# Patient Record
Sex: FEMALE | Race: WHITE | NOT HISPANIC OR LATINO | Employment: OTHER | URBAN - METROPOLITAN AREA
[De-identification: names, ages, dates, MRNs, and addresses within clinical notes are randomized per-mention and may not be internally consistent; named-entity substitution may affect disease eponyms.]

---

## 2018-02-05 ENCOUNTER — OFFICE VISIT CONVERTED (OUTPATIENT)
Dept: CARDIOLOGY | Facility: CLINIC | Age: 73
End: 2018-02-05
Attending: SPECIALIST

## 2018-02-05 ENCOUNTER — CONVERSION ENCOUNTER (OUTPATIENT)
Dept: CARDIOLOGY | Facility: CLINIC | Age: 73
End: 2018-02-05

## 2018-05-07 ENCOUNTER — HOSPITAL ENCOUNTER (OUTPATIENT)
Dept: GENERAL RADIOLOGY | Facility: HOSPITAL | Age: 73
Discharge: HOME | End: 2018-05-07
Admitting: FAMILY MEDICINE

## 2018-05-07 ENCOUNTER — TRANSCRIBE ORDERS (OUTPATIENT)
Dept: ULTRASOUND IMAGING | Facility: HOSPITAL | Age: 73
End: 2018-05-07

## 2018-05-07 DIAGNOSIS — M25.561 RIGHT KNEE PAIN, UNSPECIFIED CHRONICITY: Primary | ICD-10-CM

## 2018-05-07 PROCEDURE — 73562 X-RAY EXAM OF KNEE 3: CPT

## 2018-06-01 ENCOUNTER — HOSPITAL ENCOUNTER (OUTPATIENT)
Dept: GENERAL RADIOLOGY | Facility: HOSPITAL | Age: 73
Discharge: HOME | End: 2018-06-01
Admitting: NURSE PRACTITIONER

## 2018-06-01 ENCOUNTER — HOSPITAL ENCOUNTER (OUTPATIENT)
Dept: GENERAL RADIOLOGY | Facility: HOSPITAL | Age: 73
Discharge: HOME | End: 2018-06-01

## 2018-06-01 ENCOUNTER — TRANSCRIBE ORDERS (OUTPATIENT)
Dept: ULTRASOUND IMAGING | Facility: HOSPITAL | Age: 73
End: 2018-06-01

## 2018-06-01 DIAGNOSIS — M25.572 LEFT ANKLE PAIN, UNSPECIFIED CHRONICITY: Primary | ICD-10-CM

## 2018-06-01 DIAGNOSIS — M79.672 LEFT FOOT PAIN: ICD-10-CM

## 2018-06-01 PROCEDURE — 73610 X-RAY EXAM OF ANKLE: CPT

## 2018-06-01 PROCEDURE — 73630 X-RAY EXAM OF FOOT: CPT

## 2018-11-20 ENCOUNTER — TRANSCRIBE ORDERS (OUTPATIENT)
Dept: ULTRASOUND IMAGING | Facility: HOSPITAL | Age: 73
End: 2018-11-20

## 2018-11-20 ENCOUNTER — HOSPITAL ENCOUNTER (OUTPATIENT)
Dept: GENERAL RADIOLOGY | Facility: HOSPITAL | Age: 73
Discharge: HOME | End: 2018-11-20
Admitting: NURSE PRACTITIONER

## 2018-11-20 DIAGNOSIS — M25.571 RIGHT ANKLE PAIN, UNSPECIFIED CHRONICITY: Primary | ICD-10-CM

## 2018-11-20 PROCEDURE — 73610 X-RAY EXAM OF ANKLE: CPT

## 2019-02-22 DIAGNOSIS — I48.91 NEW ONSET A-FIB (HCC): ICD-10-CM

## 2019-02-22 RX ORDER — ESCITALOPRAM OXALATE 20 MG/1
20 TABLET ORAL DAILY
Status: ON HOLD | COMMUNITY
End: 2021-05-11 | Stop reason: SDUPTHER

## 2019-02-22 RX ORDER — OXYBUTYNIN CHLORIDE 10 MG/1
10 TABLET, EXTENDED RELEASE ORAL DAILY
COMMUNITY
End: 2019-07-03 | Stop reason: DRUGHIGH

## 2019-02-27 ENCOUNTER — OFFICE VISIT (OUTPATIENT)
Dept: CARDIOLOGY | Age: 74
End: 2019-02-27
Payer: MEDICARE

## 2019-02-27 VITALS
WEIGHT: 141 LBS | HEART RATE: 58 BPM | DIASTOLIC BLOOD PRESSURE: 78 MMHG | HEIGHT: 60 IN | SYSTOLIC BLOOD PRESSURE: 132 MMHG | BODY MASS INDEX: 27.68 KG/M2

## 2019-02-27 DIAGNOSIS — I10 ESSENTIAL HYPERTENSION: ICD-10-CM

## 2019-02-27 DIAGNOSIS — R00.2 PALPITATIONS: Primary | ICD-10-CM

## 2019-02-27 PROCEDURE — 4040F PNEUMOC VAC/ADMIN/RCVD: CPT | Performed by: CLINICAL NURSE SPECIALIST

## 2019-02-27 PROCEDURE — 1036F TOBACCO NON-USER: CPT | Performed by: CLINICAL NURSE SPECIALIST

## 2019-02-27 PROCEDURE — 93000 ELECTROCARDIOGRAM COMPLETE: CPT | Performed by: CLINICAL NURSE SPECIALIST

## 2019-02-27 PROCEDURE — G8484 FLU IMMUNIZE NO ADMIN: HCPCS | Performed by: CLINICAL NURSE SPECIALIST

## 2019-02-27 PROCEDURE — 99204 OFFICE O/P NEW MOD 45 MIN: CPT | Performed by: CLINICAL NURSE SPECIALIST

## 2019-02-27 PROCEDURE — 1090F PRES/ABSN URINE INCON ASSESS: CPT | Performed by: CLINICAL NURSE SPECIALIST

## 2019-02-27 PROCEDURE — G8427 DOCREV CUR MEDS BY ELIG CLIN: HCPCS | Performed by: CLINICAL NURSE SPECIALIST

## 2019-02-27 PROCEDURE — 3017F COLORECTAL CA SCREEN DOC REV: CPT | Performed by: CLINICAL NURSE SPECIALIST

## 2019-02-27 PROCEDURE — 1123F ACP DISCUSS/DSCN MKR DOCD: CPT | Performed by: CLINICAL NURSE SPECIALIST

## 2019-02-27 PROCEDURE — G8400 PT W/DXA NO RESULTS DOC: HCPCS | Performed by: CLINICAL NURSE SPECIALIST

## 2019-02-27 PROCEDURE — 0296T PR EXT ECG > 48HR TO 21 DAY RCRD W/CONECT INTL RCRD: CPT | Performed by: CLINICAL NURSE SPECIALIST

## 2019-02-27 PROCEDURE — 1101F PT FALLS ASSESS-DOCD LE1/YR: CPT | Performed by: CLINICAL NURSE SPECIALIST

## 2019-02-27 PROCEDURE — G8419 CALC BMI OUT NRM PARAM NOF/U: HCPCS | Performed by: CLINICAL NURSE SPECIALIST

## 2019-02-27 RX ORDER — LOSARTAN POTASSIUM AND HYDROCHLOROTHIAZIDE 12.5; 5 MG/1; MG/1
1 TABLET ORAL PRN
COMMUNITY
End: 2019-02-27 | Stop reason: ALTCHOICE

## 2019-02-27 RX ORDER — OLMESARTAN MEDOXOMIL AND HYDROCHLOROTHIAZIDE 20/12.5 20; 12.5 MG/1; MG/1
1 TABLET ORAL DAILY
Qty: 30 TABLET | Refills: 5 | Status: SHIPPED | OUTPATIENT
Start: 2019-02-27 | End: 2019-07-03 | Stop reason: DRUGHIGH

## 2019-02-27 ASSESSMENT — ENCOUNTER SYMPTOMS
EYE REDNESS: 0
FACIAL SWELLING: 0
NAUSEA: 0
CHEST TIGHTNESS: 0
COUGH: 0
VOMITING: 0
SHORTNESS OF BREATH: 0
ABDOMINAL PAIN: 0
WHEEZING: 0

## 2019-03-11 ENCOUNTER — TELEPHONE (OUTPATIENT)
Dept: CARDIOLOGY | Age: 74
End: 2019-03-11

## 2019-03-27 ENCOUNTER — OFFICE VISIT (OUTPATIENT)
Dept: CARDIOLOGY | Age: 74
End: 2019-03-27
Payer: MEDICARE

## 2019-03-27 VITALS
DIASTOLIC BLOOD PRESSURE: 80 MMHG | BODY MASS INDEX: 29.25 KG/M2 | HEIGHT: 60 IN | WEIGHT: 149 LBS | HEART RATE: 70 BPM | SYSTOLIC BLOOD PRESSURE: 166 MMHG

## 2019-03-27 DIAGNOSIS — I24.9 ACUTE ISCHEMIC HEART DISEASE (HCC): ICD-10-CM

## 2019-03-27 DIAGNOSIS — I25.9 CHEST PAIN DUE TO MYOCARDIAL ISCHEMIA, UNSPECIFIED ISCHEMIC CHEST PAIN TYPE: Primary | ICD-10-CM

## 2019-03-27 PROCEDURE — 99213 OFFICE O/P EST LOW 20 MIN: CPT | Performed by: INTERNAL MEDICINE

## 2019-03-27 RX ORDER — METOPROLOL SUCCINATE 50 MG/1
50 TABLET, EXTENDED RELEASE ORAL DAILY
Qty: 90 TABLET | Refills: 3 | Status: SHIPPED | OUTPATIENT
Start: 2019-03-27 | End: 2019-07-03 | Stop reason: DRUGHIGH

## 2019-04-16 DIAGNOSIS — R07.9 CHEST PAIN IN ADULT: Primary | ICD-10-CM

## 2019-04-16 NOTE — PROGRESS NOTES
[facility] called and said diagnosis Dr. Patrick Hendrickson placed does not cover for ECHO scheduled tomorrow. He used unspecified chest pain. Changed and put chest pain in adult.

## 2019-04-17 ENCOUNTER — HOSPITAL ENCOUNTER (OUTPATIENT)
Dept: NON INVASIVE DIAGNOSTICS | Age: 74
Discharge: HOME | End: 2019-04-17
Payer: MEDICARE

## 2019-04-17 DIAGNOSIS — R07.9 CHEST PAIN IN ADULT: ICD-10-CM

## 2019-04-17 LAB
LV EF: 67 %
LVEF MODALITY: NORMAL

## 2019-04-17 PROCEDURE — 93306 TTE W/DOPPLER COMPLETE: CPT

## 2019-04-29 ENCOUNTER — HOSPITAL ENCOUNTER (OUTPATIENT)
Dept: NUCLEAR MEDICINE | Age: 74
Discharge: HOME | End: 2019-05-01
Payer: MEDICARE

## 2019-04-29 ENCOUNTER — HOSPITAL ENCOUNTER (OUTPATIENT)
Dept: NON INVASIVE DIAGNOSTICS | Age: 74
Discharge: HOME | End: 2019-04-29
Payer: MEDICARE

## 2019-04-29 DIAGNOSIS — I24.9 ACUTE ISCHEMIC HEART DISEASE (HCC): ICD-10-CM

## 2019-04-29 DIAGNOSIS — I25.9 CHEST PAIN DUE TO MYOCARDIAL ISCHEMIA, UNSPECIFIED ISCHEMIC CHEST PAIN TYPE: ICD-10-CM

## 2019-04-29 PROCEDURE — 3430000000 HC RX DIAGNOSTIC RADIOPHARMACEUTICAL: Performed by: INTERNAL MEDICINE

## 2019-04-29 PROCEDURE — 93017 CV STRESS TEST TRACING ONLY: CPT

## 2019-04-29 PROCEDURE — A9500 TC99M SESTAMIBI: HCPCS | Performed by: INTERNAL MEDICINE

## 2019-04-29 PROCEDURE — 78452 HT MUSCLE IMAGE SPECT MULT: CPT

## 2019-04-29 RX ADMIN — TETRAKIS(2-METHOXYISOBUTYLISOCYANIDE)COPPER(I) TETRAFLUOROBORATE 10 MILLICURIE: 1 INJECTION, POWDER, LYOPHILIZED, FOR SOLUTION INTRAVENOUS at 15:56

## 2019-04-29 RX ADMIN — TETRAKIS(2-METHOXYISOBUTYLISOCYANIDE)COPPER(I) TETRAFLUOROBORATE 30 MILLICURIE: 1 INJECTION, POWDER, LYOPHILIZED, FOR SOLUTION INTRAVENOUS at 15:57

## 2019-04-30 LAB
LV EF: 76 %
LVEF MODALITY: NORMAL

## 2019-05-31 ENCOUNTER — HOSPITAL ENCOUNTER (OUTPATIENT)
Dept: URGENT CARE | Facility: CLINIC | Age: 74
Discharge: HOME | End: 2019-05-31
Attending: NURSE PRACTITIONER

## 2019-05-31 LAB — GLUCOSE BLD-MCNC: 136 MG/DL (ref 65–99)

## 2019-07-03 ENCOUNTER — OFFICE VISIT (OUTPATIENT)
Dept: CARDIOLOGY | Age: 74
End: 2019-07-03
Payer: MEDICARE

## 2019-07-03 VITALS
HEART RATE: 60 BPM | BODY MASS INDEX: 29.25 KG/M2 | HEIGHT: 60 IN | WEIGHT: 149 LBS | DIASTOLIC BLOOD PRESSURE: 76 MMHG | SYSTOLIC BLOOD PRESSURE: 140 MMHG

## 2019-07-03 DIAGNOSIS — I47.1 SVT (SUPRAVENTRICULAR TACHYCARDIA) (HCC): Primary | ICD-10-CM

## 2019-07-03 PROCEDURE — 99213 OFFICE O/P EST LOW 20 MIN: CPT | Performed by: INTERNAL MEDICINE

## 2019-07-03 RX ORDER — OXYBUTYNIN CHLORIDE 10 MG/1
10 TABLET, EXTENDED RELEASE ORAL PRN
Status: ON HOLD | COMMUNITY
End: 2021-05-11 | Stop reason: HOSPADM

## 2019-07-03 RX ORDER — METOPROLOL SUCCINATE 50 MG/1
50 TABLET, EXTENDED RELEASE ORAL DAILY
COMMUNITY
End: 2020-08-12 | Stop reason: SDUPTHER

## 2019-07-03 RX ORDER — OLMESARTAN MEDOXOMIL AND HYDROCHLOROTHIAZIDE 20/12.5 20; 12.5 MG/1; MG/1
1 TABLET ORAL PRN
COMMUNITY
End: 2021-01-12 | Stop reason: SDUPTHER

## 2019-07-03 NOTE — PROGRESS NOTES
Smokeless tobacco: Never Used   Substance and Sexual Activity    Alcohol use: Yes     Comment: Occasional.    Drug use: No    Sexual activity: Not on file   Lifestyle    Physical activity:     Days per week: Not on file     Minutes per session: Not on file    Stress: Not on file   Relationships    Social connections:     Talks on phone: Not on file     Gets together: Not on file     Attends Hinduism service: Not on file     Active member of club or organization: Not on file     Attends meetings of clubs or organizations: Not on file     Relationship status: Not on file    Intimate partner violence:     Fear of current or ex partner: Not on file     Emotionally abused: Not on file     Physically abused: Not on file     Forced sexual activity: Not on file   Other Topics Concern    Not on file   Social History Narrative    Not on file       Family History:       Problem Relation Age of Onset    Hypertension Father     Stroke Father          age 80    Brain Cancer Mother          Physical Examination:  BP (!) 140/76 (Site: Left Upper Arm)   Pulse 60   Ht 5' (1.524 m)   Wt 149 lb (67.6 kg)   BMI 29.10 kg/m²   Physical Exam  Blood pressure right arm sitting 140/80 mmHg, pulse 60 bpm regular  No JVD  No edema  No carotid bruits  S1 and S2 of normal intensity, no significant systolic or diastolic murmurs appreciated  Lungs are clear bilaterally with no crepitations or wheezes  Abdomen is soft, nontender, no palpable organomegaly, no abnormal pulsations are felt, no bruits audible  Neurological status is intact  No deformities    Labs:   CBC: No results for input(s): WBC, HGB, HCT, PLT in the last 72 hours. BMP:No results for input(s): NA, K, CO2, BUN, CREATININE, LABGLOM, GLUCOSE in the last 72 hours. BNP: No results for input(s): BNP in the last 72 hours. PT/INR: No results for input(s): PROTIME, INR in the last 72 hours. APTT:No results for input(s): APTT in the last 72 hours.   CARDIAC ENZYMES:No results for input(s): CKTOTAL, CKMB, CKMBINDEX, TROPONINI in the last 72 hours. FASTING LIPID PANEL:No results found for: HDL, LDLDIRECT, LDLCALC, TRIG  LIVER PROFILE:No results for input(s): AST, ALT, LABALBU in the last 72 hours.         Imaging:    Conclusions      Summary   Normal left ventricular size and function.   Mild concentric left ventricular hypertrophy.   Left ventricular ejection fraction is estimated at 67%.      Signature      ----------------------------------------------------------------   Electronically signed by Vicenta Schroeder MD(Interpreting   physician) on 04/17/2019 08:35 PM   ----------------------------------------------------------------      Findings      Mitral Valve   Mitral valve leaflets are mildly thickened with preserved leaflet   mobility.   Mild mitral regurgitation is present.      Aortic Valve   Mildly thickened aortic valve leaflets with preserved leaflet mobility.   Aortic valve appears to be tricuspid.      Tricuspid Valve   Mild tricuspid regurgitation.      Pulmonic Valve   Trace pulmonic regurgitation present.      Left Atrium   Normal size left atrium.      Left Ventricle   Normal left ventricular size and function.   Mild concentric left ventricular hypertrophy.   Left ventricular ejection fraction is estimated at 67%.      Right Atrium   Normal right atrial dimension with no evidence of thrombus or mass noted.      Right Ventricle   Normal right ventricular size with preserved RV function.      Pericardial Effusion   No evidence of significant pericardial effusion is noted.      Pleural Effusion   No evidence of pleural effusion.     M-Mode Measurements (cm)      LVIDd: 4.57 cm                         LVIDs: 2.86 cm   IVSd: 1.07 cm   LVPWd: 1.06 cm                         AO Root Dimension: 2.8 cm   % Ejection Fraction: 67.6 %            LA: 3.8 cm                                          LVOT: 2 cm     Doppler Measurements:      AV Velocity:2.52 cm/s                MV

## 2019-07-04 ASSESSMENT — ENCOUNTER SYMPTOMS
CONSTIPATION: 0
VOMITING: 0
SHORTNESS OF BREATH: 0
EYE DISCHARGE: 0
COUGH: 0
DIARRHEA: 0
WHEEZING: 0
BLOOD IN STOOL: 0
BACK PAIN: 0
ABDOMINAL DISTENTION: 0

## 2019-08-12 ENCOUNTER — APPOINTMENT (OUTPATIENT)
Dept: CT IMAGING | Age: 74
End: 2019-08-12
Payer: MEDICARE

## 2019-08-12 ENCOUNTER — HOSPITAL ENCOUNTER (EMERGENCY)
Age: 74
Discharge: HOME | End: 2019-08-12
Attending: EMERGENCY MEDICINE
Payer: MEDICARE

## 2019-08-12 ENCOUNTER — APPOINTMENT (OUTPATIENT)
Dept: GENERAL RADIOLOGY | Age: 74
End: 2019-08-12
Payer: MEDICARE

## 2019-08-12 VITALS
WEIGHT: 154 LBS | BODY MASS INDEX: 30.23 KG/M2 | TEMPERATURE: 98.2 F | SYSTOLIC BLOOD PRESSURE: 144 MMHG | RESPIRATION RATE: 17 BRPM | HEIGHT: 60 IN | OXYGEN SATURATION: 93 % | DIASTOLIC BLOOD PRESSURE: 71 MMHG | HEART RATE: 66 BPM
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
## 2019-08-12 DIAGNOSIS — M25.562 ACUTE PAIN OF LEFT KNEE: ICD-10-CM

## 2019-08-12 DIAGNOSIS — S09.90XA INJURY OF HEAD, INITIAL ENCOUNTER: ICD-10-CM

## 2019-08-12 DIAGNOSIS — M25.532 LEFT WRIST PAIN: ICD-10-CM

## 2019-08-12 PROCEDURE — 70450 CT HEAD/BRAIN W/O DYE: CPT

## 2019-08-12 PROCEDURE — 6370000000 HC RX 637 (ALT 250 FOR IP): Performed by: EMERGENCY MEDICINE

## 2019-08-12 PROCEDURE — 99284 EMERGENCY DEPT VISIT MOD MDM: CPT | Performed by: EMERGENCY MEDICINE

## 2019-08-12 PROCEDURE — 99284 EMERGENCY DEPT VISIT MOD MDM: CPT

## 2019-08-12 PROCEDURE — 73110 X-RAY EXAM OF WRIST: CPT

## 2019-08-12 PROCEDURE — 72131 CT LUMBAR SPINE W/O DYE: CPT

## 2019-08-12 PROCEDURE — 73560 X-RAY EXAM OF KNEE 1 OR 2: CPT

## 2019-08-12 RX ORDER — HYDROCODONE BITARTRATE AND ACETAMINOPHEN 7.5; 325 MG/1; MG/1
1 TABLET ORAL EVERY 6 HOURS PRN
Qty: 12 TABLET | Refills: 0 | Status: SHIPPED | OUTPATIENT
Start: 2019-08-12 | End: 2019-08-15

## 2019-08-12 RX ORDER — HYDROCODONE BITARTRATE AND ACETAMINOPHEN 5; 325 MG/1; MG/1
2 TABLET ORAL ONCE
Status: COMPLETED | OUTPATIENT
Start: 2019-08-12 | End: 2019-08-12

## 2019-08-12 RX ADMIN — HYDROCODONE BITARTRATE AND ACETAMINOPHEN 2 TABLET: 5; 325 TABLET ORAL at 18:53

## 2019-08-12 ASSESSMENT — ENCOUNTER SYMPTOMS
NAUSEA: 0
VISUAL CHANGE: 0
VOMITING: 0

## 2019-08-12 ASSESSMENT — PAIN SCALES - GENERAL
PAINLEVEL_OUTOF10: 9
PAINLEVEL_OUTOF10: 4

## 2019-08-12 NOTE — ED PROVIDER NOTES
REPAIR      HYSTERECTOMY      MASTECTOMY, BILATERAL      SINUS SURGERY           CURRENT MEDICATIONS       Discharge Medication List as of 2019  8:00 PM      CONTINUE these medications which have NOT CHANGED    Details   metoprolol succinate (TOPROL XL) 50 MG extended release tablet Take 1/2 tablet dailyHistorical Med      olmesartan-hydrochlorothiazide (BENICAR HCT) 20-12.5 MG per tablet Take 1 tablet by mouth as neededHistorical Med      oxybutynin (DITROPAN-XL) 10 MG extended release tablet Take 10 mg by mouth as neededHistorical Med      escitalopram (LEXAPRO) 20 MG tablet Take 20 mg by mouth dailyHistorical Med             ALLERGIES     Patient has no known allergies. FAMILY HISTORY       Family History   Problem Relation Age of Onset    Hypertension Father     Stroke Father          age 80    Brain Cancer Mother           SOCIAL HISTORY       Social History     Socioeconomic History    Marital status:       Spouse name: Not on file    Number of children: Not on file    Years of education: Not on file    Highest education level: Not on file   Occupational History    Not on file   Social Needs    Financial resource strain: Not on file    Food insecurity:     Worry: Not on file     Inability: Not on file    Transportation needs:     Medical: Not on file     Non-medical: Not on file   Tobacco Use    Smoking status: Former Smoker     Types: Cigarettes     Last attempt to quit:      Years since quittin.6    Smokeless tobacco: Never Used   Substance and Sexual Activity    Alcohol use: Yes     Comment: Occasional.    Drug use: No    Sexual activity: Not on file   Lifestyle    Physical activity:     Days per week: Not on file     Minutes per session: Not on file    Stress: Not on file   Relationships    Social connections:     Talks on phone: Not on file     Gets together: Not on file     Attends Episcopal service: Not on file     Active member of club or organization: such as CT, Ultrasound and MRI are read by the radiologist. Plain radiographic images are visualized and preliminarily interpreted by the emergency physician with the below findings:        Interpretation per the Radiologist below, if available at the time ofthis note:    CT Head WO Contrast   Final Result   1. Mild cerebral and cerebellar volume loss with chronic microvascular   disease but no evidence of acute intracranial process. 2. Right frontal scalp hematoma. There is no associated calvarial   injury. Signed by Dr Christy Brownlee on 8/12/2019 7:29 PM      CT Lumbar Spine WO Contrast   Final Result   Impression:    1. No evidence of displaced fracture. However, on the sagittal   reconstructions there appears to be some lucency associated with the   lower aspect of S1 and S2. There is some cortical thinning along the   posterior margin of the S1 and S2 vertebral body and I would question   whether this could potentially represent metastatic lesions. I would   suggest follow-up with a outpatient MRI for further assessment with   and without contrast. No displaced fractures are identified. 2. Retrolisthesis of L2 on L3 likely representing subluxation at the   level of the facets. There is degenerative disc disease also noted at   this level. Signed by Dr Christy Brownlee on 8/12/2019 7:50 PM      XR WRIST LEFT (MIN 3 VIEWS)   Final Result   . No evidence of fracture. Signed by Dr Christy Brownlee on 8/12/2019 7:26 PM      XR KNEE LEFT (1-2 VIEWS)   Final Result   . Normal 2 view exam of the left knee. Signed by Dr Christy Brownlee on 8/12/2019 7:25 PM            ED BEDSIDE ULTRASOUND:   Performed by ED Physician - none    LABS:  Labs Reviewed - No data to display    All other labs were within normal range or not returned as of this dictation.     EMERGENCY DEPARTMENT COURSE and DIFFERENTIAL DIAGNOSIS/MDM:   Vitals:    Vitals:    08/12/19 1828 08/12/19 1835 08/12/19 2001   BP: (!) 125/58 (!) 160/76 (!) 144/71

## 2019-08-15 ENCOUNTER — APPOINTMENT (OUTPATIENT)
Dept: GENERAL RADIOLOGY | Age: 74
End: 2019-08-15
Payer: MEDICARE

## 2019-08-15 ENCOUNTER — HOSPITAL ENCOUNTER (EMERGENCY)
Age: 74
Discharge: HOME | End: 2019-08-15
Attending: EMERGENCY MEDICINE
Payer: MEDICARE

## 2019-08-15 VITALS
HEIGHT: 60 IN | HEART RATE: 57 BPM | SYSTOLIC BLOOD PRESSURE: 137 MMHG | WEIGHT: 155 LBS | RESPIRATION RATE: 18 BRPM | BODY MASS INDEX: 30.43 KG/M2 | DIASTOLIC BLOOD PRESSURE: 69 MMHG | TEMPERATURE: 97.6 F | OXYGEN SATURATION: 92 %

## 2019-08-15 DIAGNOSIS — R42 DIZZINESS: ICD-10-CM

## 2019-08-15 DIAGNOSIS — M25.561 KNEE PAIN, RIGHT ANTERIOR: Primary | ICD-10-CM

## 2019-08-15 DIAGNOSIS — S20.212A CONTUSION OF LEFT CHEST WALL, INITIAL ENCOUNTER: ICD-10-CM

## 2019-08-15 DIAGNOSIS — S80.01XA CONTUSION OF RIGHT KNEE, INITIAL ENCOUNTER: ICD-10-CM

## 2019-08-15 LAB
ALBUMIN SERPL-MCNC: 3.7 G/DL (ref 3.5–5.2)
ALP BLD-CCNC: 63 U/L (ref 35–104)
ALT SERPL-CCNC: 12 U/L (ref 5–33)
ANION GAP SERPL CALCULATED.3IONS-SCNC: 10 MMOL/L (ref 7–19)
AST SERPL-CCNC: 23 U/L (ref 5–32)
BASOPHILS ABSOLUTE: 0.1 K/UL (ref 0–0.2)
BASOPHILS RELATIVE PERCENT: 1 % (ref 0–1)
BILIRUB SERPL-MCNC: 0.3 MG/DL (ref 0.2–1.2)
BILIRUBIN URINE: NEGATIVE
BLOOD, URINE: NEGATIVE
BUN BLDV-MCNC: 18 MG/DL (ref 8–23)
CALCIUM SERPL-MCNC: 8.7 MG/DL (ref 8.8–10.2)
CHLORIDE BLD-SCNC: 106 MMOL/L (ref 98–111)
CLARITY: CLEAR
CO2: 24 MMOL/L (ref 22–29)
COLOR: YELLOW
CREAT SERPL-MCNC: 1 MG/DL (ref 0.5–0.9)
EOSINOPHILS ABSOLUTE: 0.3 K/UL (ref 0–0.6)
EOSINOPHILS RELATIVE PERCENT: 3.8 % (ref 0–5)
GFR NON-AFRICAN AMERICAN: 54
GLUCOSE BLD-MCNC: 102 MG/DL (ref 74–109)
GLUCOSE URINE: NEGATIVE MG/DL
HCT VFR BLD CALC: 39.9 % (ref 37–47)
HEMOGLOBIN: 12.5 G/DL (ref 12–16)
KETONES, URINE: ABNORMAL MG/DL
LEUKOCYTE ESTERASE, URINE: NEGATIVE
LYMPHOCYTES ABSOLUTE: 1.2 K/UL (ref 1.1–4.5)
LYMPHOCYTES RELATIVE PERCENT: 17.9 % (ref 20–40)
MCH RBC QN AUTO: 28.3 PG (ref 27–31)
MCHC RBC AUTO-ENTMCNC: 31.3 G/DL (ref 33–37)
MCV RBC AUTO: 90.5 FL (ref 81–99)
MONOCYTES ABSOLUTE: 0.6 K/UL (ref 0–0.9)
MONOCYTES RELATIVE PERCENT: 9 % (ref 0–10)
NEUTROPHILS ABSOLUTE: 4.7 K/UL (ref 1.5–7.5)
NEUTROPHILS RELATIVE PERCENT: 67.9 % (ref 50–65)
NITRITE, URINE: NEGATIVE
PDW BLD-RTO: 14.9 % (ref 11.5–14.5)
PH UA: 7 (ref 5–8)
PLATELET # BLD: 218 K/UL (ref 130–400)
PMV BLD AUTO: 10.3 FL (ref 9.4–12.3)
POTASSIUM SERPL-SCNC: 4.5 MMOL/L (ref 3.5–5)
PROTEIN UA: NEGATIVE MG/DL
RBC # BLD: 4.41 M/UL (ref 4.2–5.4)
SODIUM BLD-SCNC: 140 MMOL/L (ref 136–145)
SPECIFIC GRAVITY UA: 1.02 (ref 1–1.03)
TOTAL PROTEIN: 6.9 G/DL (ref 6.6–8.7)
URINE REFLEX TO CULTURE: ABNORMAL
UROBILINOGEN, URINE: 1 E.U./DL
WBC # BLD: 6.9 K/UL (ref 4.8–10.8)

## 2019-08-15 PROCEDURE — 73560 X-RAY EXAM OF KNEE 1 OR 2: CPT

## 2019-08-15 PROCEDURE — 6370000000 HC RX 637 (ALT 250 FOR IP): Performed by: EMERGENCY MEDICINE

## 2019-08-15 PROCEDURE — 36415 COLL VENOUS BLD VENIPUNCTURE: CPT

## 2019-08-15 PROCEDURE — 71046 X-RAY EXAM CHEST 2 VIEWS: CPT

## 2019-08-15 PROCEDURE — 99285 EMERGENCY DEPT VISIT HI MDM: CPT

## 2019-08-15 PROCEDURE — 85025 COMPLETE CBC W/AUTO DIFF WBC: CPT

## 2019-08-15 PROCEDURE — 80053 COMPREHEN METABOLIC PANEL: CPT

## 2019-08-15 PROCEDURE — 93005 ELECTROCARDIOGRAM TRACING: CPT

## 2019-08-15 PROCEDURE — 81003 URINALYSIS AUTO W/O SCOPE: CPT

## 2019-08-15 RX ORDER — MECLIZINE HYDROCHLORIDE 25 MG/1
25 TABLET ORAL 3 TIMES DAILY PRN
Qty: 30 TABLET | Refills: 0 | Status: SHIPPED | OUTPATIENT
Start: 2019-08-15 | End: 2019-08-25

## 2019-08-15 RX ORDER — MECLIZINE HCL 12.5 MG/1
25 TABLET ORAL ONCE
Status: COMPLETED | OUTPATIENT
Start: 2019-08-15 | End: 2019-08-15

## 2019-08-15 RX ADMIN — MECLIZINE 25 MG: 12.5 TABLET ORAL at 21:42

## 2019-08-15 ASSESSMENT — ENCOUNTER SYMPTOMS
COUGH: 0
BACK PAIN: 0
RHINORRHEA: 0
CONSTIPATION: 0
SORE THROAT: 0
CHEST TIGHTNESS: 0
ABDOMINAL DISTENTION: 0
ABDOMINAL PAIN: 0
COLOR CHANGE: 0
TROUBLE SWALLOWING: 0
PHOTOPHOBIA: 0
SHORTNESS OF BREATH: 0
DIARRHEA: 0
VOMITING: 0
WHEEZING: 0
NAUSEA: 0
EYE PAIN: 0

## 2019-08-15 ASSESSMENT — PAIN DESCRIPTION - LOCATION: LOCATION: HEAD

## 2019-08-15 ASSESSMENT — PAIN SCALES - GENERAL: PAINLEVEL_OUTOF10: 5

## 2019-08-16 LAB
EKG P AXIS: 67 DEGREES
EKG P-R INTERVAL: 168 MS
EKG Q-T INTERVAL: 478 MS
EKG QRS DURATION: 78 MS
EKG QTC CALCULATION (BAZETT): 468 MS
EKG T AXIS: 32 DEGREES

## 2019-08-16 NOTE — ED PROVIDER NOTES
Years since quittin.6    Smokeless tobacco: Never Used   Substance and Sexual Activity    Alcohol use: Yes     Comment: Occasional.    Drug use: No    Sexual activity: None   Lifestyle    Physical activity:     Days per week: None     Minutes per session: None    Stress: None   Relationships    Social connections:     Talks on phone: None     Gets together: None     Attends Oriental orthodox service: None     Active member of club or organization: None     Attends meetings of clubs or organizations: None     Relationship status: None    Intimate partner violence:     Fear of current or ex partner: None     Emotionally abused: None     Physically abused: None     Forced sexual activity: None   Other Topics Concern    None   Social History Narrative    None       SCREENINGS    [place] Coma Scale  Eye Opening: Spontaneous  Best Verbal Response: Oriented  Best Motor Response: Obeys commands  [place] Coma Scale Score: 15        PHYSICAL EXAM    (up to 7 for level 4, 8 or more for level 5)     ED Triage Vitals [08/15/19 1712]   BP Temp Temp src Pulse Resp SpO2 Height Weight   130/74 97.6 °F (36.4 °C) -- 54 19 96 % 5' (1.524 m) 155 lb (70.3 kg)       Physical Exam   Constitutional: She is oriented to person, place, and time. She appears well-developed and well-nourished. No distress. HENT:   Head: Normocephalic and atraumatic. Mouth/Throat: Oropharynx is clear and moist.   Eyes: Pupils are equal, round, and reactive to light. Conjunctivae and EOM are normal.   Neck: Normal range of motion. Neck supple. No JVD present. Cardiovascular: Normal rate, regular rhythm and normal heart sounds. No murmur heard. Pulmonary/Chest: Effort normal and breath sounds normal. She has no wheezes. She has no rales. She exhibits tenderness. Abdominal: Soft. Bowel sounds are normal. She exhibits no distension. There is no tenderness. There is no rebound and no guarding. Musculoskeletal: She exhibits tenderness. appears clinically well. Patient reexamined prior to discharge and appears improved. Patient has been encouraged to follow up with his/her PCP/Ortho and to return to the ED with any lack of improvement or worsening symptoms. The patient';s questions regarding the work up and plan were invited and answered. The patient/guardian states understanding and agreement with the plan. Patient Progress  Patient progress: stable      Reassessment      CONSULTS:  None    PROCEDURES:  Unless otherwise noted below, none     Procedures    FINAL IMPRESSION      1. Knee pain, right anterior    2. Dizziness    3. Contusion of left chest wall, initial encounter    4.  Contusion of right knee, initial encounter          DISPOSITION/PLAN   DISPOSITION        PATIENT REFERRED TO:  Loida Mcgovern, [address] [facility]  [phone]    Call   For follow up, As needed      DISCHARGE MEDICATIONS:  New Prescriptions    MECLIZINE (ANTIVERT) 25 MG TABLET    Take 1 tablet by mouth 3 times daily as needed for Dizziness          (Please note that portions of this note were completed with a voice recognition program.  Efforts were made to edit thedictations but occasionally words are mis-transcribed.)    Smith Dixon MD (electronically signed)  Attending Emergency Physician          Tanja Blake MD  08/15/19 5030

## 2019-08-27 ENCOUNTER — HOSPITAL ENCOUNTER (OUTPATIENT)
Dept: WOMENS IMAGING | Age: 74
Discharge: HOME | End: 2019-08-27
Payer: MEDICARE

## 2019-08-27 DIAGNOSIS — Z78.0 ASYMPTOMATIC AGE-RELATED POSTMENOPAUSAL STATE: ICD-10-CM

## 2019-08-27 DIAGNOSIS — Z12.31 ENCOUNTER FOR SCREENING MAMMOGRAM FOR MALIGNANT NEOPLASM OF BREAST: ICD-10-CM

## 2019-08-27 PROCEDURE — 77080 DXA BONE DENSITY AXIAL: CPT

## 2019-08-27 PROCEDURE — 77063 BREAST TOMOSYNTHESIS BI: CPT

## 2019-09-03 ENCOUNTER — HOSPITAL ENCOUNTER (OUTPATIENT)
Dept: GENERAL RADIOLOGY | Age: 74
Discharge: HOME | End: 2019-09-03
Payer: MEDICARE

## 2019-09-03 DIAGNOSIS — M25.532 LEFT WRIST PAIN: ICD-10-CM

## 2019-09-03 PROCEDURE — 73110 X-RAY EXAM OF WRIST: CPT

## 2019-09-19 ENCOUNTER — HOSPITAL ENCOUNTER (OUTPATIENT)
Dept: MRI IMAGING | Age: 74
Discharge: HOME | End: 2019-09-19
Payer: MEDICARE

## 2019-09-19 DIAGNOSIS — M54.5 LOW BACK PAIN, UNSPECIFIED BACK PAIN LATERALITY, UNSPECIFIED CHRONICITY, WITH SCIATICA PRESENCE UNSPECIFIED: ICD-10-CM

## 2019-09-19 LAB
GFR NON-AFRICAN AMERICAN: >60
PERFORMED ON: NORMAL
POC CREATININE: 0.9 MG/DL (ref 0.3–1.3)
POC SAMPLE TYPE: NORMAL

## 2019-09-19 PROCEDURE — 82565 ASSAY OF CREATININE: CPT

## 2019-09-19 PROCEDURE — 6360000004 HC RX CONTRAST MEDICATION: Performed by: FAMILY MEDICINE

## 2019-09-19 PROCEDURE — A9577 INJ MULTIHANCE: HCPCS | Performed by: FAMILY MEDICINE

## 2019-09-19 PROCEDURE — 72158 MRI LUMBAR SPINE W/O & W/DYE: CPT

## 2019-09-19 RX ADMIN — GADOBENATE DIMEGLUMINE 14 ML: 529 INJECTION, SOLUTION INTRAVENOUS at 13:47

## 2019-09-21 ENCOUNTER — APPOINTMENT (OUTPATIENT)
Dept: GENERAL RADIOLOGY | Age: 74
End: 2019-09-21
Payer: MEDICARE

## 2019-09-21 ENCOUNTER — HOSPITAL ENCOUNTER (EMERGENCY)
Age: 74
Discharge: HOME | End: 2019-09-21
Attending: EMERGENCY MEDICINE
Payer: MEDICARE

## 2019-09-21 VITALS
DIASTOLIC BLOOD PRESSURE: 55 MMHG | SYSTOLIC BLOOD PRESSURE: 123 MMHG | WEIGHT: 153 LBS | TEMPERATURE: 98.1 F | HEIGHT: 60 IN | BODY MASS INDEX: 30.04 KG/M2 | OXYGEN SATURATION: 100 % | HEART RATE: 74 BPM | RESPIRATION RATE: 20 BRPM

## 2019-09-21 DIAGNOSIS — J40 BRONCHITIS: Primary | ICD-10-CM

## 2019-09-21 DIAGNOSIS — M79.602 LEFT ARM PAIN: ICD-10-CM

## 2019-09-21 PROCEDURE — 71046 X-RAY EXAM CHEST 2 VIEWS: CPT

## 2019-09-21 PROCEDURE — 99283 EMERGENCY DEPT VISIT LOW MDM: CPT

## 2019-09-21 RX ORDER — AZITHROMYCIN 250 MG/1
TABLET, FILM COATED ORAL
Qty: 6 TABLET | Refills: 0 | Status: SHIPPED | OUTPATIENT
Start: 2019-09-21 | End: 2019-10-14 | Stop reason: ALTCHOICE

## 2019-09-21 RX ORDER — HYDROCODONE BITARTRATE AND ACETAMINOPHEN 5; 325 MG/1; MG/1
1 TABLET ORAL EVERY 6 HOURS PRN
Qty: 10 TABLET | Refills: 0 | Status: SHIPPED | OUTPATIENT
Start: 2019-09-21 | End: 2019-09-28

## 2019-09-21 ASSESSMENT — PAIN SCALES - GENERAL: PAINLEVEL_OUTOF10: 1

## 2019-09-21 NOTE — ED PROVIDER NOTES
[facility]  eMERGENCY dEPARTMENT eNCOUnter      Pt Name: Lilia Jiménez  MRN: [id number]  [place] 1945  Date of evaluation: 9/21/2019  Provider: Rayna Anderson MD    [address] [place] [address]       Chief Complaint   Patient presents with    Cough     Pt to ED with c/o cough with sore throat and dizziness          HISTORY OF PRESENT ILLNESS   (Location/Symptom, Timing/Onset,Context/Setting, Quality, Duration, Modifying Factors, Severity)  Note limiting factors. Lilia Jiménez is a 76 y.o. female who presents to the emergency department for evaluation of cough, congestion and sore throat. Patient reports the symptoms been ongoing for the past couple of days, has been of moderate severity. She describes the cough is nonproductive in nature. She describes the pain in her throat is sharp, without radiation, seems slightly worse when she swallows. States that she has felt a little bit dizzy however denies any overt syncope. Denies any chest pain or palpitations. States that she has not had any recent sick contacts, recent travel or any recent antibiotic therapy. HPI    NursingNotes were reviewed. REVIEW OF SYSTEMS    (2-9 systems for level 4, 10 or more for level 5)     Review of Systems   Constitutional: Negative for chills and fever. HENT: Positive for sore throat. Negative for voice change. Respiratory: Positive for cough. Negative for wheezing and stridor. Cardiovascular: Negative for chest pain and palpitations. Gastrointestinal: Negative for abdominal distention, abdominal pain, nausea and vomiting. Neurological: Negative for dizziness and syncope.             PAST MEDICALHISTORY     Past Medical History:   Diagnosis Date    Carpal tunnel syndrome     x2    Hyperlipidemia     Neurofibromatosis ([facility] [address]          SURGICAL HISTORY       Past Surgical History:   Procedure Laterality Date    APPENDECTOMY      CARPAL TUNNEL RELEASE      HERNIA REPAIR      HYSTERECTOMY      data to display    All other labs were within normal range or not returned as of this dictation. EMERGENCY DEPARTMENT COURSE and DIFFERENTIAL DIAGNOSIS/MDM:   Vitals:    Vitals:    09/21/19 1257 09/21/19 1402 09/21/19 1432 09/21/19 1455   BP: (!) 147/67 124/62 (!) 123/55    Pulse: 74      Resp: 20      Temp: 98.1 °F (36.7 °C)      SpO2: 93% 97% 98% 100%   Weight: 153 lb (69.4 kg)      Height: 5' (1.524 m)          MDM    Reassessment    Patient is in no respiratory distress. Chest x-ray questionable for atelectasis versus early changes of pneumonia. We will plan to cover her with some oral antibiotics and have her follow-up with her PMD.    PROCEDURES:  Unless otherwise noted below, none     Procedures    FINAL IMPRESSION      1. Bronchitis    2. Left arm pain          DISPOSITION/PLAN   DISPOSITION Decision To Discharge 09/21/2019 02:58:57 PM      PATIENT REFERRED TO:  Ivelisse Harvey, APRN - CNP  [address] [place] [address]  [phone]            DISCHARGE MEDICATIONS:  Discharge Medication List as of 9/21/2019  3:03 PM      START taking these medications    Details   HYDROcodone-acetaminophen (NORCO) 5-325 MG per tablet Take 1 tablet by mouth every 6 hours as needed for Pain for up to 7 days. , Disp-10 tablet, R-0Print      azithromycin (ZITHROMAX) 250 MG tablet Take 2 tablets by mouth on day 1, then 1 tablet by mouth ×4 days. , Disp-6 tablet, R-0Print                (Please note that portions of this note were completed with a voice recognition program.  Efforts were made to edit thedictations but occasionally words are mis-transcribed.)    Jenifer Dee MD (electronically signed)  Attending Emergency Physician         Jenifer Dee MD  10/01/19 2630

## 2019-10-01 ENCOUNTER — TELEPHONE (OUTPATIENT)
Dept: ADMINISTRATIVE | Age: 74
End: 2019-10-01

## 2019-10-01 ASSESSMENT — ENCOUNTER SYMPTOMS
VOICE CHANGE: 0
NAUSEA: 0
SORE THROAT: 1
ABDOMINAL DISTENTION: 0
VOMITING: 0
COUGH: 1
STRIDOR: 0
ABDOMINAL PAIN: 0
WHEEZING: 0

## 2019-10-14 ENCOUNTER — OFFICE VISIT (OUTPATIENT)
Dept: GASTROENTEROLOGY | Age: 74
End: 2019-10-14
Payer: MEDICARE

## 2019-10-14 VITALS
DIASTOLIC BLOOD PRESSURE: 70 MMHG | BODY MASS INDEX: 29.84 KG/M2 | HEART RATE: 58 BPM | HEIGHT: 60 IN | SYSTOLIC BLOOD PRESSURE: 138 MMHG | OXYGEN SATURATION: 97 % | WEIGHT: 152 LBS

## 2019-10-14 DIAGNOSIS — K21.9 GASTROESOPHAGEAL REFLUX DISEASE, ESOPHAGITIS PRESENCE NOT SPECIFIED: ICD-10-CM

## 2019-10-14 DIAGNOSIS — R19.4 CHANGE IN BOWEL HABITS: ICD-10-CM

## 2019-10-14 DIAGNOSIS — R19.7 DIARRHEA, UNSPECIFIED TYPE: ICD-10-CM

## 2019-10-14 DIAGNOSIS — R19.5 POSITIVE COLORECTAL CANCER SCREENING USING COLOGUARD TEST: Primary | ICD-10-CM

## 2019-10-14 DIAGNOSIS — R11.0 NAUSEA: ICD-10-CM

## 2019-10-14 PROCEDURE — 99204 OFFICE O/P NEW MOD 45 MIN: CPT | Performed by: NURSE PRACTITIONER

## 2019-10-14 RX ORDER — ACETAMINOPHEN 160 MG
2000 TABLET,DISINTEGRATING ORAL
COMMUNITY

## 2019-10-14 RX ORDER — OMEPRAZOLE 20 MG/1
20 CAPSULE, DELAYED RELEASE ORAL PRN
Status: ON HOLD | COMMUNITY
End: 2020-02-18 | Stop reason: ALTCHOICE

## 2019-10-14 RX ORDER — ONDANSETRON 4 MG/1
4 TABLET, ORALLY DISINTEGRATING ORAL EVERY 8 HOURS PRN
Qty: 5 TABLET | Refills: 0 | Status: SHIPPED | OUTPATIENT
Start: 2019-10-14 | End: 2021-08-09

## 2019-10-14 ASSESSMENT — ENCOUNTER SYMPTOMS
BLOOD IN STOOL: 0
RECTAL PAIN: 0
ABDOMINAL DISTENTION: 0
DIARRHEA: 1
CONSTIPATION: 0
VOMITING: 0
ANAL BLEEDING: 0
COUGH: 0
CHOKING: 0
TROUBLE SWALLOWING: 0
NAUSEA: 1
SHORTNESS OF BREATH: 0
ABDOMINAL PAIN: 0

## 2019-11-06 ENCOUNTER — TELEPHONE (OUTPATIENT)
Dept: GASTROENTEROLOGY | Age: 74
End: 2019-11-06

## 2019-11-06 PROCEDURE — 88305 TISSUE EXAM BY PATHOLOGIST: CPT | Performed by: INTERNAL MEDICINE

## 2019-11-06 PROCEDURE — 88342 IMHCHEM/IMCYTCHM 1ST ANTB: CPT | Performed by: INTERNAL MEDICINE

## 2019-11-08 ENCOUNTER — LAB REQUISITION (OUTPATIENT)
Dept: LAB | Facility: HOSPITAL | Age: 74
End: 2019-11-08

## 2019-11-08 DIAGNOSIS — Z00.00 ROUTINE GENERAL MEDICAL EXAMINATION AT A HEALTH CARE FACILITY: ICD-10-CM

## 2019-11-11 LAB
CYTO UR: NORMAL
LAB AP CASE REPORT: NORMAL
LAB AP CLINICAL INFORMATION: NORMAL
PATH REPORT.FINAL DX SPEC: NORMAL
PATH REPORT.GROSS SPEC: NORMAL

## 2019-12-18 ENCOUNTER — OFFICE VISIT (OUTPATIENT)
Dept: GASTROENTEROLOGY | Age: 74
End: 2019-12-18
Payer: MEDICARE

## 2019-12-18 VITALS
DIASTOLIC BLOOD PRESSURE: 80 MMHG | SYSTOLIC BLOOD PRESSURE: 160 MMHG | WEIGHT: 155 LBS | HEART RATE: 55 BPM | BODY MASS INDEX: 30.43 KG/M2 | HEIGHT: 60 IN | OXYGEN SATURATION: 93 %

## 2019-12-18 DIAGNOSIS — D12.3 ADENOMATOUS POLYP OF TRANSVERSE COLON: ICD-10-CM

## 2019-12-18 DIAGNOSIS — K25.3 ACUTE GASTRIC ULCER WITHOUT HEMORRHAGE OR PERFORATION: Primary | ICD-10-CM

## 2019-12-18 PROCEDURE — 1123F ACP DISCUSS/DSCN MKR DOCD: CPT | Performed by: NURSE PRACTITIONER

## 2019-12-18 PROCEDURE — G8484 FLU IMMUNIZE NO ADMIN: HCPCS | Performed by: NURSE PRACTITIONER

## 2019-12-18 PROCEDURE — 3017F COLORECTAL CA SCREEN DOC REV: CPT | Performed by: NURSE PRACTITIONER

## 2019-12-18 PROCEDURE — 1036F TOBACCO NON-USER: CPT | Performed by: NURSE PRACTITIONER

## 2019-12-18 PROCEDURE — 99213 OFFICE O/P EST LOW 20 MIN: CPT | Performed by: NURSE PRACTITIONER

## 2019-12-18 PROCEDURE — G8427 DOCREV CUR MEDS BY ELIG CLIN: HCPCS | Performed by: NURSE PRACTITIONER

## 2019-12-18 PROCEDURE — G8599 NO ASA/ANTIPLAT THER USE RNG: HCPCS | Performed by: NURSE PRACTITIONER

## 2019-12-18 PROCEDURE — 1090F PRES/ABSN URINE INCON ASSESS: CPT | Performed by: NURSE PRACTITIONER

## 2019-12-18 PROCEDURE — G8399 PT W/DXA RESULTS DOCUMENT: HCPCS | Performed by: NURSE PRACTITIONER

## 2019-12-18 PROCEDURE — 4040F PNEUMOC VAC/ADMIN/RCVD: CPT | Performed by: NURSE PRACTITIONER

## 2019-12-18 PROCEDURE — G8417 CALC BMI ABV UP PARAM F/U: HCPCS | Performed by: NURSE PRACTITIONER

## 2019-12-18 RX ORDER — PANTOPRAZOLE SODIUM 40 MG/1
20 TABLET, DELAYED RELEASE ORAL DAILY
Refills: 4 | Status: ON HOLD | COMMUNITY
Start: 2019-11-08 | End: 2021-05-11 | Stop reason: HOSPADM

## 2019-12-18 ASSESSMENT — ENCOUNTER SYMPTOMS
TROUBLE SWALLOWING: 0
NAUSEA: 1
DIARRHEA: 0
ABDOMINAL DISTENTION: 0
SHORTNESS OF BREATH: 0
BLOOD IN STOOL: 0
CHOKING: 0
ABDOMINAL PAIN: 0
RECTAL PAIN: 0
CONSTIPATION: 0
COUGH: 0
VOMITING: 0
ANAL BLEEDING: 0

## 2020-01-15 ENCOUNTER — TELEPHONE (OUTPATIENT)
Dept: GASTROENTEROLOGY | Age: 75
End: 2020-01-15

## 2020-01-15 NOTE — TELEPHONE ENCOUNTER
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 Egd/Cln done at [facility] per  11-6-19. No Fu scheduled. Patient called today from [phone] said since she had her Egd doen and placed on pantoprazole 40 mg Bid ( written Rx post procedure) she has had diarrhea sometimes 4-6 times a day, she stopped the medication for a few days to make sure this was the cause and the diarrhea went away, she restarted and the diarrhea has returned, Patient is asking for something else to take, she has history of Gastric Antral Ulcer and has a repeat Egd scheduled on 2-5-20. Patient has tried OTC Omeprazole in past but that got expensive to purchase and is asking what she can do or take in place of Pantoprazole, the diarrhea is getting worse. I will forward to [facility] aprn for review. The patient is asking me to call her back.  David morton

## 2020-01-17 ENCOUNTER — TELEPHONE (OUTPATIENT)
Dept: CARDIOLOGY | Age: 75
End: 2020-01-17

## 2020-01-27 ENCOUNTER — OFFICE VISIT (OUTPATIENT)
Dept: CARDIOLOGY | Age: 75
End: 2020-01-27
Payer: MEDICARE

## 2020-01-27 VITALS
BODY MASS INDEX: 31.8 KG/M2 | OXYGEN SATURATION: 97 % | HEART RATE: 55 BPM | SYSTOLIC BLOOD PRESSURE: 136 MMHG | DIASTOLIC BLOOD PRESSURE: 72 MMHG | WEIGHT: 162 LBS | HEIGHT: 60 IN

## 2020-01-27 PROCEDURE — 4040F PNEUMOC VAC/ADMIN/RCVD: CPT | Performed by: NURSE PRACTITIONER

## 2020-01-27 PROCEDURE — G8399 PT W/DXA RESULTS DOCUMENT: HCPCS | Performed by: NURSE PRACTITIONER

## 2020-01-27 PROCEDURE — G8427 DOCREV CUR MEDS BY ELIG CLIN: HCPCS | Performed by: NURSE PRACTITIONER

## 2020-01-27 PROCEDURE — G8417 CALC BMI ABV UP PARAM F/U: HCPCS | Performed by: NURSE PRACTITIONER

## 2020-01-27 PROCEDURE — 93000 ELECTROCARDIOGRAM COMPLETE: CPT | Performed by: NURSE PRACTITIONER

## 2020-01-27 PROCEDURE — 1123F ACP DISCUSS/DSCN MKR DOCD: CPT | Performed by: NURSE PRACTITIONER

## 2020-01-27 PROCEDURE — 1090F PRES/ABSN URINE INCON ASSESS: CPT | Performed by: NURSE PRACTITIONER

## 2020-01-27 PROCEDURE — 3017F COLORECTAL CA SCREEN DOC REV: CPT | Performed by: NURSE PRACTITIONER

## 2020-01-27 PROCEDURE — G8484 FLU IMMUNIZE NO ADMIN: HCPCS | Performed by: NURSE PRACTITIONER

## 2020-01-27 PROCEDURE — 99213 OFFICE O/P EST LOW 20 MIN: CPT | Performed by: NURSE PRACTITIONER

## 2020-01-27 PROCEDURE — 1036F TOBACCO NON-USER: CPT | Performed by: NURSE PRACTITIONER

## 2020-01-27 NOTE — PROGRESS NOTES
[facility] Cardiology   Established Patient Office Visit   [facility]. [address] [facility]  [phone]        OFFICE VISIT:  2020    Gila Marcio - : 1945    Reason For Visit:  Debbie Tian is a 76 y.o. female who is here for 6 Month Follow-Up and Palpitations    1. Essential hypertension    2. PAF (paroxysmal atrial fibrillation) ([facility] [address]        Patient with a history of paroxysmal atrial fib and hypertension. She is a patient of Dr. Ary Fisher. She presents to clinic today for 6-month follow-up. Patient denies any complaints of chest pain, pressure or tightness. There is no shortness of breath, orthopnea or PND. Patient denies any lightheadedness, dizziness or syncope. DATA:    2019 Lexiscan   1. Grossly negative Cardiolyte for the presence of ischemia or   infarction with normal wall motion and ejection fraction at rest.   2. Poor effort tolerance     2019 2D echo  Normal left ventricular size and function. Mild concentric left ventricular hypertrophy. Left ventricular ejection fraction is estimated at 67%. Mild mitral regurg. Normal size left atrium.          Zhane Simmons is a 76 y.o. female with the following history as recorded in [facility]:    Patient Active Problem List    Diagnosis Date Noted    Essential hypertension 2019    New onset a-fib ([facility] 2019     Current Outpatient Medications   Medication Sig Dispense Refill    pantoprazole (PROTONIX) 40 MG tablet TK ONE T PO BID  4    omeprazole (PRILOSEC) 20 MG delayed release capsule Take 20 mg by mouth as needed      Cholecalciferol (VITAMIN D3) 2000 units CAPS Take 2,000 Units by mouth      ondansetron (ZOFRAN ODT) 4 MG disintegrating tablet Take 1 tablet by mouth every 8 hours as needed for Nausea Take one prior to beginning colon prep 5 tablet 0    metoprolol succinate (TOPROL XL) 50 MG extended release tablet Take 50 mg by mouth daily       olmesartan-hydrochlorothiazide (BENICAR HCT) 20-12.5 MG per tablet Take 1 tablet by mouth as needed      oxybutynin (DITROPAN-XL) 10 MG extended release tablet Take 10 mg by mouth as needed      escitalopram (LEXAPRO) 20 MG tablet Take 20 mg by mouth daily       No current facility-administered medications for this visit. Allergies: Patient has no known allergies.   Past Medical History:   Diagnosis Date    Carpal tunnel syndrome     x2    Hyperlipidemia     Neurofibromatosis ([address]      Past Surgical History:   Procedure Laterality Date    APPENDECTOMY      CARPAL TUNNEL RELEASE      COLONOSCOPY  2019    Dr Mary Leslie [facility]-Retained stool, TVA, (-) dysplasia x 1, HP x 1, 3 yr recall    HERNIA REPAIR      HYSTERECTOMY      MASTECTOMY, BILATERAL      SINUS SURGERY      UPPER GASTROINTESTINAL ENDOSCOPY  2019    Dr Mary Leslie [facility]-Duodenitis, gastric antral ulcer, gastritis, repeat in 3 months     Family History   Problem Relation Age of Onset    Hypertension Father     Stroke Father          age 80    Brain Cancer Mother     Colon Cancer Neg Hx     Colon Polyps Neg Hx      Social History     Tobacco Use    Smoking status: Former Smoker     Types: Cigarettes     Last attempt to quit: 1988     Years since quittin.0    Smokeless tobacco: Never Used   Substance Use Topics    Alcohol use: Yes     Comment: Occasional.          Review of Systems:    General:      Complaint / Symptom Yes / No / Description if Yes       Fatigue No   Weight gain N/A   Insomnia N/A       Respiratory:        Complaint / Symptom Yes / No / Description if Yes       Cough No   Horseness N/A       Cardiovascular:    Complaint / Symptom Yes / No / Description if Yes       Chest Pain No   Shortness of Air / Orthopnea No   Presyncope / Syncope No   Palpitations No         Objective:    /72   Pulse 55   Ht 5' (1.524 m)   Wt 162 lb (73.5 kg)   SpO2 97%   BMI 31.64 kg/m²     GENERAL - well developed and well nourished, conversant  HEENT -  PERRLA, Hearing appears normal, gentleman lids are normal.  External inspection of ears and nose appear normal.  NECK - no thyromegaly, no JVD, trachea is in the midline  CARDIOVASCULAR - PMI is in the mid line clavicular position, Normal S1 and S2 with no systolic murmur. No S3 or S4    PULMONARY - no respiratory distress. No wheezes or rales. Lungs are clear to ausculation, normal respiratory effort. ABDOMEN  - soft, non tender, no rebound  MUSCULOSKELETAL  - range of motion of the upper and lower extermites appears normal and equal and is without pain   EXTREMITIES - no significant edema   NEUROLOGIC - gait and station are normal  SKIN - turgor is normal, can warm and dry. PSYCHIATRIC - normal mood and affect, alert and orientated x 3,      ASSESSMENT:    ALL THE CARDIOLOGY PROBLEMS ARE LISTED ABOVE; HOWEVER, THE FOLLOWING SPECIFIC CARDIAC PROBLEMS / CONDITIONS WERE ADDRESSED AND TREATED DURING THE OFFICE VISIT TODAY:                                                                                            MEDICAL DECISION MAKING             Cardiac Specific Problem / Diagnosis  Discussion and Data Reviewed Diagnostic Procedures Ordered Management Options Selected           1. Hypertension  Stable   Review and summation of old records:    Blood pressure in the office today 136/72. O2 sat 97%. No Continue current medications:     Yes           2. Paroxysmal atrial fib  Stable   No recurrence. Patient is on Toprol-XL 50 mg daily. No Continue current medications: Yes                                     Orders Placed This Encounter   Procedures    EKG 12 lead     Order Specific Question:   Reason for Exam?     Answer: Other     No orders of the defined types were placed in this encounter. Discussed with patient. Return in about 6 months (around 7/27/2020) for Dr Zoran Saunders .     I greatly appreciate the opportunity to meet Gely Cm and your confidence in allowing me to participate in her cardiovascular care. Kevin Spears, NEO - NP  1/27/2020 3:20 PM                    This dictation was generated by voice recognition computer software. Although all attempts are made to edit dictation for accuracy, there may be errors in the transcription that are not intended.

## 2020-02-18 ENCOUNTER — ANESTHESIA (OUTPATIENT)
Dept: ENDOSCOPY | Age: 75
End: 2020-02-18
Payer: MEDICARE

## 2020-02-18 ENCOUNTER — ANESTHESIA EVENT (OUTPATIENT)
Dept: ENDOSCOPY | Age: 75
End: 2020-02-18
Payer: MEDICARE

## 2020-02-18 ENCOUNTER — HOSPITAL ENCOUNTER (OUTPATIENT)
Age: 75
Setting detail: OUTPATIENT SURGERY
Discharge: HOME | End: 2020-02-18
Attending: INTERNAL MEDICINE | Admitting: INTERNAL MEDICINE
Payer: MEDICARE

## 2020-02-18 VITALS
TEMPERATURE: 97.8 F | RESPIRATION RATE: 18 BRPM | SYSTOLIC BLOOD PRESSURE: 134 MMHG | HEART RATE: 47 BPM | HEIGHT: 60 IN | BODY MASS INDEX: 30.04 KG/M2 | WEIGHT: 153 LBS | DIASTOLIC BLOOD PRESSURE: 54 MMHG | OXYGEN SATURATION: 100 %

## 2020-02-18 VITALS
RESPIRATION RATE: 22 BRPM | SYSTOLIC BLOOD PRESSURE: 150 MMHG | DIASTOLIC BLOOD PRESSURE: 53 MMHG | OXYGEN SATURATION: 93 %

## 2020-02-18 PROCEDURE — 2580000003 HC RX 258: Performed by: INTERNAL MEDICINE

## 2020-02-18 PROCEDURE — 7100000010 HC PHASE II RECOVERY - FIRST 15 MIN: Performed by: INTERNAL MEDICINE

## 2020-02-18 PROCEDURE — 7100000011 HC PHASE II RECOVERY - ADDTL 15 MIN: Performed by: INTERNAL MEDICINE

## 2020-02-18 PROCEDURE — 6360000002 HC RX W HCPCS: Performed by: NURSE ANESTHETIST, CERTIFIED REGISTERED

## 2020-02-18 PROCEDURE — 3700000001 HC ADD 15 MINUTES (ANESTHESIA): Performed by: INTERNAL MEDICINE

## 2020-02-18 PROCEDURE — 88342 IMHCHEM/IMCYTCHM 1ST ANTB: CPT

## 2020-02-18 PROCEDURE — 43239 EGD BIOPSY SINGLE/MULTIPLE: CPT | Performed by: INTERNAL MEDICINE

## 2020-02-18 PROCEDURE — 2500000003 HC RX 250 WO HCPCS: Performed by: NURSE ANESTHETIST, CERTIFIED REGISTERED

## 2020-02-18 PROCEDURE — 3700000000 HC ANESTHESIA ATTENDED CARE: Performed by: INTERNAL MEDICINE

## 2020-02-18 PROCEDURE — 3609017100 HC EGD: Performed by: INTERNAL MEDICINE

## 2020-02-18 PROCEDURE — 88305 TISSUE EXAM BY PATHOLOGIST: CPT

## 2020-02-18 RX ORDER — PROPOFOL 10 MG/ML
INJECTION, EMULSION INTRAVENOUS PRN
Status: DISCONTINUED | OUTPATIENT
Start: 2020-02-18 | End: 2020-02-18 | Stop reason: SDUPTHER

## 2020-02-18 RX ORDER — LIDOCAINE HYDROCHLORIDE 20 MG/ML
INJECTION, SOLUTION INFILTRATION; PERINEURAL PRN
Status: DISCONTINUED | OUTPATIENT
Start: 2020-02-18 | End: 2020-02-18 | Stop reason: SDUPTHER

## 2020-02-18 RX ORDER — SODIUM CHLORIDE, SODIUM LACTATE, POTASSIUM CHLORIDE, CALCIUM CHLORIDE 600; 310; 30; 20 MG/100ML; MG/100ML; MG/100ML; MG/100ML
INJECTION, SOLUTION INTRAVENOUS CONTINUOUS
Status: DISCONTINUED | OUTPATIENT
Start: 2020-02-18 | End: 2020-02-18 | Stop reason: HOSPADM

## 2020-02-18 RX ADMIN — SODIUM CHLORIDE, POTASSIUM CHLORIDE, SODIUM LACTATE AND CALCIUM CHLORIDE: 600; 310; 30; 20 INJECTION, SOLUTION INTRAVENOUS at 12:02

## 2020-02-18 RX ADMIN — LIDOCAINE HYDROCHLORIDE 40 MG: 20 INJECTION, SOLUTION INFILTRATION; PERINEURAL at 12:29

## 2020-02-18 RX ADMIN — PROPOFOL 150 MG: 10 INJECTION, EMULSION INTRAVENOUS at 12:29

## 2020-02-18 ASSESSMENT — PAIN - FUNCTIONAL ASSESSMENT: PAIN_FUNCTIONAL_ASSESSMENT: 0-10

## 2020-02-18 NOTE — OP NOTE
Endoscopic Procedure Note    Patient: Denny Lick: [birth year]  [facility] Rec#: [id number] Acc#: [id number]     Primary Care Provider NEO Arthur - CNP  Referring Provider: Alfonso LARSON    Endoscopist: Alaina Michelle MD    Date of Procedure:  2/18/2020    Procedure:   1. EGD with biopsy    Indications:   1. Antral ulceration on previous EGD    Anesthesia:  Sedation was administered by anesthesia who monitored the patient during the procedure. Estimated Blood Loss: minimal    Procedure:   After reviewing the patient's chart and obtaining informed consent, the patient was placed in the left lateral decubitus position. A forward-viewing Olympus endoscope was lubricated and inserted through the mouth into the oropharynx. Under direct visualization, the upper esophagus was intubated. The scope was advanced to the level of the third portion of duodenum. Scope was slowly withdrawn with careful inspection of the mucosal surfaces. The scope was retroflexed for inspection of the gastric fundus and incisura. Findings and maneuvers are listed in impression below. The patient tolerated the procedure well. The scope was removed. There were no immediate complications. Findings:   Esophagus: abnormal: in the distal esophagus there were mucosal changes of esophagitis vs Jarvis's - biopsied for histology. There is no hiatal hernia present. Stomach:  abnormal: mild mucosal changes suggestive of gastritis noted -  Gastric biopsies were taken from the antrum and body to rule out Helicobacter pylori infection. The previously seen ulceration was healed completely      Duodenum: normal      IMPRESSION:  1. Well healed gastric ulceration   2. Esophagitis vs Jarvis's      RECOMMENDATIONS:    1. Await path results, the patient will be contacted in 7-10 days with biopsy results. 2.  PPI twice daily x 3 more months then taper off slowly as symptoms allow. 3.  NSAID avoidance.      The results were discussed with the patient and family. A copy of the images obtained were given to the patient.      Dragan Sanchez MD  2/18/2020  12:38 PM

## 2020-02-18 NOTE — ANESTHESIA PRE PROCEDURE
Component Value Date     08/15/2019    K 4.5 08/15/2019     08/15/2019    CO2 24 08/15/2019    BUN 18 08/15/2019    CREATININE 0.9 09/19/2019    CREATININE 1.0 08/15/2019    LABGLOM >60 09/19/2019    GLUCOSE 102 08/15/2019    PROT 6.9 08/15/2019    CALCIUM 8.7 08/15/2019    BILITOT 0.3 08/15/2019    ALKPHOS 63 08/15/2019    AST 23 08/15/2019    ALT 12 08/15/2019       POC Tests: No results for input(s): POCGLU, POCNA, POCK, POCCL, POCBUN, POCHEMO, POCHCT in the last 72 hours. Coags: No results found for: PROTIME, INR, APTT    HCG (If Applicable): No results found for: PREGTESTUR, PREGSERUM, HCG, HCGQUANT     ABGs: No results found for: PHART, PO2ART, KSF4JHJ, POL0QJS, BEART, P2VXLYQS     Type & Screen (If Applicable):  No results found for: [facility]    Anesthesia Evaluation  Patient summary reviewed and Nursing notes reviewed no history of anesthetic complications:   Airway: Mallampati: II  TM distance: >3 FB   Neck ROM: full  Mouth opening: < 3 FB Dental: normal exam         Pulmonary:normal exam                              ROS comment: Quit smoking 1988   Cardiovascular:    (+) hypertension:, dysrhythmias: atrial fibrillation, VILLALOBOS:,          Beta Blocker:  Dose within 24 Hrs      ROS comment: 4/29/ 2019 Lexiscan   1. Grossly negative Cardiolyte for the presence of ischemia or  infarction with normal wall motion and ejection fraction at rest.  2. Poor effort tolerance         Neuro/Psych:   (+) depression/anxiety             GI/Hepatic/Renal:   (+) GERD:,          ROS comment: etoh use. Endo/Other:                      ROS comment: H/o polio as a child Abdominal:           Vascular:                                    Anesthesia Plan      general and TIVA     ASA 3       Induction: intravenous. Anesthetic plan and risks discussed with patient.                     Juan Trejo, NEO - CRNA   2/18/2020

## 2020-02-18 NOTE — H&P
Patient Name: Simon Hartmann  :   MRN: [id number]  DATE: 20    Allergies: No Known Allergies     ENDOSCOPY  History and Physical    Procedure:    [] Diagnostic Colonoscopy       [] Screening Colonoscopy  [x] EGD      [] ERCP      [] EUS       [] Other    [x] Previous office notes/History and Physical reviewed from the patients chart. Please see EMR for further details of HPI. I have examined the patient's status immediately prior to the procedure and:      Indications/HPI:    []Abdominal Pain   []Cancer- GI/Lung     []Fhx of colon CA/polyps  []History of Polyps  []Barretts            []Melena  []Abnormal Imaging              []Dysphagia              []Persistent Pneumonia   []Anemia                            []Food Impaction        []History of Polyps  [] GI Bleed             []Pulmonary nodule/Mass   []Change in bowel habits []Heartburn/Reflux  []Rectal Bleed (BRBPR)  []Chest Pain - Non Cardiac []Heme (+) Stool [x]Ulcers  []Constipation  []Hemoptysis  []Varices  []Diarrhea  []Hypoxemia    []Nausea/Vomiting   []Screening   []Crohns/Colitis  []Other:     Anesthesia:   [x] MAC [] Moderate Sedation   [] General   [] None     ROS: 12 pt Review of Symptoms was negative unless mentioned above    Medications:   Prior to Admission medications    Medication Sig Start Date End Date Taking?  Authorizing Provider   pantoprazole (PROTONIX) 40 MG tablet TK ONE T PO BID 19  Yes Historical Provider, MD   metoprolol succinate (TOPROL XL) 50 MG extended release tablet Take 50 mg by mouth daily    Yes Historical Provider, MD   escitalopram (LEXAPRO) 20 MG tablet Take 20 mg by mouth daily   Yes Historical Provider, MD   Cholecalciferol (VITAMIN D3) 2000 units CAPS Take 2,000 Units by mouth    Historical Provider, MD   ondansetron (ZOFRAN ODT) 4 MG disintegrating tablet Take 1 tablet by mouth every 8 hours as needed for Nausea Take one prior to beginning colon prep 10/14/19   Lee Potter APRN - NP olmesartan-hydrochlorothiazide (BENICAR HCT) 20-12.5 MG per tablet Take 1 tablet by mouth as needed    Historical Provider, MD   oxybutynin (DITROPAN-XL) 10 MG extended release tablet Take 10 mg by mouth as needed    Historical Provider, MD       Past Medical History:  Past Medical History:   Diagnosis Date    Arthritis     Carpal tunnel syndrome     x2 right hand    Depression     History of blood clots     behind left knee    History of vein stripping     left leg    Hyperlipidemia     Hypertension     Neurofibromatosis ([address]     Polio     as a child       Past Surgical History:  Past Surgical History:   Procedure Laterality Date    ANKLE SURGERY Left     3 surgeries    APPENDECTOMY      BLADDER SURGERY      x 2    CARPAL TUNNEL RELEASE Right     COLONOSCOPY  2019    Dr Gianluca Watkins Co-Retained stool, TVA, (-) dysplasia x 1, HP x 1, 3 yr recall    HERNIA REPAIR      HYSTERECTOMY      MASTECTOMY, BILATERAL Bilateral     2010 had implants redone    SINUS SURGERY      UPPER GASTROINTESTINAL ENDOSCOPY  2019    Dr Gianluca Watkins Co-Duodenitis, gastric antral ulcer, gastritis, repeat in 3 months       Social History:  Social History     Tobacco Use    Smoking status: Former Smoker     Packs/day: 1.00     Years: 24.00     Pack years: 24.00     Types: Cigarettes     Last attempt to quit:      Years since quittin.1    Smokeless tobacco: Never Used   Substance Use Topics    Alcohol use: Yes     Comment: Occasional.    Drug use: No       Vital Signs:   Vitals:    20 1131   BP: (!) 135/50   Pulse: 53   Resp: 20   Temp: 97.8 °F (36.6 °C)   SpO2: 99%        Physical Exam:  Cardiac:  [x]WNL  []Comments:  Pulmonary:  [x]WNL   []Comments:  Neuro/Mental Status:  [x]WNL  []Comments:  Abdominal:  [x]WNL    []Comments:  Other:   []WNL  []Comments:    Informed Consent:  The risks and benefits of the procedure have been discussed with either the patient or if they cannot consent,

## 2020-02-28 ENCOUNTER — HOSPITAL ENCOUNTER (OUTPATIENT)
Dept: NEUROLOGY | Age: 75
Discharge: HOME | End: 2020-02-28
Payer: MEDICARE

## 2020-02-28 PROCEDURE — 95886 MUSC TEST DONE W/N TEST COMP: CPT | Performed by: PSYCHIATRY & NEUROLOGY

## 2020-02-28 PROCEDURE — 95886 MUSC TEST DONE W/N TEST COMP: CPT

## 2020-02-28 PROCEDURE — 95909 NRV CNDJ TST 5-6 STUDIES: CPT | Performed by: PSYCHIATRY & NEUROLOGY

## 2020-02-28 PROCEDURE — 95909 NRV CNDJ TST 5-6 STUDIES: CPT

## 2020-07-08 ENCOUNTER — TELEPHONE (OUTPATIENT)
Dept: CARDIOLOGY | Age: 75
End: 2020-07-08

## 2020-07-08 NOTE — TELEPHONE ENCOUNTER
Patient called with c/o dizziness and chest tightness. States her BP and HR are good. Advised ED eval for cardiac s/s. She voiced understanding.

## 2020-08-12 ENCOUNTER — OFFICE VISIT (OUTPATIENT)
Dept: CARDIOLOGY | Age: 75
End: 2020-08-12
Payer: MEDICARE

## 2020-08-12 VITALS
SYSTOLIC BLOOD PRESSURE: 128 MMHG | HEART RATE: 68 BPM | DIASTOLIC BLOOD PRESSURE: 72 MMHG | BODY MASS INDEX: 32.39 KG/M2 | HEIGHT: 60 IN | WEIGHT: 165 LBS

## 2020-08-12 PROCEDURE — 99213 OFFICE O/P EST LOW 20 MIN: CPT | Performed by: INTERNAL MEDICINE

## 2020-08-12 PROCEDURE — G8417 CALC BMI ABV UP PARAM F/U: HCPCS | Performed by: INTERNAL MEDICINE

## 2020-08-12 PROCEDURE — 1036F TOBACCO NON-USER: CPT | Performed by: INTERNAL MEDICINE

## 2020-08-12 PROCEDURE — 1090F PRES/ABSN URINE INCON ASSESS: CPT | Performed by: INTERNAL MEDICINE

## 2020-08-12 PROCEDURE — G8427 DOCREV CUR MEDS BY ELIG CLIN: HCPCS | Performed by: INTERNAL MEDICINE

## 2020-08-12 PROCEDURE — G8399 PT W/DXA RESULTS DOCUMENT: HCPCS | Performed by: INTERNAL MEDICINE

## 2020-08-12 PROCEDURE — 3017F COLORECTAL CA SCREEN DOC REV: CPT | Performed by: INTERNAL MEDICINE

## 2020-08-12 PROCEDURE — 4040F PNEUMOC VAC/ADMIN/RCVD: CPT | Performed by: INTERNAL MEDICINE

## 2020-08-12 PROCEDURE — 1123F ACP DISCUSS/DSCN MKR DOCD: CPT | Performed by: INTERNAL MEDICINE

## 2020-08-12 RX ORDER — METOPROLOL SUCCINATE 50 MG/1
50 TABLET, EXTENDED RELEASE ORAL DAILY
Qty: 90 TABLET | Refills: 3 | Status: SHIPPED | OUTPATIENT
Start: 2020-08-12 | End: 2021-01-12 | Stop reason: SDUPTHER

## 2020-08-12 ASSESSMENT — ENCOUNTER SYMPTOMS
BACK PAIN: 0
BLOOD IN STOOL: 0
WHEEZING: 0
DIARRHEA: 0
VOMITING: 0
CONSTIPATION: 0
ABDOMINAL DISTENTION: 0
COUGH: 0
SHORTNESS OF BREATH: 0
EYE DISCHARGE: 0

## 2020-08-12 NOTE — PROGRESS NOTES
[facility]  Cardiology Office Note  [address]  [id number]  Phone: [phone]  Fax: [phone]                            Date:  8/12/2020  Patient: Eliza Vegas  Age:  76 y.o., 1945    Referral: No ref. provider found      PROBLEM LIST:    Patient Active Problem List    Diagnosis Date Noted    Essential hypertension 02/27/2019     Priority: Low    New onset a-fib ([address] 02/22/2019     Priority: Low     1. Hypertension, normal LV ejection fraction echo 4/17/2019, negative stress nuclear study 4/29/2019 with poor effort tolerance. 2. Palpitations with frequent PACs and short bursts of SVT (9 beats or less). 3. Chest pain with palpitations    PRESENTATION: Eliza Vegas is a 76y.o. year old female presents for follow-up evaluation. For the most part she has been doing well with no complaints. Occasionally when she checks her heart rate on her pulse ox it is noted to be in the 40s of 50s. This is usually at rest when she is sitting quietly. She is asymptomatic otherwise. This likely reflects bigeminal beats that are not reflected on her pulse oximeter. REVIEW OF SYSTEMS:  Review of Systems   Constitutional: Negative for activity change, fatigue and fever. HENT: Negative for ear pain, hearing loss and tinnitus. Eyes: Negative for discharge and visual disturbance. Respiratory: Negative for cough, shortness of breath and wheezing. Cardiovascular: Negative for chest pain, palpitations and leg swelling. Gastrointestinal: Negative for abdominal distention, blood in stool, constipation, diarrhea and vomiting. Endocrine: Negative for cold intolerance, heat intolerance, polydipsia and polyuria. Genitourinary: Negative for dysuria and hematuria. Musculoskeletal: Negative for arthralgias, back pain and myalgias. Skin: Negative for pallor and rash. Neurological: Negative for seizures, syncope, weakness and headaches. Psychiatric/Behavioral: Negative for behavioral problems and dysphoric mood. Past Medical History:      Diagnosis Date    Arthritis     Atrial fibrillation [address] [address]     Carpal tunnel syndrome     x2 right hand    Depression     History of blood clots     behind left knee    History of vein stripping     left leg    Hyperlipidemia     Hypertension     Neurofibromatosis [address] [address]     Polio     as a child    Tachycardia        Past Surgical History:      Procedure Laterality Date    ANKLE SURGERY Left     3 surgeries    APPENDECTOMY      BLADDER SURGERY      x 2    CARPAL TUNNEL RELEASE Right     COLONOSCOPY  11/06/2019    Dr Radha Puckett Co-Retained stool, TVA, (-) dysplasia x 1, HP x 1, 3 yr recall    HERNIA REPAIR      HYSTERECTOMY      MASTECTOMY, BILATERAL Bilateral     2010 had implants redone    SINUS SURGERY      UPPER GASTROINTESTINAL ENDOSCOPY  11/06/2019    Dr Radha Puckett Co-Duodenitis, gastric antral ulcer, gastritis, repeat in 3 months    UPPER GASTROINTESTINAL ENDOSCOPY N/A 2/18/2020    Dr Rita Dean Alcantar-Esophagitis, gastritis, well healed gastric ulceration        Medications:  Current Outpatient Medications   Medication Sig Dispense Refill    metoprolol succinate (TOPROL XL) 50 MG extended release tablet Take 1 tablet by mouth daily 90 tablet 3    pantoprazole (PROTONIX) 40 MG tablet TK ONE T PO BID  4    Cholecalciferol (VITAMIN D3) 2000 units CAPS Take 2,000 Units by mouth      ondansetron (ZOFRAN ODT) 4 MG disintegrating tablet Take 1 tablet by mouth every 8 hours as needed for Nausea Take one prior to beginning colon prep 5 tablet 0    olmesartan-hydrochlorothiazide (BENICAR HCT) 20-12.5 MG per tablet Take 1 tablet by mouth as needed      oxybutynin (DITROPAN-XL) 10 MG extended release tablet Take 10 mg by mouth as needed      escitalopram (LEXAPRO) 20 MG tablet Take 20 mg by mouth daily       No current facility-administered medications for this visit. Allergies:  Patient has no known allergies. Past Social History:  Social History     Socioeconomic History    Marital status:       Spouse name: Not on file    Number of children: Not on file    Years of education: Not on file    Highest education level: Not on file   Occupational History    Not on file   Social Needs    Financial resource strain: Not on file    Food insecurity     Worry: Not on file     Inability: Not on file    Transportation needs     Medical: Not on file     Non-medical: Not on file   Tobacco Use    Smoking status: Former Smoker     Packs/day: 1.00     Years: 24.00     Pack years: 24.00     Types: Cigarettes     Last attempt to quit:      Years since quittin.6    Smokeless tobacco: Never Used   Substance and Sexual Activity    Alcohol use: Yes     Comment: Occasional.    Drug use: No    Sexual activity: Not on file   Lifestyle    Physical activity     Days per week: Not on file     Minutes per session: Not on file    Stress: Not on file   Relationships    Social connections     Talks on phone: Not on file     Gets together: Not on file     Attends Zoroastrianism service: Not on file     Active member of club or organization: Not on file     Attends meetings of clubs or organizations: Not on file     Relationship status: Not on file    Intimate partner violence     Fear of current or ex partner: Not on file     Emotionally abused: Not on file     Physically abused: Not on file     Forced sexual activity: Not on file   Other Topics Concern    Not on file   Social History Narrative    Not on file       Family History:       Problem Relation Age of Onset    Hypertension Father     Stroke Father          age 80    Brain Cancer Mother     Colon Cancer Neg Hx     Colon Polyps Neg Hx          Physical Examination:  /72   Pulse 68   Ht 5' (1.524 m)   Wt 165 lb (74.8 kg)   BMI 32.22 kg/m²   Physical Exam  Constitutional:       General: She is not in last 72 hours. APTT:No results for input(s): APTT in the last 72 hours. CARDIAC ENZYMES:No results for input(s): CKTOTAL, CKMB, CKMBINDEX, TROPONINI in the last 72 hours. FASTING LIPID PANEL:No results found for: HDL, LDLDIRECT, LDLCALC, TRIG  LIVER PROFILE:No results for input(s): AST, ALT, LABALBU in the last 72 hours. Imaging:          ASSESSMENT and PLAN:    This is a 67 y. o. year old female with past medical history of hypertension, palpitations consistent with ectopic beats, short bursts of SVT less than 9 beats on a 2 week monitor, normal LV ejection fraction, negative stress nuclear study April 2019 with poor effort tolerance. 1.  For the most part she is doing well with no significant change in good functionality. The heart rate on the lower side likely reflects bigeminal ectopic beats that are not registering on her pulse oximeter. She is asymptomatic otherwise. She will continue metoprolol unchanged. 2.  She will follow-up with me in 6 months. Orders:  No orders of the defined types were placed in this encounter. Orders Placed This Encounter   Medications    metoprolol succinate (TOPROL XL) 50 MG extended release tablet     Sig: Take 1 tablet by mouth daily     Dispense:  90 tablet     Refill:  3             Return in about 6 months (around 2/12/2021). Electronically signed by Kwame Maza MD on 8/12/2020 at 2:25 PM    [facility]      Thisdictation was generated by voice recognition computer software. Although all attempts are made to edit the dictation for accuracy, there may be errors in the transcription that are not intended.

## 2021-01-12 ENCOUNTER — OFFICE VISIT (OUTPATIENT)
Dept: CARDIOLOGY CLINIC | Age: 76
End: 2021-01-12
Payer: MEDICARE

## 2021-01-12 VITALS
HEIGHT: 60 IN | OXYGEN SATURATION: 94 % | HEART RATE: 60 BPM | BODY MASS INDEX: 32.39 KG/M2 | SYSTOLIC BLOOD PRESSURE: 145 MMHG | DIASTOLIC BLOOD PRESSURE: 72 MMHG | WEIGHT: 165 LBS

## 2021-01-12 DIAGNOSIS — R00.2 PALPITATIONS: ICD-10-CM

## 2021-01-12 DIAGNOSIS — I47.1 PAROXYSMAL SVT (SUPRAVENTRICULAR TACHYCARDIA) (HCC): Primary | ICD-10-CM

## 2021-01-12 DIAGNOSIS — I10 ESSENTIAL HYPERTENSION: ICD-10-CM

## 2021-01-12 PROCEDURE — G8399 PT W/DXA RESULTS DOCUMENT: HCPCS | Performed by: NURSE PRACTITIONER

## 2021-01-12 PROCEDURE — G8417 CALC BMI ABV UP PARAM F/U: HCPCS | Performed by: NURSE PRACTITIONER

## 2021-01-12 PROCEDURE — G8427 DOCREV CUR MEDS BY ELIG CLIN: HCPCS | Performed by: NURSE PRACTITIONER

## 2021-01-12 PROCEDURE — 1090F PRES/ABSN URINE INCON ASSESS: CPT | Performed by: NURSE PRACTITIONER

## 2021-01-12 PROCEDURE — 99214 OFFICE O/P EST MOD 30 MIN: CPT | Performed by: NURSE PRACTITIONER

## 2021-01-12 PROCEDURE — 1036F TOBACCO NON-USER: CPT | Performed by: NURSE PRACTITIONER

## 2021-01-12 PROCEDURE — G8484 FLU IMMUNIZE NO ADMIN: HCPCS | Performed by: NURSE PRACTITIONER

## 2021-01-12 PROCEDURE — 93000 ELECTROCARDIOGRAM COMPLETE: CPT | Performed by: NURSE PRACTITIONER

## 2021-01-12 PROCEDURE — 1123F ACP DISCUSS/DSCN MKR DOCD: CPT | Performed by: NURSE PRACTITIONER

## 2021-01-12 PROCEDURE — 4040F PNEUMOC VAC/ADMIN/RCVD: CPT | Performed by: NURSE PRACTITIONER

## 2021-01-12 PROCEDURE — 3017F COLORECTAL CA SCREEN DOC REV: CPT | Performed by: NURSE PRACTITIONER

## 2021-01-12 RX ORDER — METOPROLOL SUCCINATE 50 MG/1
50 TABLET, EXTENDED RELEASE ORAL DAILY
Qty: 90 TABLET | Refills: 3 | Status: ON HOLD | OUTPATIENT
Start: 2021-01-12 | End: 2021-05-11 | Stop reason: HOSPADM

## 2021-01-12 RX ORDER — NICOTINE POLACRILEX 4 MG/1
20 GUM, CHEWING ORAL DAILY
COMMUNITY

## 2021-01-12 RX ORDER — OLMESARTAN MEDOXOMIL AND HYDROCHLOROTHIAZIDE 20/12.5 20; 12.5 MG/1; MG/1
1 TABLET ORAL PRN
Qty: 30 TABLET | Refills: 3 | Status: SHIPPED | OUTPATIENT
Start: 2021-01-12 | End: 2021-03-17

## 2021-01-12 ASSESSMENT — ENCOUNTER SYMPTOMS
SORE THROAT: 0
WHEEZING: 0
COUGH: 0
EYES NEGATIVE: 1
SHORTNESS OF BREATH: 0
GASTROINTESTINAL NEGATIVE: 1
CHEST TIGHTNESS: 0

## 2021-01-12 NOTE — PROGRESS NOTES
[address] [facility]   Established Patient Office Visit   [address] [facility]  [phone]        OFFICE VISIT:  2021    Christopher Wynn - : 1945    Reason For Visit:  Alcario Fabry is a 76 y.o. female who is here for New Patient and Hypertension (Cwyrybu201/85 this morning )    1. Paroxysmal SVT (supraventricular tachycardia) ([facility]    2. Essential hypertension    3. Palpitations        Patient with a history of hypertension, palpitations, paroxysmal SVT. She is a patient of Dr. Feliberto Abrams. Patient presents to clinic today with complaints of blood pressures fluctuating. Patient states she has been out of her blood pressure meds for 2 to 3 weeks now. Normally she takes Toprol-XL 50 mg daily with good control of her blood pressure. She does have as needed Benicar HCT but she is run out of that as well. Patient denies any chest pain, pressure or tightness. There is no shortness of breath, orthopnea or PND. Patient denies any lightheadedness, dizziness or syncope. DATA:    2019 Lexiscan   1. Grossly negative Cardiolyte for the presence of ischemia or   infarction with normal wall motion and ejection fraction at rest.   2. Poor effort tolerance      2019 2D echo  Normal left ventricular size and function.   Mild concentric left ventricular hypertrophy.   Left ventricular ejection fraction is estimated at 67%. Mild mitral regurg.   Normal size left atrium.                Subjective    Christopher Wynn is a 76 y.o. female with the following history as recorded in [facility]:    Patient Active Problem List    Diagnosis Date Noted    Essential hypertension 2019     Priority: Low    New onset a-fib ([facility] [address] 2019     Priority: Low     Current Outpatient Medications   Medication Sig Dispense Refill    metoprolol succinate (TOPROL XL) 50 MG extended release tablet Take 1 tablet by mouth daily 90 tablet 3  omeprazole 20 MG EC tablet Take 20 mg by mouth daily      olmesartan-hydroCHLOROthiazide (BENICAR HCT) 20-12.5 MG per tablet Take 1 tablet by mouth as needed (blood pressure >431 systolic) 30 tablet 3    Cholecalciferol (VITAMIN D3) 2000 units CAPS Take 2,000 Units by mouth      ondansetron (ZOFRAN ODT) 4 MG disintegrating tablet Take 1 tablet by mouth every 8 hours as needed for Nausea Take one prior to beginning colon prep 5 tablet 0    oxybutynin (DITROPAN-XL) 10 MG extended release tablet Take 10 mg by mouth as needed      escitalopram (LEXAPRO) 20 MG tablet Take 20 mg by mouth daily      pantoprazole (PROTONIX) 40 MG tablet TK ONE T PO BID  4     No current facility-administered medications for this visit. Allergies: Patient has no known allergies.   Past Medical History:   Diagnosis Date    Arthritis     Atrial fibrillation ([address]     Carpal tunnel syndrome     x2 right hand    Depression     History of blood clots     behind left knee    History of vein stripping     left leg    Hyperlipidemia     Hypertension     Neurofibromatosis ([address]     Polio     as a child    Tachycardia      Past Surgical History:   Procedure Laterality Date    ANKLE SURGERY Left     3 surgeries    APPENDECTOMY      BLADDER SURGERY      x 2    CARPAL TUNNEL RELEASE Right     COLONOSCOPY  2019    Dr Omar Rogers Co-Retained stool, TVA, (-) dysplasia x 1, HP x 1, 3 yr recall    HERNIA REPAIR      HYSTERECTOMY      MASTECTOMY, BILATERAL Bilateral     2010 had implants redone    SINUS SURGERY      UPPER GASTROINTESTINAL ENDOSCOPY  2019    Dr Omar Rogers Co-Duodenitis, gastric antral ulcer, gastritis, repeat in 3 months    UPPER GASTROINTESTINAL ENDOSCOPY N/A 2020    Dr Ghada Alcantar-Esophagitis, gastritis, well healed gastric ulceration      Family History   Problem Relation Age of Onset    Hypertension Father     Stroke Father          age 80    Brain Cancer Mother  Colon Cancer Neg Hx     Colon Polyps Neg Hx      Social History     Tobacco Use    Smoking status: Former Smoker     Packs/day: 1.00     Years: 24.00     Pack years: 24.00     Types: Cigarettes     Quit date:      Years since quittin.0    Smokeless tobacco: Never Used   Substance Use Topics    Alcohol use: Yes     Comment: Occasional.          Review of Systems:    Review of Systems   Constitutional: Negative for activity change, chills, diaphoresis, fatigue and fever. HENT: Negative for congestion and sore throat. Eyes: Negative. Respiratory: Negative for cough, chest tightness, shortness of breath and wheezing. Cardiovascular: Negative for chest pain, palpitations and leg swelling. Gastrointestinal: Negative. Genitourinary: Negative. Musculoskeletal: Negative. Neurological: Negative for dizziness, syncope, light-headedness and headaches. Psychiatric/Behavioral: Negative for confusion. The patient is not nervous/anxious. Objective:    BP (!) 145/72   Pulse 60   Ht 5' (1.524 m)   Wt 165 lb (74.8 kg)   SpO2 94%   BMI 32.22 kg/m²     GENERAL - well developed and well nourished, conversant  HEENT   PERRLA, Hearing appears normal, gentleman lids are normal.  External inspection of ears and nose appear normal.  NECK - no thyromegaly, no JVD, trachea is in the midline  CARDIOVASCULAR  PMI is in the mid line clavicular position, Normal S1 and S2 with no systolic murmur. No S3 or S4    PULMONARY  no respiratory distress. No wheezes or rales. Lungs are clear to ausculation, normal respiratory effort. ABDOMEN   soft, non tender, no rebound  MUSCULOSKELETAL   range of motion of the upper and lower extermites appears normal and equal and is without pain   EXTREMITIES - no significant edema   NEUROLOGIC  gait and station are normal  SKIN - turgor is normal, can warm and dry.   PSYCHIATRIC - normal mood and affect, alert and orientated x 3,      ASSESSMENT: ALL THE CARDIOLOGY PROBLEMS ARE LISTED ABOVE; HOWEVER, THE FOLLOWING SPECIFIC CARDIAC PROBLEMS / CONDITIONS WERE ADDRESSED AND TREATED DURING THE OFFICE VISIT TODAY:                                                                                            MEDICAL DECISION MAKING             Cardiac Specific Problem / Diagnosis  Discussion and Data Reviewed Diagnostic Procedures Ordered Management Options Selected           1. Hypertension  Worsening   Review and summation of old records:    EKG in the office showing sinus rhythm with a heart rate of 60 bpm.  T wave abnormality. This is unchanged from previous EKG. Blood pressure in the office today 160/80 rechecked 145/72. Will restart Toprol-XL 50 mg daily. As well as placing an order for Benicar HCT as needed. Patient did have well controlled blood pressures on these medications. Unfortunately she has been without for about 2 to 3 weeks. Have patient return to clinic in 3 to 4 weeks for blood pressure check. Yes Continue current medications:     Yes           2. Paroxysmal SVT  Stable   No further recurrence. No Continue current medications: Yes                                     Orders Placed This Encounter   Procedures    EKG 12 lead     Order Specific Question:   Reason for Exam?     Answer:   Irregular heart rate     Orders Placed This Encounter   Medications    metoprolol succinate (TOPROL XL) 50 MG extended release tablet     Sig: Take 1 tablet by mouth daily     Dispense:  90 tablet     Refill:  3    olmesartan-hydroCHLOROthiazide (BENICAR HCT) 20-12.5 MG per tablet     Sig: Take 1 tablet by mouth as needed (blood pressure >702 systolic)     Dispense:  30 tablet     Refill:  3       Discussed with patient. Return in about 4 weeks (around 2/9/2021) for blood pressure check with me . I greatly appreciate the opportunity to meet Christopher Wynn and your confidence in allowing me to participate in her cardiovascular care. Curtis Donovan, NEO - NP  1/12/2021 2:56 PM CST                    This dictation was generated by voice recognition computer software. Although all attempts are made to edit dictation for accuracy, there may be errors in the transcription that are not intended.

## 2021-02-09 ENCOUNTER — OFFICE VISIT (OUTPATIENT)
Dept: CARDIOLOGY CLINIC | Age: 76
End: 2021-02-09
Payer: MEDICARE

## 2021-02-09 VITALS
BODY MASS INDEX: 31.15 KG/M2 | OXYGEN SATURATION: 98 % | HEART RATE: 67 BPM | HEIGHT: 61 IN | SYSTOLIC BLOOD PRESSURE: 126 MMHG | WEIGHT: 165 LBS | DIASTOLIC BLOOD PRESSURE: 70 MMHG

## 2021-02-09 DIAGNOSIS — I10 ESSENTIAL HYPERTENSION: Primary | ICD-10-CM

## 2021-02-09 DIAGNOSIS — I48.0 PAF (PAROXYSMAL ATRIAL FIBRILLATION) (HCC): ICD-10-CM

## 2021-02-09 PROCEDURE — 1036F TOBACCO NON-USER: CPT | Performed by: NURSE PRACTITIONER

## 2021-02-09 PROCEDURE — G8484 FLU IMMUNIZE NO ADMIN: HCPCS | Performed by: NURSE PRACTITIONER

## 2021-02-09 PROCEDURE — 4040F PNEUMOC VAC/ADMIN/RCVD: CPT | Performed by: NURSE PRACTITIONER

## 2021-02-09 PROCEDURE — 3017F COLORECTAL CA SCREEN DOC REV: CPT | Performed by: NURSE PRACTITIONER

## 2021-02-09 PROCEDURE — 1090F PRES/ABSN URINE INCON ASSESS: CPT | Performed by: NURSE PRACTITIONER

## 2021-02-09 PROCEDURE — 99213 OFFICE O/P EST LOW 20 MIN: CPT | Performed by: NURSE PRACTITIONER

## 2021-02-09 PROCEDURE — G8417 CALC BMI ABV UP PARAM F/U: HCPCS | Performed by: NURSE PRACTITIONER

## 2021-02-09 PROCEDURE — G8399 PT W/DXA RESULTS DOCUMENT: HCPCS | Performed by: NURSE PRACTITIONER

## 2021-02-09 PROCEDURE — G8427 DOCREV CUR MEDS BY ELIG CLIN: HCPCS | Performed by: NURSE PRACTITIONER

## 2021-02-09 PROCEDURE — 1123F ACP DISCUSS/DSCN MKR DOCD: CPT | Performed by: NURSE PRACTITIONER

## 2021-02-09 ASSESSMENT — ENCOUNTER SYMPTOMS
WHEEZING: 0
SHORTNESS OF BREATH: 0
CHEST TIGHTNESS: 0
GASTROINTESTINAL NEGATIVE: 1
COUGH: 0
SORE THROAT: 0

## 2021-02-09 NOTE — PROGRESS NOTES
[facility]   Established Patient Office Visit   [facility]. [id number] [facility]  [phone]        OFFICE VISIT:  2021    Mahesh Beckman - : 1945    Reason For Visit:  Shaggy Vanegas is a 76 y.o. female who is here for Follow-up (blood pressure follow up )    1. Essential hypertension    2. PAF (paroxysmal atrial fibrillation) ([address]      Patient with a history of hypertension, palpitations, paroxysmal SVT.     She is a patient of Dr. Boaz Spence. Patient was last seen in the office on 2021  Blood pressure elevated at 160/80. We have restarted Toprol-XL 50 mg daily as well as placing an order for her Benicar/HCT. Patient presents to clinic today for evaluation of her blood pressure. Patient states home blood pressures are improving. She is run anywhere from 170 systolic to 802 at home. DATA:     2019 Lexiscan   1.  Grossly negative Cardiolyte for the presence of ischemia or   infarction with normal wall motion and ejection fraction at rest.   2. Poor effort tolerance      2019 2D echo  Normal left ventricular size and function.   Mild concentric left ventricular hypertrophy.   Left ventricular ejection fraction is estimated at 67%. Mild mitral regurg.  Normal size left atrium.              Subjective    Mahesh Beckman is a 76 y.o. female with the following history as recorded in EpicCare:    Patient Active Problem List    Diagnosis Date Noted    Essential hypertension 2019     Priority: Low    New onset a-fib ([facility] [address] 2019     Priority: Low     Current Outpatient Medications   Medication Sig Dispense Refill    metoprolol succinate (TOPROL XL) 50 MG extended release tablet Take 1 tablet by mouth daily 90 tablet 3    omeprazole 20 MG EC tablet Take 20 mg by mouth daily      olmesartan-hydroCHLOROthiazide (BENICAR HCT) 20-12.5 MG per tablet Take 1 tablet by mouth as needed (blood pressure >564 systolic) 30 tablet 3  pantoprazole (PROTONIX) 40 MG tablet TK ONE T PO BID  4    Cholecalciferol (VITAMIN D3) 2000 units CAPS Take 2,000 Units by mouth      ondansetron (ZOFRAN ODT) 4 MG disintegrating tablet Take 1 tablet by mouth every 8 hours as needed for Nausea Take one prior to beginning colon prep 5 tablet 0    oxybutynin (DITROPAN-XL) 10 MG extended release tablet Take 10 mg by mouth as needed      escitalopram (LEXAPRO) 20 MG tablet Take 20 mg by mouth daily       No current facility-administered medications for this visit. Allergies: Patient has no known allergies.   Past Medical History:   Diagnosis Date    Arthritis     Atrial fibrillation ([address]     Carpal tunnel syndrome     x2 right hand    Depression     History of blood clots     behind left knee    History of vein stripping     left leg    Hyperlipidemia     Hypertension     [facility]fibromatosis [facility] [address] [facility]     Polio     as a child    Tachycardia      Past Surgical History:   Procedure Laterality Date    ANKLE SURGERY Left     3 surgeries    APPENDECTOMY      BLADDER SURGERY      x 2    CARPAL TUNNEL RELEASE Right     COLONOSCOPY  2019    Dr Leslie Correia Co-Retained stool, TVA, (-) dysplasia x 1, HP x 1, 3 yr recall    HERNIA REPAIR      HYSTERECTOMY      MASTECTOMY, BILATERAL Bilateral     2010 had implants redone    SINUS SURGERY      UPPER GASTROINTESTINAL ENDOSCOPY  2019    Dr Leslie Correia Co-Duodenitis, gastric antral ulcer, gastritis, repeat in 3 months    UPPER GASTROINTESTINAL ENDOSCOPY N/A 2020    Dr Denise Alcantar-Esophagitis, gastritis, well healed gastric ulceration      Family History   Problem Relation Age of Onset    Hypertension Father     Stroke Father          age 80    Brain Cancer Mother     Colon Cancer Neg Hx     Colon Polyps Neg Hx      Social History     Tobacco Use    Smoking status: Former Smoker     Packs/day: 1.00     Years: 24.00     Pack years: 24.00     Types: Cigarettes Quit date: 80     Years since quittin.1    Smokeless tobacco: Never Used   Substance Use Topics    Alcohol use: Yes     Comment: Occasional.          Review of Systems:    Review of Systems   Constitutional: Negative for activity change, chills, diaphoresis, fatigue and fever. HENT: Negative for congestion and sore throat. Respiratory: Negative for cough, chest tightness, shortness of breath and wheezing. Cardiovascular: Negative for chest pain, palpitations and leg swelling. Gastrointestinal: Negative. Genitourinary: Negative. Musculoskeletal: Negative. Neurological: Negative for dizziness, syncope, light-headedness and headaches. Psychiatric/Behavioral: Negative for confusion. The patient is not nervous/anxious. Objective:    /70   Pulse 67   Ht 5' 1\" (1.549 m)   Wt 165 lb (74.8 kg)   SpO2 98%   BMI 31.18 kg/m²     GENERAL - well developed and well nourished, conversant  HEENT   PERRLA, Hearing appears normal, gentleman lids are normal.  External inspection of ears and nose appear normal.  NECK - no thyromegaly, no JVD, trachea is in the midline  CARDIOVASCULAR  PMI is in the mid line clavicular position, Normal S1 and S2 with no systolic murmur. No S3 or S4    PULMONARY  no respiratory distress. No wheezes or rales. Lungs are clear to ausculation, normal respiratory effort. ABDOMEN   soft, non tender, no rebound  MUSCULOSKELETAL   range of motion of the upper and lower extermites appears normal and equal and is without pain   EXTREMITIES - no significant edema   NEUROLOGIC  gait and station are normal  SKIN - turgor is normal, can warm and dry.   PSYCHIATRIC - normal mood and affect, alert and orientated x 3,      ASSESSMENT:    ALL THE CARDIOLOGY PROBLEMS ARE LISTED ABOVE; HOWEVER, THE FOLLOWING SPECIFIC CARDIAC PROBLEMS / CONDITIONS WERE ADDRESSED AND TREATED DURING THE OFFICE VISIT TODAY: MEDICAL DECISION MAKING             Cardiac Specific Problem / Diagnosis  Discussion and Data Reviewed Diagnostic Procedures Ordered Management Options Selected           1. Hypertension   Improving   Review and summation of old records:    Blood pressures improving nicely. No Continue current medications: Yes                                                     No orders of the defined types were placed in this encounter. No orders of the defined types were placed in this encounter. Discussed with patient. Return for keep appt with Dr Eb Kovacs . I greatly appreciate the opportunity to meet Fouzia Connolly and your confidence in allowing me to participate in her cardiovascular care. NEO Newell NP  2/9/2021 9:07 AM CST                    This dictation was generated by voice recognition computer software. Although all attempts are made to edit dictation for accuracy, there may be errors in the transcription that are not intended.

## 2021-03-17 ENCOUNTER — OFFICE VISIT (OUTPATIENT)
Dept: CARDIOLOGY CLINIC | Age: 76
End: 2021-03-17
Payer: MEDICARE

## 2021-03-17 VITALS
WEIGHT: 158 LBS | DIASTOLIC BLOOD PRESSURE: 78 MMHG | HEART RATE: 65 BPM | BODY MASS INDEX: 31.02 KG/M2 | OXYGEN SATURATION: 98 % | HEIGHT: 60 IN | SYSTOLIC BLOOD PRESSURE: 132 MMHG

## 2021-03-17 DIAGNOSIS — I10 ESSENTIAL HYPERTENSION: Primary | ICD-10-CM

## 2021-03-17 PROCEDURE — G8417 CALC BMI ABV UP PARAM F/U: HCPCS | Performed by: INTERNAL MEDICINE

## 2021-03-17 PROCEDURE — 4040F PNEUMOC VAC/ADMIN/RCVD: CPT | Performed by: INTERNAL MEDICINE

## 2021-03-17 PROCEDURE — 99214 OFFICE O/P EST MOD 30 MIN: CPT | Performed by: INTERNAL MEDICINE

## 2021-03-17 PROCEDURE — G8484 FLU IMMUNIZE NO ADMIN: HCPCS | Performed by: INTERNAL MEDICINE

## 2021-03-17 PROCEDURE — G8399 PT W/DXA RESULTS DOCUMENT: HCPCS | Performed by: INTERNAL MEDICINE

## 2021-03-17 PROCEDURE — 1036F TOBACCO NON-USER: CPT | Performed by: INTERNAL MEDICINE

## 2021-03-17 PROCEDURE — 1090F PRES/ABSN URINE INCON ASSESS: CPT | Performed by: INTERNAL MEDICINE

## 2021-03-17 PROCEDURE — 1123F ACP DISCUSS/DSCN MKR DOCD: CPT | Performed by: INTERNAL MEDICINE

## 2021-03-17 PROCEDURE — 3017F COLORECTAL CA SCREEN DOC REV: CPT | Performed by: INTERNAL MEDICINE

## 2021-03-17 PROCEDURE — G8427 DOCREV CUR MEDS BY ELIG CLIN: HCPCS | Performed by: INTERNAL MEDICINE

## 2021-03-17 RX ORDER — VALSARTAN 40 MG/1
40 TABLET ORAL 2 TIMES DAILY
Qty: 180 TABLET | Refills: 3 | Status: ON HOLD | OUTPATIENT
Start: 2021-03-17 | End: 2021-05-11 | Stop reason: HOSPADM

## 2021-03-17 ASSESSMENT — ENCOUNTER SYMPTOMS
BLOOD IN STOOL: 0
VOMITING: 0
EYE DISCHARGE: 0
ABDOMINAL DISTENTION: 0
CONSTIPATION: 0
SHORTNESS OF BREATH: 0
BACK PAIN: 0
COUGH: 0
DIARRHEA: 0
WHEEZING: 0

## 2021-03-17 NOTE — PROGRESS NOTES
[id number] [facility]  Cardiology Office Note  [phone] [id number]  Phone: [phone]  Fax: [phone]                            Date:  3/17/2021  Patient: Chaparrita Mcpherson  Age:  76 y.o., 1945    Referral: No ref. provider found      PROBLEM LIST:    Patient Active Problem List    Diagnosis Date Noted    Essential hypertension 02/27/2019     Priority: Low    New onset a-fib ([address] 02/22/2019     Priority: Low     1. Hypertension, normal LV ejection fraction echo 4/17/2019, negative stress nuclear study 4/29/2019 with poor effort tolerance. 2. Palpitations with frequent PACs and short bursts of SVT (9 beats or less). 3. Chest pain with palpitations    PRESENTATION: Chaparrita Mcpherson is a 76y.o. year old female is for follow-up evaluation. Since February she has been noticing lightheadedness when she gets up in the morning there is not clearly postural in nature and can last all day. Her blood pressure has been running high at 1 60-6 80 systolic by her account. One episode her blood pressure was 190/86 mmHg. These episodes of lightheadedness occur 2-3 times a week and are not present every day. On other days her blood pressure is low. She does not take her olmesartan/hydrochlorothiazide daily as her blood pressure could be low on some days. She is taking her Toprol-XL. Occasional mild discomfort in the upper chest usually when her pressure is high but not related to exertion. Generally she feels well and has good energy. No leg swelling. No syncope. REVIEW OF SYSTEMS:  Review of Systems   Constitutional: Negative for activity change, fatigue and fever. HENT: Negative for ear pain, hearing loss and tinnitus. Eyes: Negative for discharge and visual disturbance. Respiratory: Negative for cough, shortness of breath and wheezing. Cardiovascular: Negative for chest pain, palpitations and leg swelling.    Gastrointestinal: Negative for abdominal distention, blood in stool, constipation, diarrhea and vomiting. Endocrine: Negative for cold intolerance, heat intolerance, polydipsia and polyuria. Genitourinary: Negative for dysuria and hematuria. Musculoskeletal: Negative for arthralgias, back pain and myalgias. Skin: Negative for pallor and rash. Neurological: Positive for light-headedness. Negative for seizures, syncope, weakness and headaches. Psychiatric/Behavioral: Negative for behavioral problems and dysphoric mood.        Past Medical History:      Diagnosis Date    Arthritis     Atrial fibrillation ([address]     Carpal tunnel syndrome     x2 right hand    Depression     History of blood clots     behind left knee    History of vein stripping     left leg    Hyperlipidemia     Hypertension     Neurofibromatosis ([address]     Polio     as a child    Tachycardia        Past Surgical History:      Procedure Laterality Date    ANKLE SURGERY Left     3 surgeries    APPENDECTOMY      BLADDER SURGERY      x 2    CARPAL TUNNEL RELEASE Right     COLONOSCOPY  11/06/2019    Dr Brian Hoyos Co-Retained stool, TVA, (-) dysplasia x 1, HP x 1, 3 yr recall    HERNIA REPAIR      HYSTERECTOMY      MASTECTOMY, BILATERAL Bilateral     2010 had implants redone    SINUS SURGERY      UPPER GASTROINTESTINAL ENDOSCOPY  11/06/2019    Dr Brian Hoyos Co-Duodenitis, gastric antral ulcer, gastritis, repeat in 3 months    UPPER GASTROINTESTINAL ENDOSCOPY N/A 2/18/2020    Dr Rosy Alcantar-Esophagitis, gastritis, well healed gastric ulceration        Medications:  Current Outpatient Medications   Medication Sig Dispense Refill    valsartan (DIOVAN) 40 MG tablet Take 1 tablet by mouth 2 times daily 180 tablet 3    metoprolol succinate (TOPROL XL) 50 MG extended release tablet Take 1 tablet by mouth daily 90 tablet 3    omeprazole 20 MG EC tablet Take 20 mg by mouth daily      pantoprazole (PROTONIX) 40 MG tablet Take 20 mg by mouth daily   4    Cholecalciferol (VITAMIN D3) 2000 units CAPS Take 2,000 Units by mouth      ondansetron (ZOFRAN ODT) 4 MG disintegrating tablet Take 1 tablet by mouth every 8 hours as needed for Nausea Take one prior to beginning colon prep 5 tablet 0    oxybutynin (DITROPAN-XL) 10 MG extended release tablet Take 10 mg by mouth as needed      escitalopram (LEXAPRO) 20 MG tablet Take 20 mg by mouth daily       No current facility-administered medications for this visit. Allergies:  Patient has no known allergies. Past Social History:  Social History     Socioeconomic History    Marital status:       Spouse name: Not on file    Number of children: Not on file    Years of education: Not on file    Highest education level: Not on file   Occupational History    Not on file   Social Needs    Financial resource strain: Not on file    Food insecurity     Worry: Not on file     Inability: Not on file    Transportation needs     Medical: Not on file     Non-medical: Not on file   Tobacco Use    Smoking status: Former Smoker     Packs/day: 1.00     Years: 24.00     Pack years: 24.00     Types: Cigarettes     Quit date: 80     Years since quittin.2    Smokeless tobacco: Never Used   Substance and Sexual Activity    Alcohol use: Yes     Comment: Occasional.    Drug use: No    Sexual activity: Not on file   Lifestyle    Physical activity     Days per week: Not on file     Minutes per session: Not on file    Stress: Not on file   Relationships    Social connections     Talks on phone: Not on file     Gets together: Not on file     Attends Religion service: Not on file     Active member of club or organization: Not on file     Attends meetings of clubs or organizations: Not on file     Relationship status: Not on file    Intimate partner violence     Fear of current or ex partner: Not on file     Emotionally abused: Not on file     Physically abused: Not on file     Forced sexual activity: Not on file Other Topics Concern    Not on file   Social History Narrative    Not on file       Family History:       Problem Relation Age of Onset    Hypertension Father     Stroke Father          age 80    Brain Cancer Mother     Colon Cancer Neg Hx     Colon Polyps Neg Hx          Physical Examination:  /78   Pulse 65   Ht 5' (1.524 m)   Wt 158 lb (71.7 kg)   SpO2 98%   BMI 30.86 kg/m²   Physical Exam  Constitutional:       General: She is not in acute distress. Appearance: She is not diaphoretic. Comments: Mild to moderate truncal obesity  Blood pressure right arm sitting 180/80 mmHg, pulse 56 bpm regular   HENT:      Mouth/Throat:      Pharynx: No oropharyngeal exudate. Eyes:      General: No scleral icterus. Right eye: No discharge. Left eye: No discharge. Neck:      Thyroid: No thyromegaly. Vascular: No JVD. Cardiovascular:      Rate and Rhythm: Normal rate and regular rhythm. No extrasystoles are present. Heart sounds: Normal heart sounds, S1 normal and S2 normal. No murmur. No systolic murmur. No diastolic murmur. No friction rub. No gallop. No S3 or S4 sounds. Comments: No JVD  No edema  No significant systolic or diastolic murmurs noted  No carotid bruits noted  Pulmonary:      Effort: Pulmonary effort is normal. No respiratory distress. Breath sounds: Normal breath sounds. No wheezing or rales. Chest:      Chest wall: No tenderness. Abdominal:      General: Bowel sounds are normal. There is no distension. Palpations: Abdomen is soft. There is no mass. Tenderness: There is no abdominal tenderness. There is no guarding or rebound. Hernia: No hernia is present. Comments: No palpable organomegaly  No renal bruits noted   Musculoskeletal: Normal range of motion. Skin:     General: Skin is warm. Coloration: Skin is not pale. Findings: No rash.    Neurological:      Mental Status: She is alert and oriented to person, Return in about 6 months (around 9/17/2021). Electronically signed by Zion Jarvis MD on 3/17/2021 at 4:23 PM    [facility]      Thisdictation was generated by voice recognition computer software. Although all attempts are made to edit the dictation for accuracy, there may be errors in the transcription that are not intended.

## 2021-05-06 ENCOUNTER — APPOINTMENT (OUTPATIENT)
Dept: CT IMAGING | Age: 76
DRG: 287 | End: 2021-05-06
Payer: MEDICARE

## 2021-05-06 ENCOUNTER — HOSPITAL ENCOUNTER (OUTPATIENT)
Dept: OTHER | Facility: HOSPITAL | Age: 76
Discharge: HOME | End: 2021-05-06

## 2021-05-06 ENCOUNTER — HOSPITAL ENCOUNTER (INPATIENT)
Age: 76
LOS: 3 days | Discharge: HOME | DRG: 287 | End: 2021-05-11
Attending: INTERNAL MEDICINE | Admitting: HOSPITALIST
Payer: MEDICARE

## 2021-05-06 ENCOUNTER — APPOINTMENT (OUTPATIENT)
Dept: GENERAL RADIOLOGY | Age: 76
DRG: 287 | End: 2021-05-06
Payer: MEDICARE

## 2021-05-06 DIAGNOSIS — R06.89 DYSPNEA AND RESPIRATORY ABNORMALITIES: Primary | ICD-10-CM

## 2021-05-06 DIAGNOSIS — R06.00 DYSPNEA AND RESPIRATORY ABNORMALITIES: Primary | ICD-10-CM

## 2021-05-06 DIAGNOSIS — R91.1 PULMONARY NODULE: ICD-10-CM

## 2021-05-06 DIAGNOSIS — R42 DIZZINESS: ICD-10-CM

## 2021-05-06 DIAGNOSIS — J32.0 CHRONIC MAXILLARY SINUSITIS: ICD-10-CM

## 2021-05-06 DIAGNOSIS — R00.1 BRADYCARDIA: ICD-10-CM

## 2021-05-06 PROBLEM — E78.5 HYPERLIPIDEMIA: Status: ACTIVE | Noted: 2018-08-17

## 2021-05-06 PROBLEM — R39.198 ABNORMAL URINATION: Status: ACTIVE | Noted: 2018-08-17

## 2021-05-06 PROBLEM — N39.3 FEMALE STRESS INCONTINENCE: Status: ACTIVE | Noted: 2017-11-09

## 2021-05-06 PROBLEM — N81.11 CYSTOCELE, MIDLINE: Status: ACTIVE | Noted: 2017-11-09

## 2021-05-06 PROBLEM — K46.9 ENTEROCELE: Status: ACTIVE | Noted: 2017-11-09

## 2021-05-06 PROBLEM — N81.6 RECTOCELE: Status: ACTIVE | Noted: 2017-11-09

## 2021-05-06 PROBLEM — N99.3 PROLAPSE OF VAGINAL VAULT AFTER HYSTERECTOMY: Status: ACTIVE | Noted: 2017-11-09

## 2021-05-06 LAB
ALBUMIN SERPL-MCNC: 3.7 G/DL (ref 3.5–5.2)
ALP BLD-CCNC: 156 U/L (ref 35–104)
ALT SERPL-CCNC: 29 U/L (ref 5–33)
ANION GAP SERPL CALCULATED.3IONS-SCNC: 8 MMOL/L (ref 7–19)
APTT: 29.2 SEC (ref 26–36.2)
AST SERPL-CCNC: 29 U/L (ref 5–32)
BASOPHILS ABSOLUTE: 0.1 K/UL (ref 0–0.2)
BASOPHILS RELATIVE PERCENT: 1.1 % (ref 0–1)
BILIRUB SERPL-MCNC: 0.5 MG/DL (ref 0.2–1.2)
BUN BLDV-MCNC: 19 MG/DL (ref 8–23)
CALCIUM SERPL-MCNC: 8.6 MG/DL (ref 8.8–10.2)
CHLORIDE BLD-SCNC: 107 MMOL/L (ref 98–111)
CO2: 25 MMOL/L (ref 22–29)
CREAT SERPL-MCNC: 0.8 MG/DL (ref 0.5–0.9)
EOSINOPHILS ABSOLUTE: 0.2 K/UL (ref 0–0.6)
EOSINOPHILS RELATIVE PERCENT: 2.7 % (ref 0–5)
GFR AFRICAN AMERICAN: >59
GFR NON-AFRICAN AMERICAN: >60
GLUCOSE BLD-MCNC: 88 MG/DL (ref 74–109)
HCT VFR BLD CALC: 42.2 % (ref 37–47)
HEMOGLOBIN: 13 G/DL (ref 12–16)
IMMATURE GRANULOCYTES #: 0.1 K/UL
INR BLD: 0.98 (ref 0.88–1.18)
LIPASE: 17 U/L (ref 13–60)
LYMPHOCYTES ABSOLUTE: 1.2 K/UL (ref 1.1–4.5)
LYMPHOCYTES RELATIVE PERCENT: 17.4 % (ref 20–40)
MCH RBC QN AUTO: 29.1 PG (ref 27–31)
MCHC RBC AUTO-ENTMCNC: 30.8 G/DL (ref 33–37)
MCV RBC AUTO: 94.4 FL (ref 81–99)
MONOCYTES ABSOLUTE: 0.5 K/UL (ref 0–0.9)
MONOCYTES RELATIVE PERCENT: 7.5 % (ref 0–10)
NEUTROPHILS ABSOLUTE: 4.9 K/UL (ref 1.5–7.5)
NEUTROPHILS RELATIVE PERCENT: 70.6 % (ref 50–65)
PDW BLD-RTO: 14 % (ref 11.5–14.5)
PLATELET # BLD: 222 K/UL (ref 130–400)
PMV BLD AUTO: 10.9 FL (ref 9.4–12.3)
POTASSIUM REFLEX MAGNESIUM: 4.4 MMOL/L (ref 3.5–5)
PRO-BNP: 397 PG/ML (ref 0–1800)
PROTHROMBIN TIME: 12.9 SEC (ref 12–14.6)
RBC # BLD: 4.47 M/UL (ref 4.2–5.4)
REASON FOR REJECTION: NORMAL
REJECTED TEST: NORMAL
SARS-COV-2, NAAT: NOT DETECTED
SODIUM BLD-SCNC: 140 MMOL/L (ref 136–145)
TOTAL PROTEIN: 7.1 G/DL (ref 6.6–8.7)
TROPONIN: <0.01 NG/ML (ref 0–0.03)
WBC # BLD: 7 K/UL (ref 4.8–10.8)

## 2021-05-06 PROCEDURE — 85610 PROTHROMBIN TIME: CPT

## 2021-05-06 PROCEDURE — 93005 ELECTROCARDIOGRAM TRACING: CPT | Performed by: NURSE PRACTITIONER

## 2021-05-06 PROCEDURE — 70450 CT HEAD/BRAIN W/O DYE: CPT

## 2021-05-06 PROCEDURE — 83880 ASSAY OF NATRIURETIC PEPTIDE: CPT

## 2021-05-06 PROCEDURE — 71275 CT ANGIOGRAPHY CHEST: CPT

## 2021-05-06 PROCEDURE — 85025 COMPLETE CBC W/AUTO DIFF WBC: CPT

## 2021-05-06 PROCEDURE — 85730 THROMBOPLASTIN TIME PARTIAL: CPT

## 2021-05-06 PROCEDURE — 80053 COMPREHEN METABOLIC PANEL: CPT

## 2021-05-06 PROCEDURE — 83690 ASSAY OF LIPASE: CPT

## 2021-05-06 PROCEDURE — G0378 HOSPITAL OBSERVATION PER HR: HCPCS

## 2021-05-06 PROCEDURE — 6360000004 HC RX CONTRAST MEDICATION: Performed by: NURSE PRACTITIONER

## 2021-05-06 PROCEDURE — 99283 EMERGENCY DEPT VISIT LOW MDM: CPT

## 2021-05-06 PROCEDURE — 36415 COLL VENOUS BLD VENIPUNCTURE: CPT

## 2021-05-06 PROCEDURE — 87635 SARS-COV-2 COVID-19 AMP PRB: CPT

## 2021-05-06 PROCEDURE — 84484 ASSAY OF TROPONIN QUANT: CPT

## 2021-05-06 PROCEDURE — 71046 X-RAY EXAM CHEST 2 VIEWS: CPT

## 2021-05-06 RX ORDER — POTASSIUM CHLORIDE 7.45 MG/ML
10 INJECTION INTRAVENOUS PRN
Status: DISCONTINUED | OUTPATIENT
Start: 2021-05-06 | End: 2021-05-11 | Stop reason: HOSPADM

## 2021-05-06 RX ORDER — ACETAMINOPHEN 325 MG/1
650 TABLET ORAL EVERY 6 HOURS PRN
Status: DISCONTINUED | OUTPATIENT
Start: 2021-05-06 | End: 2021-05-11 | Stop reason: HOSPADM

## 2021-05-06 RX ORDER — ESCITALOPRAM OXALATE 10 MG/1
20 TABLET ORAL DAILY
Status: DISCONTINUED | OUTPATIENT
Start: 2021-05-07 | End: 2021-05-11 | Stop reason: HOSPADM

## 2021-05-06 RX ORDER — POLYETHYLENE GLYCOL 3350 17 G/17G
17 POWDER, FOR SOLUTION ORAL DAILY PRN
Status: DISCONTINUED | OUTPATIENT
Start: 2021-05-06 | End: 2021-05-11 | Stop reason: HOSPADM

## 2021-05-06 RX ORDER — ONDANSETRON 2 MG/ML
4 INJECTION INTRAMUSCULAR; INTRAVENOUS EVERY 6 HOURS PRN
Status: DISCONTINUED | OUTPATIENT
Start: 2021-05-06 | End: 2021-05-11 | Stop reason: HOSPADM

## 2021-05-06 RX ORDER — MAGNESIUM SULFATE IN WATER 40 MG/ML
2000 INJECTION, SOLUTION INTRAVENOUS PRN
Status: DISCONTINUED | OUTPATIENT
Start: 2021-05-06 | End: 2021-05-11 | Stop reason: HOSPADM

## 2021-05-06 RX ORDER — OLMESARTAN MEDOXOMIL AND HYDROCHLOROTHIAZIDE 20/12.5 20; 12.5 MG/1; MG/1
1 TABLET ORAL DAILY
Status: ON HOLD | COMMUNITY
End: 2021-05-11 | Stop reason: HOSPADM

## 2021-05-06 RX ORDER — PANTOPRAZOLE SODIUM 40 MG/1
40 TABLET, DELAYED RELEASE ORAL
Status: DISCONTINUED | OUTPATIENT
Start: 2021-05-07 | End: 2021-05-11 | Stop reason: HOSPADM

## 2021-05-06 RX ORDER — POTASSIUM CHLORIDE 20 MEQ/1
40 TABLET, EXTENDED RELEASE ORAL PRN
Status: DISCONTINUED | OUTPATIENT
Start: 2021-05-06 | End: 2021-05-11 | Stop reason: HOSPADM

## 2021-05-06 RX ORDER — SODIUM CHLORIDE 0.9 % (FLUSH) 0.9 %
5-40 SYRINGE (ML) INJECTION EVERY 12 HOURS SCHEDULED
Status: DISCONTINUED | OUTPATIENT
Start: 2021-05-06 | End: 2021-05-11 | Stop reason: HOSPADM

## 2021-05-06 RX ORDER — OXYBUTYNIN CHLORIDE 5 MG/1
10 TABLET, EXTENDED RELEASE ORAL NIGHTLY
Status: DISCONTINUED | OUTPATIENT
Start: 2021-05-07 | End: 2021-05-07

## 2021-05-06 RX ORDER — SODIUM CHLORIDE 9 MG/ML
25 INJECTION, SOLUTION INTRAVENOUS PRN
Status: DISCONTINUED | OUTPATIENT
Start: 2021-05-06 | End: 2021-05-11 | Stop reason: HOSPADM

## 2021-05-06 RX ORDER — PROMETHAZINE HYDROCHLORIDE 12.5 MG/1
12.5 TABLET ORAL EVERY 6 HOURS PRN
Status: DISCONTINUED | OUTPATIENT
Start: 2021-05-06 | End: 2021-05-11 | Stop reason: HOSPADM

## 2021-05-06 RX ORDER — SODIUM CHLORIDE 0.9 % (FLUSH) 0.9 %
5-40 SYRINGE (ML) INJECTION PRN
Status: DISCONTINUED | OUTPATIENT
Start: 2021-05-06 | End: 2021-05-11 | Stop reason: HOSPADM

## 2021-05-06 RX ORDER — ACETAMINOPHEN 650 MG/1
650 SUPPOSITORY RECTAL EVERY 6 HOURS PRN
Status: DISCONTINUED | OUTPATIENT
Start: 2021-05-06 | End: 2021-05-11 | Stop reason: HOSPADM

## 2021-05-06 RX ADMIN — IOPAMIDOL 90 ML: 755 INJECTION, SOLUTION INTRAVENOUS at 17:51

## 2021-05-06 ASSESSMENT — ENCOUNTER SYMPTOMS
SPUTUM PRODUCTION: 0
NAUSEA: 0
APNEA: 0
COUGH: 0
ABDOMINAL DISTENTION: 0
PHOTOPHOBIA: 0
DIARRHEA: 0
SORE THROAT: 0
VOMITING: 0
HEMOPTYSIS: 0
RHINORRHEA: 0
ABDOMINAL PAIN: 0
SHORTNESS OF BREATH: 1
SWOLLEN GLANDS: 0
WHEEZING: 0

## 2021-05-06 ASSESSMENT — PAIN SCALES - GENERAL: PAINLEVEL_OUTOF10: 0

## 2021-05-06 NOTE — ED PROVIDER NOTES
[facility] [address]      Pt Name: Mady Morrison  MRN: [id number]  [place] 1945  Date of evaluation: 5/6/2021  Provider: NEO Patel [id number]       Chief Complaint   Patient presents with    Shortness of Breath     pt states she has been weak and soa for approx 6 weeks. was seen at [facility] today, sent her due to have hr in 40's. ems gave 0.5 of atropine. HISTORY OF PRESENT ILLNESS   (Location/Symptom, Timing/Onset, Context/Setting, Quality, Duration, Modifying Factors, Severity)  Note limiting factors. Mady Morrison is a 76 y.o. female who presents to the emergency department with concern for intermittent shortness of breath that seems exertional and intermittent lightheadedness. She states these have been going on for a few months and she has seen Dr Prabha Wagner for this in March. Her blood pressure medications were adjusted at the time. She went to an urgent care in [facility] and was told her HR was too low for them to see her (40) and she was transported here via EMS who gave her 0.5 mg of atropine. Tonight she endorses her lightheadedness to be more postural, worse with standing and movement. Her shortness of breath is also exertional. Reports that she recently was able to walk up to 3 miles a day and now gets winded just walking around her house. She did try meclizine at home without relief in symptoms.       Shortness of Breath  Severity:  Mild  Onset quality:  Gradual  Timing:  Intermittent  Progression:  Waxing and waning  Chronicity:  Recurrent  Context: activity    Context: not animal exposure, not emotional upset, not fumes, not known allergens, not occupational exposure, not pollens, not smoke exposure, not strong odors, not URI and not weather changes    Relieved by:  Nothing  Worsened by:  Nothing  Ineffective treatments:  None tried  Associated symptoms: no abdominal pain, no chest pain, no claudication, no cough, no diaphoresis, no ear pain, no fever, no headaches, no hemoptysis, no neck pain, no PND, no rash, no sore throat, no sputum production, no syncope, no swollen glands, no vomiting and no wheezing    Risk factors: no recent alcohol use, no family hx of DVT, no hx of cancer, no hx of PE/DVT, no obesity, no oral contraceptive use, no prolonged immobilization, no recent surgery and no tobacco use        Nursing Notes were reviewed. REVIEW OF SYSTEMS    (2-9 systems for level 4, 10 or more for level 5)     Review of Systems   Constitutional: Negative for activity change, appetite change, diaphoresis, fatigue, fever and unexpected weight change. HENT: Negative for ear pain, postnasal drip, rhinorrhea and sore throat. Eyes: Negative for photophobia. Respiratory: Positive for shortness of breath. Negative for apnea, cough, hemoptysis, sputum production and wheezing. Cardiovascular: Negative for chest pain, claudication, syncope and PND. Gastrointestinal: Negative for abdominal distention, abdominal pain, diarrhea, nausea and vomiting. Genitourinary: Negative for flank pain. Musculoskeletal: Negative for neck pain. Skin: Negative for rash. Neurological: Positive for light-headedness. Negative for dizziness, syncope, weakness, numbness and headaches. Except as noted above the remainder of the review of systems was reviewed and negative.        PAST MEDICAL HISTORY     Past Medical History:   Diagnosis Date    Arthritis     Atrial fibrillation ([address]     Carpal tunnel syndrome     x2 right hand    Depression     History of blood clots     behind left knee    History of vein stripping     left leg    Hyperlipidemia     Hypertension     Neurofibromatosis ([address]     Polio     as a child    Tachycardia          SURGICAL HISTORY       Past Surgical History:   Procedure Laterality Date    ANKLE SURGERY Left     3 surgeries    APPENDECTOMY      BLADDER SURGERY      x 2    CARPAL TUNNEL RELEASE Right     COLONOSCOPY  2019    Dr Solomon Goddard Co-Retained stool, TVA, (-) dysplasia x 1, HP x 1, 3 yr recall    HERNIA REPAIR      HYSTERECTOMY      MASTECTOMY, BILATERAL Bilateral     2010 had implants redone    SINUS SURGERY      UPPER GASTROINTESTINAL ENDOSCOPY  2019    Dr Solomon Goddard Co-Duodenitis, gastric antral ulcer, gastritis, repeat in 3 months    UPPER GASTROINTESTINAL ENDOSCOPY N/A 2020    Dr Feli Riddle Alcantar-Esophagitis, gastritis, well healed gastric ulceration          CURRENT MEDICATIONS       Current Discharge Medication List      CONTINUE these medications which have NOT CHANGED    Details   valsartan (DIOVAN) 40 MG tablet Take 1 tablet by mouth 2 times daily  Qty: 180 tablet, Refills: 3      metoprolol succinate (TOPROL XL) 50 MG extended release tablet Take 1 tablet by mouth daily  Qty: 90 tablet, Refills: 3      omeprazole 20 MG EC tablet Take 20 mg by mouth daily      pantoprazole (PROTONIX) 40 MG tablet Take 20 mg by mouth daily   Refills: 4      Cholecalciferol (VITAMIN D3) 2000 units CAPS Take 2,000 Units by mouth      ondansetron (ZOFRAN ODT) 4 MG disintegrating tablet Take 1 tablet by mouth every 8 hours as needed for Nausea Take one prior to beginning colon prep  Qty: 5 tablet, Refills: 0      oxybutynin (DITROPAN-XL) 10 MG extended release tablet Take 10 mg by mouth as needed      escitalopram (LEXAPRO) 20 MG tablet Take 20 mg by mouth daily             ALLERGIES     Patient has no known allergies. FAMILY HISTORY       Family History   Problem Relation Age of Onset    Hypertension Father     Stroke Father          age 80    Brain Cancer Mother     Colon Cancer Neg Hx     Colon Polyps Neg Hx           SOCIAL HISTORY       Social History     Socioeconomic History    Marital status:       Spouse name: Not on file    Number of children: Not on file    Years of education: Not on file    Highest education level: Not on file   Occupational Movements: Extraocular movements intact. Pupils: Pupils are equal, round, and reactive to light. Neck:      Musculoskeletal: Normal range of motion and neck supple. Vascular: No JVD. Cardiovascular:      Rate and Rhythm: Regular rhythm. Bradycardia present. Pulses: No decreased pulses. Heart sounds: No murmur. No gallop. Comments: Hr mid 55s throughout stay here  Pulmonary:      Effort: Pulmonary effort is normal.      Breath sounds: Normal breath sounds. Chest:      Chest wall: No mass, deformity, tenderness, crepitus or edema. There is no dullness to percussion. Abdominal:      General: Bowel sounds are normal.      Palpations: Abdomen is soft. Musculoskeletal:      Right lower leg: She exhibits no tenderness. No edema. Left lower leg: She exhibits no tenderness. No edema. Skin:     General: Skin is warm and dry. Capillary Refill: Capillary refill takes 2 to 3 seconds. Neurological:      General: No focal deficit present. Mental Status: She is alert and oriented to person, place, and time. DIAGNOSTIC RESULTS     EKG: All EKG's are interpreted by the Emergency Department Physician who either signs or Co-signs this chart in the absence of a cardiologist.    Sinus narinder rate 47. No stemi. Viewed by Dr. Mickey Bonilla:   Non-plain film images such as CT, Ultrasound and MRI are read by the radiologist. Plain radiographic images are visualized and preliminarily interpreted by the emergency physician with the below findings:        Interpretation per the Radiologist below, if available at the time of this note:    CTA PULMONARY W CONTRAST   Final Result   Impression:   1. No CT angiographic evidence of pulmonary enema circulation aortic   pathology. 2. Cardiomegaly. 3. Chronic lung changes without parenchymal infiltrates. 4. Right upper lobe 5 mm pulmonary nodule, follow-up recommended   5. Cholelithiasis. 6. Atrophic left kidney.    7.. Prominent symptoms currently could be rate related. She is on Diovan and Toprol XL. Her rate has remain in the low to mid 40s throughout her ER visit. Case was discussed with cardiology and hospitalist who agree with admission for observation. We will hold Toprol at this time. Patient lives alone and is comfortable with this plan. CRITICAL CARE TIME     CONSULTS:  IP CONSULT TO CARDIOLOGY  IP CONSULT TO HOSPITALIST    PROCEDURES:  Unless otherwise noted below, none     Procedures         FINAL IMPRESSION      1. Dyspnea and respiratory abnormalities    2. Pulmonary nodule    3. Bradycardia          DISPOSITION/PLAN   DISPOSITION        PATIENT REFERRED TO:  No follow-up provider specified. DISCHARGE MEDICATIONS:  Current Discharge Medication List        Controlled Substances Monitoring:     No flowsheet data found.     (Please note that portions of this note were completed with a voice recognition program.  Efforts were made to edit the dictations but occasionally words are mis-transcribed.)    NEO Hill CNP (electronically signed)  Attending Emergency Physician         NEO Hill CNP  05/06/21 [id number]

## 2021-05-06 NOTE — ED NOTES
Bed: 14  Expected date:   Expected time:   Means of arrival:   Comments:  Cristina Mei 89, RN  05/06/21 [id number]

## 2021-05-07 LAB
ANION GAP SERPL CALCULATED.3IONS-SCNC: 4 MMOL/L (ref 7–19)
BUN BLDV-MCNC: 17 MG/DL (ref 8–23)
CALCIUM SERPL-MCNC: 9.1 MG/DL (ref 8.8–10.2)
CHLORIDE BLD-SCNC: 107 MMOL/L (ref 98–111)
CO2: 29 MMOL/L (ref 22–29)
CREAT SERPL-MCNC: 1 MG/DL (ref 0.5–0.9)
GFR AFRICAN AMERICAN: >59
GFR NON-AFRICAN AMERICAN: 54
GLUCOSE BLD-MCNC: 85 MG/DL (ref 74–109)
HCT VFR BLD CALC: 39.5 % (ref 37–47)
HEMOGLOBIN: 12.5 G/DL (ref 12–16)
LV EF: 58 %
LVEF MODALITY: NORMAL
MCH RBC QN AUTO: 29.4 PG (ref 27–31)
MCHC RBC AUTO-ENTMCNC: 31.6 G/DL (ref 33–37)
MCV RBC AUTO: 92.9 FL (ref 81–99)
PDW BLD-RTO: 14.2 % (ref 11.5–14.5)
PLATELET # BLD: 215 K/UL (ref 130–400)
PMV BLD AUTO: 10.7 FL (ref 9.4–12.3)
POTASSIUM REFLEX MAGNESIUM: 4.2 MMOL/L (ref 3.5–5)
RBC # BLD: 4.25 M/UL (ref 4.2–5.4)
SODIUM BLD-SCNC: 140 MMOL/L (ref 136–145)
TROPONIN: <0.01 NG/ML (ref 0–0.03)
TROPONIN: <0.01 NG/ML (ref 0–0.03)
WBC # BLD: 6.2 K/UL (ref 4.8–10.8)

## 2021-05-07 PROCEDURE — 36415 COLL VENOUS BLD VENIPUNCTURE: CPT

## 2021-05-07 PROCEDURE — 99204 OFFICE O/P NEW MOD 45 MIN: CPT | Performed by: INTERNAL MEDICINE

## 2021-05-07 PROCEDURE — G0378 HOSPITAL OBSERVATION PER HR: HCPCS

## 2021-05-07 PROCEDURE — 80048 BASIC METABOLIC PNL TOTAL CA: CPT

## 2021-05-07 PROCEDURE — C8929 TTE W OR WO FOL WCON,DOPPLER: HCPCS

## 2021-05-07 PROCEDURE — 6360000004 HC RX CONTRAST MEDICATION: Performed by: INTERNAL MEDICINE

## 2021-05-07 PROCEDURE — 2580000003 HC RX 258: Performed by: PHYSICIAN ASSISTANT

## 2021-05-07 PROCEDURE — 96372 THER/PROPH/DIAG INJ SC/IM: CPT

## 2021-05-07 PROCEDURE — 6370000000 HC RX 637 (ALT 250 FOR IP): Performed by: PHYSICIAN ASSISTANT

## 2021-05-07 PROCEDURE — 84484 ASSAY OF TROPONIN QUANT: CPT

## 2021-05-07 PROCEDURE — 6370000000 HC RX 637 (ALT 250 FOR IP): Performed by: HOSPITALIST

## 2021-05-07 PROCEDURE — 85027 COMPLETE CBC AUTOMATED: CPT

## 2021-05-07 PROCEDURE — 6360000002 HC RX W HCPCS: Performed by: PHYSICIAN ASSISTANT

## 2021-05-07 RX ORDER — VALSARTAN 40 MG/1
40 TABLET ORAL 2 TIMES DAILY
Status: DISCONTINUED | OUTPATIENT
Start: 2021-05-07 | End: 2021-05-11 | Stop reason: HOSPADM

## 2021-05-07 RX ADMIN — VALSARTAN 40 MG: 40 TABLET ORAL at 12:05

## 2021-05-07 RX ADMIN — PANTOPRAZOLE SODIUM 40 MG: 40 TABLET, DELAYED RELEASE ORAL at 12:04

## 2021-05-07 RX ADMIN — ESCITALOPRAM OXALATE 20 MG: 10 TABLET ORAL at 12:04

## 2021-05-07 RX ADMIN — PERFLUTREN 1.65 MG: 6.52 INJECTION, SUSPENSION INTRAVENOUS at 14:30

## 2021-05-07 RX ADMIN — ENOXAPARIN SODIUM 40 MG: 40 INJECTION SUBCUTANEOUS at 12:05

## 2021-05-07 RX ADMIN — SODIUM CHLORIDE, PRESERVATIVE FREE 10 ML: 5 INJECTION INTRAVENOUS at 12:05

## 2021-05-07 ASSESSMENT — PAIN SCALES - GENERAL
PAINLEVEL_OUTOF10: 0

## 2021-05-07 ASSESSMENT — ENCOUNTER SYMPTOMS
BLOOD IN STOOL: 0
ABDOMINAL DISTENTION: 0
DIARRHEA: 0
SINUS PAIN: 0
NAUSEA: 0
GASTROINTESTINAL NEGATIVE: 1
TROUBLE SWALLOWING: 0
WHEEZING: 0
SORE THROAT: 0
VOMITING: 0
COUGH: 0
ABDOMINAL PAIN: 0
CONSTIPATION: 0
SHORTNESS OF BREATH: 1

## 2021-05-07 NOTE — CONSULTS
[facility]) Cardiology   Consult Note   Yenni Ledbetter       Requesting MD:  Neris Grossman, *   Admit Status:  Observation [104]       History obtained from:   [x] Patient  [] Other (specify):     Patient:  Natalia Lara  [id number]     Chief Complaint:   Chief Complaint   Patient presents with    Shortness of Breath     pt states she has been weak and soa for approx 6 weeks. was seen at [facility] today, sent her due to have hr in 40's. ems gave 0.5 of atropine. HPI: Ms. Campbell Be is a 76 y.o. female with a history of Hypertension, hyperlipidemia  She has been complaining of lightheadedness for the past few months multiple office visits, she came to the ER with shortness of breath and lightheadedness, she was found to have bradycardia and she was given 0.5 mg of atropine, subsequently it was admitted to the hospital for observation and further evaluation and management    Patient denies any active chest pain, no shortness of breath at rest but only with exertion  Lightheadedness is more worse when she stands up or with movement, she mentioned that she tried meclizine without improvement      Review of Systems:  Review of Systems   Constitutional: Negative. Respiratory: Positive for shortness of breath. Cardiovascular: Positive for palpitations. Gastrointestinal: Negative. Endocrine: Negative. Genitourinary: Negative. Musculoskeletal: Negative. Skin: Negative. Neurological: Positive for dizziness. Hematological: Negative. All other systems reviewed and are negative.       Cardiac Specific Data:  Specialty Problems        Cardiology Problems    Hyperlipidemia        New onset a-fib [facility])        Essential hypertension        * (Principal) Symptomatic bradycardia              Past Medical History:  Past Medical History:   Diagnosis Date    Arthritis     Atrial fibrillation (HCC)     Carpal tunnel syndrome     x2 right hand    Depression     History of blood clots behind left knee    History of vein stripping     left leg    Hyperlipidemia     Hypertension     Neurofibromatosis ([address]     Polio     as a child    Tachycardia         Past Surgical History:  Past Surgical History:   Procedure Laterality Date    ANKLE SURGERY Left     3 surgeries    APPENDECTOMY      BLADDER SURGERY      x 2    CARPAL TUNNEL RELEASE Right     COLONOSCOPY  2019    Dr Ezra Souza Co-Retained stool, TVA, (-) dysplasia x 1, HP x 1, 3 yr recall    HERNIA REPAIR      HYSTERECTOMY      MASTECTOMY, BILATERAL Bilateral     2010 had implants redone    SINUS SURGERY      UPPER GASTROINTESTINAL ENDOSCOPY  2019    Dr Ezra Souza Co-Duodenitis, gastric antral ulcer, gastritis, repeat in 3 months    UPPER GASTROINTESTINAL ENDOSCOPY N/A 2020    Dr Mercedez Alcantar-Esophagitis, gastritis, well healed gastric ulceration        Past Family History:  Family History   Problem Relation Age of Onset    Hypertension Father     Stroke Father          age 80    Brain Cancer Mother     Colon Cancer Neg Hx     Colon Polyps Neg Hx        Past Social History:  Social History     Socioeconomic History    Marital status:       Spouse name: Not on file    Number of children: Not on file    Years of education: Not on file    Highest education level: Not on file   Occupational History    Not on file   Social Needs    Financial resource strain: Not on file    Food insecurity     Worry: Not on file     Inability: Not on file    Transportation needs     Medical: Not on file     Non-medical: Not on file   Tobacco Use    Smoking status: Former Smoker     Packs/day: 1.00     Years: 24.00     Pack years: 24.00     Types: Cigarettes     Quit date: 80     Years since quittin.3    Smokeless tobacco: Never Used   Substance and Sexual Activity    Alcohol use: Yes     Comment: Occasional.    Drug use: No    Sexual activity: Not on file   Lifestyle    Physical activity Days per week: Not on file     Minutes per session: Not on file    Stress: Not on file   Relationships    Social connections     Talks on phone: Not on file     Gets together: Not on file     Attends Synagogue service: Not on file     Active member of club or organization: Not on file     Attends meetings of clubs or organizations: Not on file     Relationship status: Not on file    Intimate partner violence     Fear of current or ex partner: Not on file     Emotionally abused: Not on file     Physically abused: Not on file     Forced sexual activity: Not on file   Other Topics Concern    Not on file   Social History Narrative    Not on file       Allergies:  No Known Allergies    Home Meds:  Prior to Admission medications    Medication Sig Start Date End Date Taking?  Authorizing Provider   olmesartan-hydroCHLOROthiazide (BENICAR HCT) 20-12.5 MG per tablet Take 1 tablet by mouth daily   Yes Historical Provider, MD   valsartan (DIOVAN) 40 MG tablet Take 1 tablet by mouth 2 times daily 3/17/21  Yes Lorrie Moreno MD   metoprolol succinate (TOPROL XL) 50 MG extended release tablet Take 1 tablet by mouth daily 1/12/21  Yes Annie Najjar, APRN - NP   omeprazole 20 MG EC tablet Take 20 mg by mouth daily   Yes Historical Provider, MD   Cholecalciferol (VITAMIN D3) 2000 units CAPS Take 2,000 Units by mouth   Yes Historical Provider, MD   oxybutynin (DITROPAN-XL) 10 MG extended release tablet Take 10 mg by mouth as needed   Yes Historical Provider, MD   escitalopram (LEXAPRO) 20 MG tablet Take 20 mg by mouth daily   Yes Historical Provider, MD   pantoprazole (PROTONIX) 40 MG tablet Take 20 mg by mouth daily  11/8/19   Historical Provider, MD   ondansetron (ZOFRAN ODT) 4 MG disintegrating tablet Take 1 tablet by mouth every 8 hours as needed for Nausea Take one prior to beginning colon prep 10/14/19   NEO Stallings NP       Current Meds:   valsartan  40 mg Oral BID    escitalopram  20 mg Oral Daily    pantoprazole  40 mg Oral QAM AC    sodium chloride flush  5-40 mL Intravenous 2 times per day    enoxaparin  40 mg Subcutaneous Daily       Current Infused Meds:   sodium chloride         Physical Exam:  Vitals:    05/07/21 1046   BP: 133/64   Pulse: (!) 48   Resp: 14   Temp: 97.6 °F (36.4 °C)   SpO2: 96%       Intake/Output Summary (Last 24 hours) at 5/7/2021 1405  Last data filed at 5/7/2021 1050  Gross per 24 hour   Intake --   Output 1000 ml   Net -1000 ml     Estimated body mass index is 30.47 kg/m² as calculated from the following:    Height as of this encounter: 5' (1.524 m). Weight as of this encounter: 156 lb (70.8 kg). Physical Exam  Vitals signs reviewed. Constitutional:       Appearance: Normal appearance. She is obese. HENT:      Head: Normocephalic and atraumatic. Mouth/Throat:      Mouth: Mucous membranes are moist.   Cardiovascular:      Rate and Rhythm: Regular rhythm. Bradycardia present. Pulses: Normal pulses. Heart sounds: Normal heart sounds. No murmur. Pulmonary:      Effort: Pulmonary effort is normal.      Breath sounds: Normal breath sounds. Abdominal:      General: Bowel sounds are normal.      Palpations: Abdomen is soft. Tenderness: There is no abdominal tenderness. Musculoskeletal: Normal range of motion. Right lower leg: No edema. Left lower leg: No edema. Skin:     General: Skin is warm. Neurological:      General: No focal deficit present. Mental Status: She is alert and oriented to person, place, and time.    Psychiatric:         Mood and Affect: Mood normal.         Behavior: Behavior normal.         Labs:  Recent Labs     05/06/21  1711 05/07/21  0608   WBC 7.0 6.2   HGB 13.0 12.5    215       Recent Labs     05/06/21  1708 05/07/21  0608    140   K 4.4 4.2    107   CO2 25 29   BUN 19 17   CREATININE 0.8 1.0*   LABGLOM >60 54*   CALCIUM 8.6* 9.1       CK, CKMB, Troponin:   Recent Labs     05/06/21  1708 05/06/21  2342 05/07/21  0608   TROPONINI <0.01 <0.01 <0.01       Last 3 BNP:  Recent Labs     05/06/21  1708   PROBNP 397         IMAGING:    Telemetry -sinus bradycardia  No acute ischemic changes    ECHO -pending for this admission    Last echo April 2019  Technical Quality: Adequate visualization     Patient Status: Outpatient     Indications:Chest pain.      Conclusions      Summary   Normal left ventricular size and function. Mild concentric left ventricular hypertrophy. Left ventricular ejection fraction is estimated at 67%. Signature      ----------------------------------------------------------------   Electronically signed by David Yeh MD(Interpreting   physician) on 04/17/2019 08:35 PM   ----------------------------------------------------------------      Nuclear stress test - April 2019  Conclusions:   1. Grossly negative Cardiolyte for the presence of ischemia or   infarction with normal wall motion and ejection fraction at rest.   2. Poor effort tolerance   Signed by Dr Jamie Ochoa on 4/29/2019 5:03 PM       Xr Chest (2 Vw)    Result Date: 5/6/2021  1. No focal infiltrate or effusion. 2. Stable mild bronchial wall thickening. Signed by Dr Madhavi Byrne on 5/6/2021 5:01 PM    Ct Head Wo Contrast    Result Date: 5/6/2021  Impression: 1. No CT evidence of an acute intracranial process. Signed by Dr Guru Ruiz on 5/6/2021 6:28 PM      Cta Pulmonary W Contrast    Result Date: 5/6/2021  Impression: 1. No CT angiographic evidence of pulmonary enema circulation aortic pathology. 2. Cardiomegaly. 3. Chronic lung changes without parenchymal infiltrates. 4. Right upper lobe 5 mm pulmonary nodule, follow-up recommended 5. Cholelithiasis. 6. Atrophic left kidney. 7.. Prominent adrenal glands, hyperplasia versus adenomatous change considered. Signed by Dr Guru Ruiz on 5/6/2021 6:34 PM       Assessment and Plan:  1.  Symptomatic bradycardia -she received 1 dose of atropine 0.5 mg in the ER with partial improvement in her symptoms, admitted to telemetry for monitoring she still bradycardic but stable hemodynamically and clinically, we stopped metoprolol, she has no active chest pain initial troponin is negative no acute changes EKG, will monitor her on the weekend and she would be seen by her primary cardiologist on Monday, her last echo was in 2019 with normal ejection fraction and mild LVH, will obtain 2D echo during this admission, last stress test in 2019 was negative for ischemia  2. Hypertension -continue with Valsartan 40 mg twice daily       Thank you for the consult, we appreciate the opportunity to provide care to your patients. Feel free to contact me if I can be of any further assistance.       Electronically signed by Robbi Ennis MD on 5/7/2021 at 2:05 PM    Robbi Ennis MD, [facility], [facility]  Interventional Cardiologist, Endovascular Specialist   Medical Director, [facility] De [facility]

## 2021-05-07 NOTE — PROGRESS NOTES
[facility]      Progress Note    Patient:  Enedina Patel  YOB: 1945  Date of Service: 5/7/2021  MRN: [id number]   Acct: [de-identified]   Primary Care Physician: No primary care provider on file. Advance Directive: Full Code  Admit Date: 5/6/2021       Hospital Day: 1     Portions of this note have been copied forward, however, updated to reflect the most current clinical status of this patient. CHIEF COMPLAINT dizziness     SUBJECTIVE:  Ms. Jarrod De Souza was resting comfortably in bed this morning. She reports being dizzy with activity and at rest at times as well. States she has SOB with exertion. She denies chest pain. CUMULATIVE HOSPITAL COURSE:   The patient is a 76 y.o. female with PMH paroxysmal atrial fibrillation, depression, HTN, HLD, neurofibromatosis who presented to an outside clinic complaining of dyspnea and lightheadedness. She states these symptoms have been worsening for the last several months but became more severe over the last 3-4 days. Lightheadedness and dyspnea have limited her Physical activity. States symptoms are worse with exertion, improved with rest. On day of admission, she presented to a clinic and was found to be bradycardic and hypotensive. She received 0.5 mg Atropine and EMS was called to bring her to this facility. She takes Toprol XL. Does not take anticoagulation and has history of Epistaxis from baby Aspirin. She was admitted to hospital medicine with symptomatic bradycardia.        Review of Systems   Constitutional: Negative for chills, diaphoresis, fatigue and fever. HENT: Negative for congestion, ear pain, sinus pain, sore throat and trouble swallowing. Eyes: Negative for visual disturbance. Respiratory: Positive for shortness of breath. Negative for cough and wheezing. Reports SOB with exertion    Cardiovascular: Negative for chest pain, palpitations and leg swelling.         Denies chest pain    Gastrointestinal: Negative for abdominal distention, abdominal pain, blood in stool, constipation, diarrhea, nausea and vomiting. Endocrine: Negative for cold intolerance and heat intolerance. Genitourinary: Negative for difficulty urinating, flank pain, frequency and urgency. Musculoskeletal: Negative for arthralgias and myalgias. Neurological: Positive for dizziness and light-headedness. Negative for syncope, weakness, numbness and headaches. Hematological: Does not bruise/bleed easily. Psychiatric/Behavioral: Negative for agitation, confusion and dysphoric mood. Objective:   VITALS:  /66   Pulse 50   Temp 97.6 °F (36.4 °C) (Temporal)   Resp 16   Ht 5' (1.524 m)   Wt 156 lb (70.8 kg)   SpO2 92%   BMI 30.47 kg/m²   24HR INTAKE/OUTPUT:      Intake/Output Summary (Last 24 hours) at 5/7/2021 1733  Last data filed at 5/7/2021 1243  Gross per 24 hour   Intake 240 ml   Output 1000 ml   Net -760 ml       Physical Exam  Constitutional:       General: She is not in acute distress. Appearance: Normal appearance. She is not toxic-appearing or diaphoretic. HENT:      Head: Normocephalic and atraumatic. Right Ear: External ear normal.      Left Ear: External ear normal.      Nose: Nose normal. No congestion or rhinorrhea. Mouth/Throat:      Mouth: Mucous membranes are moist.      Pharynx: Oropharynx is clear. Eyes:      General: No scleral icterus. Extraocular Movements: Extraocular movements intact. Conjunctiva/sclera: Conjunctivae normal.   Neck:      Musculoskeletal: Normal range of motion and neck supple. Cardiovascular:      Rate and Rhythm: Regular rhythm. Bradycardia present. Pulses: Normal pulses. Heart sounds: Normal heart sounds. No murmur. No friction rub. No gallop. Pulmonary:      Effort: Pulmonary effort is normal. No respiratory distress. Breath sounds: Normal breath sounds. No wheezing, rhonchi or rales. Abdominal:      General: Abdomen is flat.  Bowel sounds are normal. There is no distension. Palpations: Abdomen is soft. Tenderness: There is no abdominal tenderness. Musculoskeletal: Normal range of motion. General: No swelling. Right lower leg: No edema. Left lower leg: No edema. Skin:     General: Skin is warm and dry. Coloration: Skin is not jaundiced. Findings: No erythema, lesion or rash. Neurological:      General: No focal deficit present. Mental Status: She is alert and oriented to person, place, and time. Mental status is at baseline. Cranial Nerves: No cranial nerve deficit. Sensory: No sensory deficit. Motor: No weakness. Psychiatric:         Mood and Affect: Mood normal.         Behavior: Behavior normal.         Thought Content: Thought content normal.         Judgment: Judgment normal.            Medications:      sodium chloride        valsartan  40 mg Oral BID    escitalopram  20 mg Oral Daily    pantoprazole  40 mg Oral QAM AC    sodium chloride flush  5-40 mL Intravenous 2 times per day    enoxaparin  40 mg Subcutaneous Daily     sodium chloride flush, sodium chloride, promethazine **OR** ondansetron, polyethylene glycol, acetaminophen **OR** acetaminophen, potassium chloride **OR** potassium alternative oral replacement **OR** potassium chloride, magnesium sulfate  DIET GENERAL;     Lab and other Data:     Recent Labs     05/06/21  1711 05/07/21  0608   WBC 7.0 6.2   HGB 13.0 12.5    215     Recent Labs     05/06/21  1708 05/07/21  0608    140   K 4.4 4.2    107   CO2 25 29   BUN 19 17   CREATININE 0.8 1.0*   GLUCOSE 88 85     Recent Labs     05/06/21  1708   AST 29   ALT 29   BILITOT 0.5   ALKPHOS 156*     Troponin T:   Recent Labs     05/06/21  1708 05/06/21  2342 05/07/21  0608   TROPONINI <0.01 <0.01 <0.01     INR:   Recent Labs     05/06/21  1632   INR 0.98       RAD:     Xr Chest (2 Vw)  Result Date: 5/6/2021    1. No focal infiltrate or effusion.  2. Stable mild bronchial wall thickening. Signed by Dr Radha Levin on 5/6/2021 5:01 PM      Ct Head Wo Contrast  Result Date: 5/6/2021    Impression: 1. No CT evidence of an acute intracranial process. Signed by Dr Shawna Liz on 5/6/2021 6:28 PM      Cta Pulmonary W Contrast   Result Date: 5/6/2021    Impression: 1. No CT angiographic evidence of pulmonary enema circulation aortic pathology. 2. Cardiomegaly. 3. Chronic lung changes without parenchymal infiltrates. 4. Right upper lobe 5 mm pulmonary nodule, follow-up recommended 5. Cholelithiasis. 6. Atrophic left kidney. 7.. Prominent adrenal glands, hyperplasia versus adenomatous change considered. Signed by Dr Shawna Liz on 5/6/2021 6:34 PM      Echo:  Results pending     Micro:    Rapid COVID- negative       Assessment/Plan   Principal Problem:    Symptomatic bradycardia  Active Problems:    Essential hypertension    Hyperlipidemia  Resolved Problems:    * No resolved hospital problems. *    Principal Problem:    Symptomatic bradycardia-   - Stop beta blocker    - Cardiology following    - monitor on telemetry     Active Problems:    Essential hypertension- hypotensive at OSH, stable, continue Valsartan, monitor        Paroxysmal atrial fibrillation- monitor rhythm, not on anticoagulation with known history of epistaxis        GERD- continue PPI    Pulmonary consult made for RUL 5mm Pulmonary nodule. DVT Prophylaxis: on Lovenox     GI prophylaxis:  On Protonix       Further Orders per Clinical course/attending.      Electronically signed by NEO Nice CNP on 5/7/2021 at 5:33 PM       Attestation Statement     I have independently seen and examined this patient and agree with the asesment and plan by mid level [facility] MEDICINE  - PROGRESS NOTE       Objective:   Vitals: BP (!) 156/61   Pulse 51   Temp 98.4 °F (36.9 °C) (Temporal)   Resp 17   Ht 5' (1.524 m)

## 2021-05-07 NOTE — DISCHARGE INSTR - COC
Continuity of Care Form    Patient Name: Alexandria Casiano   :    MRN:  [id number]    Admit date:  2021  Discharge date:  ***    Code Status Order: No Order   Advance Directives:     Admitting Physician:  Maico Baker MD  PCP: No primary care provider on file. Discharging Nurse: [facility] Unit/Room#: 56/5-12  Discharging Unit Phone Number: ***    Emergency Contact:   Extended Emergency Contact Information  Primary Emergency Contact: Annette Fernandez   [address] Phone: [phone]  Mobile Phone: [phone]  Relation: Child  Secondary Emergency Contact: Barron Barksdale Mobile Phone: [phone]  Relation: Child  Preferred language: English   needed?  No    Past Surgical History:  Past Surgical History:   Procedure Laterality Date    ANKLE SURGERY Left     3 surgeries    APPENDECTOMY      BLADDER SURGERY      x 2    CARPAL TUNNEL RELEASE Right     COLONOSCOPY  2019    Dr Filomena Yoo Co-Retained stool, TVA, (-) dysplasia x 1, HP x 1, 3 yr recall    HERNIA REPAIR      HYSTERECTOMY      MASTECTOMY, BILATERAL Bilateral     2010 had implants redone    SINUS SURGERY      UPPER GASTROINTESTINAL ENDOSCOPY  2019    Dr Filomena Yoo Co-Duodenitis, gastric antral ulcer, gastritis, repeat in 3 months    UPPER GASTROINTESTINAL ENDOSCOPY N/A 2020    Dr Jada Alcantar-Esophagitis, gastritis, well healed gastric ulceration        Immunization History:   Immunization History   Administered Date(s) Administered    Influenza, High Dose (Fluzone 65 yrs and older) 10/20/2018    Pneumococcal Polysaccharide (Aovnutxwi42) 08/15/2019    Zoster Recombinant (Shingrix) 10/02/2018       Active Problems:  Patient Active Problem List   Diagnosis Code    New onset a-fib (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 .    Infection Onset Last Indicated Last Received Source    MRSA [facility]      Patient Infection Status     None to display          Nurse Assessment:  Last Vital Signs: BP (!) 177/63   Pulse 60   Temp 98.2 °F (36.8 °C) (Oral)   Resp 20   Ht 5' (1.524 m)   Wt 158 lb (71.7 kg)   SpO2 91%   BMI 30.86 kg/m²     Last documented pain score (0-10 scale):    Last Weight:   Wt Readings from Last 1 Encounters:   21 158 lb (71.7 kg)     Mental Status:  {IP PT MENTAL STATUS:}    IV Access:  { JULIANA IV ACCESS:877716211}    Nursing Mobility/ADLs:  Walking   {P DME YRK}  Transfer  {[facility] DME HVMB:476090476}  Bathing  {P DME MTSC:334413625}  Dressing  {P DME AVNH:260469323}  Toileting  {P DME AAQH:054892530}  Feeding  {[facility] DME QLJU:721332830}  Med Admin  {[facility] DME VGRV:049165473}  Med Delivery   { JULIANA MED Delivery:365001009}    Wound Care Documentation and Therapy:        Elimination:  Continence:   · Bowel: {YES / VQ:94807}  · Bladder: {YES / UW:58321}  Urinary Catheter: {Urinary Catheter:030027627}   Colostomy/Ileostomy/Ileal Conduit: {YES / CA:74580}       Date of Last BM: ***  No intake or output data in the 24 hours ending 21  No intake/output data recorded.     Safety Concerns:     [address] Safety Concerns:580188687}    Impairments/Disabilities:      [address] Impairments/Disabilities:073101938}    Nutrition Therapy:  Current Nutrition Therapy:   [address] Diet List:335338858}    Routes of Feeding: {[facility] DME Other Feedings:291057414}  Liquids: {Slp liquid thickness:81349}  Daily Fluid Restriction: {CHP DME Yes amt ERPYBEI:483900248}  Last Modified Barium Swallow with Video (Video Swallowing Test): {Done Not Done UJMI:782234148}    Treatments at the Time of Hospital Discharge:   Respiratory Treatments: ***  Oxygen Therapy:  {Therapy; copd oxygen:54296}  Ventilator:    {[facility] Vent XFDN:451493347}    Rehab Therapies: {THERAPEUTIC INTERVENTION:1197995909}  Weight Bearing Status/Restrictions: {[facility] Weight Bearin}  Other Medical Equipment (for information only, NOT a DME order):  {EQUIPMENT:898779615}  Other Treatments: ***    Patient's personal belongings (please select all that are sent with patient):  {[facility] DME Belongings:602381656}    RN SIGNATURE:  {Esignature:753283553}    CASE MANAGEMENT/SOCIAL WORK SECTION    Inpatient Status Date: ***    Readmission Risk Assessment Score:  Readmission Risk              Risk of Unplanned Readmission:        0           Discharging to Facility/ Agency   · Name:   · Address:  · Phone:  · Fax:    Dialysis Facility (if applicable)   · Name:  · Address:  · Dialysis Schedule:  · Phone:  · Fax:    / signature: {Esignature:315882520}    PHYSICIAN SECTION    Prognosis: {Prognosis:2598871230}    Condition at Discharge: [address] [place] [address] Patient Condition:702222235}    Rehab Potential (if transferring to Rehab): {Prognosis:1154269809}    Recommended Labs or Other Treatments After Discharge: ***    Physician Certification: I certify the above information and transfer of Vickie Carranza  is necessary for the continuing treatment of the diagnosis listed and that she requires {Admit to Appropriate Level of Care:58266} for {GREATER/LESS:045559944} 30 days.      Update Admission H&P: {CHP DME Changes in YDBKL:833587839}    PHYSICIAN SIGNATURE:  {Esignature:420399524}

## 2021-05-07 NOTE — H&P
ulceration      Home Medications:  Prior to Admission medications    Medication Sig Start Date End Date Taking? Authorizing Provider   valsartan (DIOVAN) 40 MG tablet Take 1 tablet by mouth 2 times daily 3/17/21   Lorrie Moreno MD   metoprolol succinate (TOPROL XL) 50 MG extended release tablet Take 1 tablet by mouth daily 21   Annie Najjar, APRN - NP   omeprazole 20 MG EC tablet Take 20 mg by mouth daily    Historical Provider, MD   pantoprazole (PROTONIX) 40 MG tablet Take 20 mg by mouth daily  19   Historical Provider, MD   Cholecalciferol (VITAMIN D3) 2000 units CAPS Take 2,000 Units by mouth    Historical Provider, MD   ondansetron (ZOFRAN ODT) 4 MG disintegrating tablet Take 1 tablet by mouth every 8 hours as needed for Nausea Take one prior to beginning colon prep 10/14/19   NEO Stallings NP   oxybutynin (DITROPAN-XL) 10 MG extended release tablet Take 10 mg by mouth as needed    Historical Provider, MD   escitalopram (LEXAPRO) 20 MG tablet Take 20 mg by mouth daily    Historical Provider, MD     Allergies:    Patient has no known allergies. Social History:    The patient currently lives at home  Tobacco:   reports that she quit smoking about 33 years ago. Her smoking use included cigarettes. She has a 24.00 pack-year smoking history. She has never used smokeless tobacco.  Alcohol:   reports current alcohol use.   Illicit Drugs: denies    Family History:      Problem Relation Age of Onset    Hypertension Father     Stroke Father          age 80    Brain Cancer Mother     Colon Cancer Neg Hx     Colon Polyps Neg Hx      Review of Systems:   Constitutional / general: Denies fever / chills / sweats  Head:  Denies headache / neck stiffness / trauma / visual change  Eyes:  Denies blurry vision / acute visual change or loss / itching / redness  ENT: Denies sore throat / hoarseness / nasal drainage / ear pain  CV:  Denies chest pain / palpitations/ orthopnea   Respiratory:  Denies cough / +shortness of breath / sputum / hemoptysis  GI: Denies nausea / vomiting / abdominal pain / diarrhea / constipation  :  Denies dysuria / hesitancy / urgency / hematuria   Neuro: Denies paralysis / syncope / seizure / dysphagia / headache / paresthesias +lightheadedness  Musculoskeletal:  + muscle weakness /Denies joint stiffness / pain  Vascular: Denies edema / claudication / varicosities  Heme / endocrine: Denies easy bruising / bleeding / excessive sweating / heat or cold intolerance  Psychiatric:  Denies depression / anxiety / insomnia / mood changes  Skin:  Denies new rashes / lesions / skin hair or nail changes    14 point review of systems is negative except as specifically addressed above. Physical Examination:  BP (!) 177/63   Pulse 60   Temp 98.2 °F (36.8 °C) (Oral)   Resp 20   Ht 5' (1.524 m)   Wt 158 lb (71.7 kg)   SpO2 91%   BMI 30.86 kg/m²   General appearance: alert, appears stated age, cooperative and no distress  Head: Normocephalic, without obvious abnormality, atraumatic  Eyes: conjunctivae/corneas clear. PERRL, EOM's intact. Ears: normal external ears and nose, throat without exudate  Neck: no adenopathy, no carotid bruit, no JVD, supple, symmetrical, trachea midline   Lungs: decreased bilateral bases otherwise clear to auscultation bilaterally,no rales or wheezes   Heart: regular rate and rhythm, S1, S2 normal, no murmur  Abdomen:soft, non-tender; non-distended, normal bowel sounds no masses, no organomegaly  Extremities:No lower extremity edema,  No erythema, no tenderness to palpation  Skin: Skin color, texture, turgor normal. No rashes or lesions  Lymphatic: No palpable lymph node enlargment  Neurologic: Alert and oriented X 3, generalized weakness and normal tone.  No focal deficits   Psychiatric: Calm, pleasant, appropriate affect     Diagnostic Data:  CBC:  Recent Labs     05/06/21  1711   WBC 7.0   HGB 13.0   HCT 42.2        BMP:  Recent Labs     05/06/21  1708    K 4.4      CO2 25   BUN 19   CREATININE 0.8   CALCIUM 8.6*     Recent Labs     05/06/21  1708   AST 29   ALT 29   BILITOT 0.5   ALKPHOS 156*     Coag Panel:   Recent Labs     05/06/21  1632   INR 0.98   PROTIME 12.9   APTT 29.2     Cardiac Enzymes:   Recent Labs     05/06/21  1708   TROPONINI <0.01     Xr Chest (2 Vw)  1. No focal infiltrate or effusion. 2. Stable mild bronchial wall thickening. Signed by Dr Jen Celis on 5/6/2021 5:01 PM    Ct Head Wo Contrast  Impression: 1. No CT evidence of an acute intracranial process. Signed by Dr Mally Owusu on 5/6/2021 6:28 PM    Cta Pulmonary W Contrast  Impression: 1. No CT angiographic evidence of pulmonary enema circulation aortic pathology. 2. Cardiomegaly. 3. Chronic lung changes without parenchymal infiltrates. 4. Right upper lobe 5 mm pulmonary nodule, follow-up recommended 5. Cholelithiasis. 6. Atrophic left kidney. 7.. Prominent adrenal glands, hyperplasia versus adenomatous change considered.  Signed by Dr Mally Owusu on 5/6/2021 6:34 PM    Assessment/Plan:  Principal Problem:    Symptomatic bradycardia-stop beta blocker, Cardiology has been consulted, monitor on telemetry    Active Problems:    Essential hypertension-hypotensive at OSH, monitor       Paroxysmal atrial fibrillation-monitor rhythm, not on anticoagulation with known history of epistaxis       GERD-continue PPI    Further orders per clinical course, attending      Signed:  Jovon Costa PA-C

## 2021-05-07 NOTE — PROGRESS NOTES
4 Eyes Skin Assessment    Cris Trevino is being assessed upon: Admission    I agree that I, [facility], along with Blake Hendrickson (either 2 RN's or 1 LPN and 1 RN) have performed a thorough Head to Toe Skin Assessment on the patient. ALL assessment sites listed below have been assessed. Areas assessed by both nurses:     [x]   Head, Face, and Ears   [x]   Shoulders, Back, and Chest  [x]   Arms, Elbows, and Hands   [x]   Coccyx, Sacrum, and Ischium  [x]   Legs, Feet, and Heels    Does the Patient have Skin Breakdown?  No    Sen Prevention initiated: NA  Wound Care Orders initiated: NA    [facility] nurse consulted for Pressure Injury (Stage 3,4, Unstageable, DTI, NWPT, and Complex wounds) and New or Established Ostomies: NA        Primary Nurse eSignature: [facility], RN on 5/7/2021 at 5:04 AM      Co-Signer eSignature: Electronically signed by Veronica Watts RN on 5/11/21 at 4:26 AM CDT

## 2021-05-08 LAB
ANION GAP SERPL CALCULATED.3IONS-SCNC: 9 MMOL/L (ref 7–19)
BACTERIA: NEGATIVE /HPF
BILIRUBIN URINE: NEGATIVE
BLOOD, URINE: NEGATIVE
BUN BLDV-MCNC: 21 MG/DL (ref 8–23)
C-REACTIVE PROTEIN: 0.44 MG/DL (ref 0–0.5)
CALCIUM SERPL-MCNC: 8.7 MG/DL (ref 8.8–10.2)
CHLORIDE BLD-SCNC: 107 MMOL/L (ref 98–111)
CLARITY: ABNORMAL
CO2: 26 MMOL/L (ref 22–29)
COLOR: YELLOW
CREAT SERPL-MCNC: 0.9 MG/DL (ref 0.5–0.9)
CRYSTALS, UA: ABNORMAL /HPF
EKG P AXIS: 59 DEGREES
EKG P-R INTERVAL: 166 MS
EKG Q-T INTERVAL: 474 MS
EKG QRS DURATION: 88 MS
EKG QTC CALCULATION (BAZETT): 450 MS
EKG T AXIS: 54 DEGREES
EPITHELIAL CELLS, UA: 1 /HPF (ref 0–5)
GFR AFRICAN AMERICAN: >59
GFR NON-AFRICAN AMERICAN: >60
GLUCOSE BLD-MCNC: 105 MG/DL (ref 74–109)
GLUCOSE URINE: NEGATIVE MG/DL
HCT VFR BLD CALC: 37.6 % (ref 37–47)
HEMOGLOBIN: 11.9 G/DL (ref 12–16)
HYALINE CASTS: 1 /HPF (ref 0–8)
KETONES, URINE: NEGATIVE MG/DL
LEUKOCYTE ESTERASE, URINE: ABNORMAL
MAGNESIUM: 2 MG/DL (ref 1.6–2.4)
MCH RBC QN AUTO: 29.6 PG (ref 27–31)
MCHC RBC AUTO-ENTMCNC: 31.6 G/DL (ref 33–37)
MCV RBC AUTO: 93.5 FL (ref 81–99)
NITRITE, URINE: NEGATIVE
PDW BLD-RTO: 13.8 % (ref 11.5–14.5)
PH UA: 5.5 (ref 5–8)
PLATELET # BLD: 200 K/UL (ref 130–400)
PMV BLD AUTO: 10.7 FL (ref 9.4–12.3)
POTASSIUM REFLEX MAGNESIUM: 4.7 MMOL/L (ref 3.5–5)
PROTEIN UA: NEGATIVE MG/DL
RBC # BLD: 4.02 M/UL (ref 4.2–5.4)
RBC UA: 1 /HPF (ref 0–4)
SEDIMENTATION RATE, ERYTHROCYTE: 36 MM/HR (ref 0–25)
SODIUM BLD-SCNC: 142 MMOL/L (ref 136–145)
SPECIFIC GRAVITY UA: 1.02 (ref 1–1.03)
TSH SERPL DL<=0.05 MIU/L-ACNC: 3.08 UIU/ML (ref 0.27–4.2)
UROBILINOGEN, URINE: 1 E.U./DL
VITAMIN D 25-HYDROXY: 30.6 NG/ML
WBC # BLD: 6.8 K/UL (ref 4.8–10.8)
WBC UA: 28 /HPF (ref 0–5)

## 2021-05-08 PROCEDURE — 6370000000 HC RX 637 (ALT 250 FOR IP): Performed by: PHYSICIAN ASSISTANT

## 2021-05-08 PROCEDURE — 85027 COMPLETE CBC AUTOMATED: CPT

## 2021-05-08 PROCEDURE — 99222 1ST HOSP IP/OBS MODERATE 55: CPT | Performed by: INTERNAL MEDICINE

## 2021-05-08 PROCEDURE — 36415 COLL VENOUS BLD VENIPUNCTURE: CPT

## 2021-05-08 PROCEDURE — 2140000000 HC CCU INTERMEDIATE R&B

## 2021-05-08 PROCEDURE — 86140 C-REACTIVE PROTEIN: CPT

## 2021-05-08 PROCEDURE — 6370000000 HC RX 637 (ALT 250 FOR IP): Performed by: HOSPITALIST

## 2021-05-08 PROCEDURE — 87086 URINE CULTURE/COLONY COUNT: CPT

## 2021-05-08 PROCEDURE — 2580000003 HC RX 258: Performed by: PHYSICIAN ASSISTANT

## 2021-05-08 PROCEDURE — 2580000003 HC RX 258: Performed by: HOSPITALIST

## 2021-05-08 PROCEDURE — 80048 BASIC METABOLIC PNL TOTAL CA: CPT

## 2021-05-08 PROCEDURE — 6360000002 HC RX W HCPCS: Performed by: PHYSICIAN ASSISTANT

## 2021-05-08 PROCEDURE — 84443 ASSAY THYROID STIM HORMONE: CPT

## 2021-05-08 PROCEDURE — 81001 URINALYSIS AUTO W/SCOPE: CPT

## 2021-05-08 PROCEDURE — 93010 ELECTROCARDIOGRAM REPORT: CPT | Performed by: INTERNAL MEDICINE

## 2021-05-08 PROCEDURE — 96372 THER/PROPH/DIAG INJ SC/IM: CPT

## 2021-05-08 PROCEDURE — 82306 VITAMIN D 25 HYDROXY: CPT

## 2021-05-08 PROCEDURE — 85652 RBC SED RATE AUTOMATED: CPT

## 2021-05-08 PROCEDURE — 83735 ASSAY OF MAGNESIUM: CPT

## 2021-05-08 PROCEDURE — 6360000002 HC RX W HCPCS: Performed by: HOSPITALIST

## 2021-05-08 RX ORDER — DIAZEPAM 5 MG/1
5 TABLET ORAL EVERY 6 HOURS PRN
Status: DISCONTINUED | OUTPATIENT
Start: 2021-05-08 | End: 2021-05-11 | Stop reason: HOSPADM

## 2021-05-08 RX ADMIN — SODIUM CHLORIDE, PRESERVATIVE FREE 10 ML: 5 INJECTION INTRAVENOUS at 09:07

## 2021-05-08 RX ADMIN — CEFTRIAXONE 1000 MG: 1 INJECTION, POWDER, FOR SOLUTION INTRAMUSCULAR; INTRAVENOUS at 20:01

## 2021-05-08 RX ADMIN — PANTOPRAZOLE SODIUM 40 MG: 40 TABLET, DELAYED RELEASE ORAL at 09:07

## 2021-05-08 RX ADMIN — VALSARTAN 40 MG: 40 TABLET ORAL at 09:07

## 2021-05-08 RX ADMIN — SODIUM CHLORIDE, PRESERVATIVE FREE 10 ML: 5 INJECTION INTRAVENOUS at 20:02

## 2021-05-08 RX ADMIN — ENOXAPARIN SODIUM 40 MG: 40 INJECTION SUBCUTANEOUS at 09:07

## 2021-05-08 RX ADMIN — ESCITALOPRAM OXALATE 20 MG: 10 TABLET ORAL at 09:07

## 2021-05-08 RX ADMIN — VALSARTAN 40 MG: 40 TABLET ORAL at 20:02

## 2021-05-08 ASSESSMENT — ENCOUNTER SYMPTOMS
SINUS PAIN: 0
DIARRHEA: 0
SHORTNESS OF BREATH: 1
NAUSEA: 0
COUGH: 0
SORE THROAT: 0
ABDOMINAL DISTENTION: 0
TROUBLE SWALLOWING: 0
CONSTIPATION: 0
BLOOD IN STOOL: 0
ABDOMINAL PAIN: 0
WHEEZING: 0
VOMITING: 0

## 2021-05-08 NOTE — PROGRESS NOTES
Physical Therapy  Pt states she tried to get up to BR today and became dizzy and continues to be dizzy even in bed. Discussed with RN and will hold PT today.   Electronically signed by Kailey Cornell PT on 5/8/2021 at 1:23 PM

## 2021-05-08 NOTE — PROGRESS NOTES
[facility]      [facility]    Patient:  Catalino Moss  YOB: 1945  Date of Service: 5/8/2021  MRN: [id number]   Acct: [de-identified]   Primary Care Physician: No primary care provider on file. Advance Directive: Full Code  Admit Date: 5/6/2021       Hospital Day: 1     Portions of this note have been copied forward, however, updated to reflect the most current clinical status of this patient. CHIEF COMPLAINT dizziness     SUBJECTIVE:  Ms. Tracy Villalba was resting comfortably in bed this afternoon. She continues to report being dizzy while at rest. States shortness of breath with exertion has been improving. Denies chest pain at this time. CUMULATIVE HOSPITAL COURSE:   The patient is a 76 y.o. female with PMH paroxysmal atrial fibrillation, depression, HTN, HLD, neurofibromatosis who presented to an outside clinic complaining of dyspnea and lightheadedness. She states these symptoms have been worsening for the last several months but became more severe over the last 3-4 days. Lightheadedness and dyspnea have limited her Physical activity. States symptoms are worse with exertion, improved with rest. On day of admission, she presented to a clinic and was found to be bradycardic and hypotensive. She received 0.5 mg Atropine and EMS was called to bring her to this facility. She takes Toprol XL. Does not take anticoagulation and has history of Epistaxis from baby Aspirin. She was admitted to hospital medicine with symptomatic bradycardia. Cardiology recommends stopping metoprolol and continue to monitor. Pulmonology was consulted for right upper lobe 5 mm nodule. At this time recommends a follow-up CT of chest in 6 months and follow-up in office after the CT. Review of Systems   Constitutional: Negative for chills, diaphoresis, fatigue and fever. HENT: Negative for congestion, ear pain, sinus pain, sore throat and trouble swallowing. Eyes: Negative for visual disturbance. Pulses: Normal pulses. Heart sounds: Normal heart sounds. No murmur. No friction rub. No gallop. Pulmonary:      Effort: Pulmonary effort is normal. No respiratory distress. Breath sounds: Normal breath sounds. No wheezing, rhonchi or rales. Abdominal:      General: Abdomen is flat. Bowel sounds are normal. There is no distension. Palpations: Abdomen is soft. Tenderness: There is no abdominal tenderness. Musculoskeletal: Normal range of motion. General: No swelling. Right lower leg: No edema. Left lower leg: No edema. Skin:     General: Skin is warm and dry. Coloration: Skin is not jaundiced. Findings: No erythema, lesion or rash. Neurological:      General: No focal deficit present. Mental Status: She is alert and oriented to person, place, and time. Mental status is at baseline. Cranial Nerves: No cranial nerve deficit. Sensory: No sensory deficit. Motor: No weakness. Psychiatric:         Mood and Affect: Mood normal.         Behavior: Behavior normal.         Thought Content:  Thought content normal.         Judgment: Judgment normal.            Medications:      sodium chloride        valsartan  40 mg Oral BID    escitalopram  20 mg Oral Daily    pantoprazole  40 mg Oral QAM AC    sodium chloride flush  5-40 mL Intravenous 2 times per day    enoxaparin  40 mg Subcutaneous Daily     diazePAM, sodium chloride flush, sodium chloride, promethazine **OR** ondansetron, polyethylene glycol, acetaminophen **OR** acetaminophen, potassium chloride **OR** potassium alternative oral replacement **OR** potassium chloride, magnesium sulfate  DIET GENERAL;     Lab and other Data:     Recent Labs     05/06/21  1711 05/07/21  0608 05/08/21  0158   WBC 7.0 6.2 6.8   HGB 13.0 12.5 11.9*    215 200     Recent Labs     05/06/21  1708 05/07/21  0608 05/08/21  0158    140 142   K 4.4 4.2 4.7    107 107   CO2 25 29 26   BUN 19 17 21 CREATININE 0.8 1.0* 0.9   GLUCOSE 88 85 105     Recent Labs     05/06/21  1708   AST 29   ALT 29   BILITOT 0.5   ALKPHOS 156*     Troponin T:   Recent Labs     05/06/21  1708 05/06/21  2342 05/07/21  0608   TROPONINI <0.01 <0.01 <0.01     INR:   Recent Labs     05/06/21  1632   INR 0.98       RAD:     Xr Chest (2 Vw)  Result Date: 5/6/2021    1. No focal infiltrate or effusion. 2. Stable mild bronchial wall thickening. Signed by Dr Kirill Gomez on 5/6/2021 5:01 PM      Ct Head Wo Contrast  Result Date: 5/6/2021    Impression: 1. No CT evidence of an acute intracranial process. Signed by Dr Leonardo Mckenzie on 5/6/2021 6:28 PM      Cta Pulmonary W Contrast   Result Date: 5/6/2021    Impression: 1. No CT angiographic evidence of pulmonary enema circulation aortic pathology. 2. Cardiomegaly. 3. Chronic lung changes without parenchymal infiltrates. 4. Right upper lobe 5 mm pulmonary nodule, follow-up recommended 5. Cholelithiasis. 6. Atrophic left kidney. 7.. Prominent adrenal glands, hyperplasia versus adenomatous change considered. Signed by Dr Leonardo Mckenzie on 5/6/2021 6:34 PM      Echo:  Results pending     Micro:    Rapid COVID- negative       Assessment/Plan   Principal Problem:    Symptomatic bradycardia  Active Problems:    Essential hypertension    Hyperlipidemia  Resolved Problems:    * No resolved hospital problems. *    Principal Problem:    Symptomatic bradycardia-   - Stop beta blocker    - Cardiology following    -Continue to monitor on telemetry     Active Problems:    Essential hypertension- hypotensive at OSH, stable, continue Valsartan, elevated at times, monitor        Paroxysmal atrial fibrillation- monitor rhythm, not on anticoagulation with known history of epistaxis        GERD- continue PPI    Pulmonary consult made for RUL 5mm Pulmonary nodule whom recommends follow-up outpatient.        DVT Prophylaxis: on Lovenox     GI prophylaxis:  On Protonix       Further Orders per Clinical course/attending. Electronically signed by NEO Bowers CNP on 5/8/2021 at 4:23 PM       Attestation Statement     I have independently seen and examined this patient and agree with the asesment and plan by [facility] level [facility] MEDICINE  - PROGRESS NOTE         Objective:   Vitals: /71   Pulse 51   Temp 97.2 °F (36.2 °C) (Temporal)   Resp 16   Ht 5' (1.524 m)   Wt 157 lb (71.2 kg)   SpO2 92%   BMI 30.66 kg/m²   General appearance: alert, appears stated age and cooperative  Skin: Skin color, texture, turgor normal.   HEENT: Head: Normocephalic, no lesions, without obvious abnormality.   Neck: no adenopathy, no carotid bruit, no JVD and supple, symmetrical, trachea midline  Lungs: clear to auscultation bilaterally  Heart: regular rate and rhythm, bradycardic  Abdomen: soft, non-tender; bowel sounds normal; no masses,  no organomegaly  Extremities: extremities normal, atraumatic, no cyanosis or edema  Lymphatic: No significant lymph node enlargement papable  Neurologic: Mental status: Alert, oriented, thought content appropriate      Assessment & Plan:    Symptomatic bradycardia- hold BB- monitor - cardiology on board  UTI- start Rocephin- follow cultures    Rivka Jason MD

## 2021-05-08 NOTE — CONSULTS
Pulmonary and Critical Care Consult Note    [facility]    MRN# [id number]    Acct# [de-identified]  5/8/2021   1:48 PM CDT    Referring Darrell Mack, *      Chief Complaint: Dizziness    Requesting physician: Dr. Ernesto Garza    Reason for consult: Lung nodule      HPI: We have been consulted to see this 76y.o. year old female born on 1945. The patient presented to the hospital due to ongoing dizziness. She does have a history of atrial fibrillation neurofibromatosis and hypertension with history of smoking. She quit smoking about 30 years ago. Prior to that she was a heavy smoker. Denies any hemoptysis denies any weight loss. As part of her work-up she underwent CT pulmonary angiogram that showed no evidence of PE however she did have a 5 mm nodule in the right upper lobe. I was asked to see her regarding the above. Cardiac work-up in progress.       Past Medical History      Past Medical History:   Diagnosis Date    Arthritis     Atrial fibrillation ([address]     Carpal tunnel syndrome     x2 right hand    Depression     History of blood clots     behind left knee    History of vein stripping     left leg    Hyperlipidemia     Hypertension     Neurofibromatosis ([address]     Polio     as a child    Tachycardia      SurgicalHistory  Past Surgical History:   Procedure Laterality Date    ANKLE SURGERY Left     3 surgeries    APPENDECTOMY      BLADDER SURGERY      x 2    CARPAL TUNNEL RELEASE Right     COLONOSCOPY  11/06/2019    Dr Kristine Saini Co-Retained stool, TVA, (-) dysplasia x 1, HP x 1, 3 yr recall    HERNIA REPAIR      HYSTERECTOMY      MASTECTOMY, BILATERAL Bilateral     2010 had implants redone    SINUS SURGERY      UPPER GASTROINTESTINAL ENDOSCOPY  11/06/2019    Dr Kristine Saini Co-Duodenitis, gastric antral ulcer, gastritis, repeat in 3 months    UPPER GASTROINTESTINAL ENDOSCOPY N/A 2/18/2020    Dr Hailee Lebron, gastritis, well healed gastric ulceration      Allergies  No Known Allergies  Medications  valsartan, 40 mg, Oral, BID    escitalopram, 20 mg, Oral, Daily    pantoprazole, 40 mg, Oral, QAM AC    sodium chloride flush, 5-40 mL, Intravenous, 2 times per day    enoxaparin, 40 mg, Subcutaneous, Daily   Social History   reports that she quit smoking about 33 years ago. Her smoking use included cigarettes. She has a 24.00 pack-year smoking history. She has never used smokeless tobacco. She reports current alcohol use. She reports that she does not use drugs. Family History  family history includes Brain Cancer in her mother; Hypertension in her father; Stroke in her father. Review of Systems:  12 point review of systems is negative except as below:  CONSTITUTIONAL:  positive for  malaise and dizziness  RESPIRATORY:  positive for  dyspnea    Physical Exam:  BP (!) 134/57   Pulse 51   Temp 97.2 °F (36.2 °C) (Temporal)   Resp 18   Ht 5' (1.524 m)   Wt 156 lb (70.8 kg)   SpO2 92%   BMI 30.47 kg/m²     Intake/Output Summary (Last 24 hours) at 5/8/2021 1348  Last data filed at 5/8/2021 1332  Gross per 24 hour   Intake 720 ml   Output 700 ml   Net 20 ml       General appearance: Alert oriented in no acute distress. HEENT: Normocephalic atraumatic. Heart: S1-S2 no murmurs. Regular rate and rhythm  Lungs: Diminished bilaterally. No wheezes or rubs. No chest wall tenderness no dullness to percussion  Abdomen: Soft nontender no organomegaly with normal bowel sounds  Extremities: No edema or clubbing or cyanosis  Neuro: Alert oriented x3. No focal deficit  Skin: No rashes. No edema.         Labs:  Recent Labs     05/06/21  1711 05/07/21  0608 05/08/21  0158   WBC 7.0 6.2 6.8   RBC 4.47 4.25 4.02*   HGB 13.0 12.5 11.9*   HCT 42.2 39.5 37.6    215 200   MCV 94.4 92.9 93.5   MCH 29.1 29.4 29.6   MCHC 30.8* 31.6* 31.6*   RDW 14.0 14.2 13.8      Recent Labs     05/06/21  1708 05/07/21  0608 05/08/21  0158   NA 140 140 142   K 4.4 4.2 4.7    107 107   CO2 25 29 26   BUN 19 17 21   CREATININE 0.8 1.0* 0.9   CALCIUM 8.6* 9.1 8.7*   GLUCOSE 88 85 105      No results for input(s): PHART, CHB2VKE, PO2ART, CHK4TUI, R0ARVHZA, BEART in the last 72 hours. Recent Labs     05/06/21  1632 05/06/21  1632 05/06/21  1708 05/06/21  1708 05/07/21  0608 05/08/21  0158   AST  --   --  29  --   --   --    ALT  --   --  29  --   --   --    ALKPHOS  --   --  156*  --   --   --    BILITOT  --   --  0.5  --   --   --    MG  --   --   --   --   --  2.0   CALCIUM  --    < > 8.6*  --  9.1 8.7*   PROBNP  --   --  397  --   --   --    TROPONINI  --   --  <0.01   < > <0.01  --    INR 0.98  --   --   --   --   --    TSH  --   --   --   --   --  3.080    < > = values in this interval not displayed. No results for input(s): BC, LABGRAM, CULTRESP, BFCX in the last 72 hours. Radiograph: Xr Chest (2 Vw)    Result Date: 5/6/2021  1. No focal infiltrate or effusion. 2. Stable mild bronchial wall thickening. Signed by Dr Elena Hanks on 5/6/2021 5:01 PM    Ct Head Wo Contrast    Result Date: 5/6/2021  Impression: 1. No CT evidence of an acute intracranial process. Signed by Dr Vel Patrick on 5/6/2021 6:28 PM    Cta Pulmonary W Contrast    Result Date: 5/6/2021  Impression: 1. No CT angiographic evidence of pulmonary enema circulation aortic pathology. 2. Cardiomegaly. 3. Chronic lung changes without parenchymal infiltrates. 4. Right upper lobe 5 mm pulmonary nodule, follow-up recommended 5. Cholelithiasis. 6. Atrophic left kidney. 7.. Prominent adrenal glands, hyperplasia versus adenomatous change considered. Signed by Dr Vel Patrick on 5/6/2021 6:34 PM       My radiograph interpretation/independent review of imaging: Reviewed by myself.   Right upper lobe peripheral 5 mm nodule    Problem list generated by [employer]:  Hospital Problems           Last Modified POA    * (Principal) Symptomatic bradycardia 5/6/2021 Yes Essential hypertension 5/6/2021 Yes    Hyperlipidemia 5/6/2021 Yes             Pulmonary Assessment/plan:    1. Right upper lobe 5 mm nodule. At this time it would be difficult to characterize the nodule due to its size. With recommend follow-up CT of the chest in 6 months to assure stability. We will see her in the office after the CT. 2. Dizziness work-up per your care. 3. DVT prophylaxis. On Lovenox. 4. History of atrial fibrillation. Per your Care. Will sign off at this time please recall if needed.          Electronically signed by Jose Martin Newton MD on 05/08/21 at 1:48 PM

## 2021-05-09 PROBLEM — N39.0 UTI (URINARY TRACT INFECTION): Status: ACTIVE | Noted: 2021-05-09

## 2021-05-09 LAB
ANION GAP SERPL CALCULATED.3IONS-SCNC: 10 MMOL/L (ref 7–19)
BUN BLDV-MCNC: 19 MG/DL (ref 8–23)
CALCIUM SERPL-MCNC: 8.5 MG/DL (ref 8.8–10.2)
CHLORIDE BLD-SCNC: 106 MMOL/L (ref 98–111)
CO2: 26 MMOL/L (ref 22–29)
CREAT SERPL-MCNC: 0.9 MG/DL (ref 0.5–0.9)
GFR AFRICAN AMERICAN: >59
GFR NON-AFRICAN AMERICAN: >60
GLUCOSE BLD-MCNC: 94 MG/DL (ref 74–109)
HCT VFR BLD CALC: 38.1 % (ref 37–47)
HEMOGLOBIN: 12.4 G/DL (ref 12–16)
MCH RBC QN AUTO: 29.5 PG (ref 27–31)
MCHC RBC AUTO-ENTMCNC: 32.5 G/DL (ref 33–37)
MCV RBC AUTO: 90.5 FL (ref 81–99)
PDW BLD-RTO: 13.6 % (ref 11.5–14.5)
PLATELET # BLD: 220 K/UL (ref 130–400)
PMV BLD AUTO: 10.6 FL (ref 9.4–12.3)
POTASSIUM REFLEX MAGNESIUM: 4.1 MMOL/L (ref 3.5–5)
RBC # BLD: 4.21 M/UL (ref 4.2–5.4)
SODIUM BLD-SCNC: 142 MMOL/L (ref 136–145)
TROPONIN: <0.01 NG/ML (ref 0–0.03)
WBC # BLD: 7 K/UL (ref 4.8–10.8)

## 2021-05-09 PROCEDURE — 2580000003 HC RX 258: Performed by: PHYSICIAN ASSISTANT

## 2021-05-09 PROCEDURE — 85027 COMPLETE CBC AUTOMATED: CPT

## 2021-05-09 PROCEDURE — 97116 GAIT TRAINING THERAPY: CPT

## 2021-05-09 PROCEDURE — 36415 COLL VENOUS BLD VENIPUNCTURE: CPT

## 2021-05-09 PROCEDURE — 6360000002 HC RX W HCPCS: Performed by: HOSPITALIST

## 2021-05-09 PROCEDURE — 6370000000 HC RX 637 (ALT 250 FOR IP): Performed by: PHYSICIAN ASSISTANT

## 2021-05-09 PROCEDURE — 97161 PT EVAL LOW COMPLEX 20 MIN: CPT

## 2021-05-09 PROCEDURE — 84484 ASSAY OF TROPONIN QUANT: CPT

## 2021-05-09 PROCEDURE — 80048 BASIC METABOLIC PNL TOTAL CA: CPT

## 2021-05-09 PROCEDURE — 6360000002 HC RX W HCPCS: Performed by: PHYSICIAN ASSISTANT

## 2021-05-09 PROCEDURE — 6370000000 HC RX 637 (ALT 250 FOR IP): Performed by: HOSPITALIST

## 2021-05-09 PROCEDURE — 2140000000 HC CCU INTERMEDIATE R&B

## 2021-05-09 PROCEDURE — 2580000003 HC RX 258: Performed by: HOSPITALIST

## 2021-05-09 RX ADMIN — CEFTRIAXONE 1000 MG: 1 INJECTION, POWDER, FOR SOLUTION INTRAMUSCULAR; INTRAVENOUS at 17:20

## 2021-05-09 RX ADMIN — ENOXAPARIN SODIUM 40 MG: 40 INJECTION SUBCUTANEOUS at 09:18

## 2021-05-09 RX ADMIN — VALSARTAN 40 MG: 40 TABLET ORAL at 21:10

## 2021-05-09 RX ADMIN — VALSARTAN 40 MG: 40 TABLET ORAL at 09:18

## 2021-05-09 RX ADMIN — PANTOPRAZOLE SODIUM 40 MG: 40 TABLET, DELAYED RELEASE ORAL at 06:40

## 2021-05-09 RX ADMIN — SODIUM CHLORIDE, PRESERVATIVE FREE 10 ML: 5 INJECTION INTRAVENOUS at 09:18

## 2021-05-09 RX ADMIN — SODIUM CHLORIDE, PRESERVATIVE FREE 10 ML: 5 INJECTION INTRAVENOUS at 21:10

## 2021-05-09 RX ADMIN — ESCITALOPRAM OXALATE 20 MG: 10 TABLET ORAL at 09:18

## 2021-05-09 NOTE — PROGRESS NOTES
pain, sinus pain, sore throat and trouble swallowing. Eyes: Negative for visual disturbance. Respiratory: Positive for shortness of breath. Negative for cough and wheezing. Reports shortness of breath with exertion is improving   Cardiovascular: Negative for chest pain, palpitations and leg swelling. Denies chest pain    Gastrointestinal: Negative for abdominal distention, abdominal pain, blood in stool, constipation, diarrhea, nausea and vomiting. Endocrine: Negative for cold intolerance and heat intolerance. Genitourinary: Negative for difficulty urinating, flank pain, frequency and urgency. Musculoskeletal: Negative for arthralgias and myalgias. Neurological: Positive for dizziness and light-headedness. Negative for syncope, weakness, numbness and headaches. Reports dizziness has improved significantly   Hematological: Does not bruise/bleed easily. Psychiatric/Behavioral: Negative for agitation, confusion and dysphoric mood. Objective:   VITALS:  /71   Pulse 51   Temp 97.2 °F (36.2 °C) (Temporal)   Resp 16   Ht 5' (1.524 m)   Wt 157 lb (71.2 kg)   SpO2 92%   BMI 30.66 kg/m²   24HR INTAKE/OUTPUT:      Intake/Output Summary (Last 24 hours) at 5/9/2021 1419  Last data filed at 5/9/2021 0901  Gross per 24 hour   Intake 240 ml   Output 1150 ml   Net -910 ml       Physical Exam  Constitutional:       General: She is not in acute distress. Appearance: Normal appearance. She is not toxic-appearing or diaphoretic. HENT:      Head: Normocephalic and atraumatic. Right Ear: External ear normal.      Left Ear: External ear normal.      Nose: Nose normal. No congestion or rhinorrhea. Mouth/Throat:      Mouth: Mucous membranes are moist.      Pharynx: Oropharynx is clear. Eyes:      General: No scleral icterus. Extraocular Movements: Extraocular movements intact.       Conjunctiva/sclera: Conjunctivae normal.   Neck:      Musculoskeletal: Normal range of motion and neck supple. Cardiovascular:      Rate and Rhythm: Regular rhythm. Bradycardia present. Pulses: Normal pulses. Heart sounds: Normal heart sounds. No murmur. No friction rub. No gallop. Pulmonary:      Effort: Pulmonary effort is normal. No respiratory distress. Breath sounds: Normal breath sounds. No wheezing, rhonchi or rales. Abdominal:      General: Abdomen is flat. Bowel sounds are normal. There is no distension. Palpations: Abdomen is soft. Tenderness: There is no abdominal tenderness. Musculoskeletal: Normal range of motion. General: No swelling. Right lower leg: No edema. Left lower leg: No edema. Skin:     General: Skin is warm and dry. Coloration: Skin is not jaundiced. Findings: No erythema, lesion or rash. Neurological:      General: No focal deficit present. Mental Status: She is alert and oriented to person, place, and time. Mental status is at baseline. Cranial Nerves: No cranial nerve deficit. Sensory: No sensory deficit. Motor: No weakness. Psychiatric:         Mood and Affect: Mood normal.         Behavior: Behavior normal.         Thought Content:  Thought content normal.         Judgment: Judgment normal.            Medications:      sodium chloride        cefTRIAXone (ROCEPHIN) IV  1,000 mg Intravenous Q24H    valsartan  40 mg Oral BID    escitalopram  20 mg Oral Daily    pantoprazole  40 mg Oral QAM AC    sodium chloride flush  5-40 mL Intravenous 2 times per day    enoxaparin  40 mg Subcutaneous Daily     diazePAM, sodium chloride flush, sodium chloride, promethazine **OR** ondansetron, polyethylene glycol, acetaminophen **OR** acetaminophen, potassium chloride **OR** potassium alternative oral replacement **OR** potassium chloride, magnesium sulfate  DIET GENERAL;     Lab and other Data:     Recent Labs     05/07/21  0608 05/08/21  0158 05/09/21  0220   WBC 6.2 6.8 7.0   HGB 12.5 11.9* 12.4    200 220     Recent Labs     05/07/21  0608 05/08/21  0158 05/09/21  0220    142 142   K 4.2 4.7 4.1    107 106   CO2 29 26 26   BUN 17 21 19   CREATININE 1.0* 0.9 0.9   GLUCOSE 85 105 94     Recent Labs     05/06/21  1708   AST 29   ALT 29   BILITOT 0.5   ALKPHOS 156*     Troponin T:   Recent Labs     05/06/21  2342 05/07/21  0608 05/09/21  0847   TROPONINI <0.01 <0.01 <0.01     INR:   Recent Labs     05/06/21  1632   INR 0.98       RAD:     ECHO 2D W/DOPPLER/COLOR/CONTRAST 5/7/2021   Summary   Mitral valve leaflets are mildly thickened with preserved leaflet   mobility. Mild mitral regurgitation is present. Mildly thickened aortic valve leaflets with preserved leaflet mobility. Tricuspid valve is structurally normal.   Mild tricuspid regurgitation with estimated RVSP of 44 mm Hg. Mildly dilated left atrium. Normal left ventricular size with preserved LV function and an estimated   ejection fraction of approximately 55-60%. Impaired relaxation compatible with diastolic dysfunction. ( reversed E/A   ratio)   Mild concentric left ventricular hypertrophy. No regional wall motion abnormalities. Xr Chest (2 Vw)  Result Date: 5/6/2021    1. No focal infiltrate or effusion. 2. Stable mild bronchial wall thickening. Signed by Dr Richard To on 5/6/2021 5:01 PM      Ct Head Wo Contrast  Result Date: 5/6/2021    Impression: 1. No CT evidence of an acute intracranial process. Signed by Dr Isiah Siemens on 5/6/2021 6:28 PM      Cta Pulmonary W Contrast   Result Date: 5/6/2021    Impression: 1. No CT angiographic evidence of pulmonary enema circulation aortic pathology. 2. Cardiomegaly. 3. Chronic lung changes without parenchymal infiltrates. 4. Right upper lobe 5 mm pulmonary nodule, follow-up recommended 5. Cholelithiasis. 6. Atrophic left kidney. 7.. Prominent adrenal glands, hyperplasia versus adenomatous change considered.  Signed by Dr Isiah Siemens on 5/6/2021 auscultation bilaterally  Heart: regular rate and rhythm, S1, S2 normal, no murmur, click, rub or gallop  Abdomen: soft, non-tender; bowel sounds normal; no masses,  no organomegaly  Extremities: extremities normal, atraumatic, no cyanosis or edema  Lymphatic: No significant lymph node enlargement papable  Neurologic: Mental status: Alert, oriented, thought content appropriate      Assessment & Plan:    Symptomatic bradycardia with burst of SVT- hold BB- monitor - cardiology on board  UTI- cont Rocephin- follow cultures       Fredy Velasco MD

## 2021-05-10 LAB
ANION GAP SERPL CALCULATED.3IONS-SCNC: 10 MMOL/L (ref 7–19)
BUN BLDV-MCNC: 17 MG/DL (ref 8–23)
CALCIUM SERPL-MCNC: 8.8 MG/DL (ref 8.8–10.2)
CHLORIDE BLD-SCNC: 106 MMOL/L (ref 98–111)
CO2: 25 MMOL/L (ref 22–29)
CREAT SERPL-MCNC: 0.8 MG/DL (ref 0.5–0.9)
GFR AFRICAN AMERICAN: >59
GFR NON-AFRICAN AMERICAN: >60
GLUCOSE BLD-MCNC: 89 MG/DL (ref 74–109)
HCT VFR BLD CALC: 38.9 % (ref 37–47)
HEMOGLOBIN: 12.6 G/DL (ref 12–16)
MCH RBC QN AUTO: 29.4 PG (ref 27–31)
MCHC RBC AUTO-ENTMCNC: 32.4 G/DL (ref 33–37)
MCV RBC AUTO: 90.7 FL (ref 81–99)
PDW BLD-RTO: 13.7 % (ref 11.5–14.5)
PLATELET # BLD: 225 K/UL (ref 130–400)
PMV BLD AUTO: 10.9 FL (ref 9.4–12.3)
POTASSIUM REFLEX MAGNESIUM: 4.4 MMOL/L (ref 3.5–5)
RBC # BLD: 4.29 M/UL (ref 4.2–5.4)
SODIUM BLD-SCNC: 141 MMOL/L (ref 136–145)
URINE CULTURE, ROUTINE: NORMAL
WBC # BLD: 7.6 K/UL (ref 4.8–10.8)

## 2021-05-10 PROCEDURE — 97530 THERAPEUTIC ACTIVITIES: CPT

## 2021-05-10 PROCEDURE — 2140000000 HC CCU INTERMEDIATE R&B

## 2021-05-10 PROCEDURE — 6360000002 HC RX W HCPCS: Performed by: PHYSICIAN ASSISTANT

## 2021-05-10 PROCEDURE — 36415 COLL VENOUS BLD VENIPUNCTURE: CPT

## 2021-05-10 PROCEDURE — 85027 COMPLETE CBC AUTOMATED: CPT

## 2021-05-10 PROCEDURE — 80048 BASIC METABOLIC PNL TOTAL CA: CPT

## 2021-05-10 PROCEDURE — 97165 OT EVAL LOW COMPLEX 30 MIN: CPT

## 2021-05-10 PROCEDURE — 97116 GAIT TRAINING THERAPY: CPT

## 2021-05-10 PROCEDURE — 6370000000 HC RX 637 (ALT 250 FOR IP): Performed by: HOSPITALIST

## 2021-05-10 PROCEDURE — 99232 SBSQ HOSP IP/OBS MODERATE 35: CPT | Performed by: INTERNAL MEDICINE

## 2021-05-10 PROCEDURE — 6370000000 HC RX 637 (ALT 250 FOR IP): Performed by: PHYSICIAN ASSISTANT

## 2021-05-10 PROCEDURE — 2580000003 HC RX 258: Performed by: PHYSICIAN ASSISTANT

## 2021-05-10 PROCEDURE — 97110 THERAPEUTIC EXERCISES: CPT

## 2021-05-10 RX ORDER — DOCUSATE SODIUM 100 MG/1
100 CAPSULE, LIQUID FILLED ORAL DAILY
Status: DISCONTINUED | OUTPATIENT
Start: 2021-05-10 | End: 2021-05-11 | Stop reason: HOSPADM

## 2021-05-10 RX ADMIN — SODIUM CHLORIDE, PRESERVATIVE FREE 10 ML: 5 INJECTION INTRAVENOUS at 09:13

## 2021-05-10 RX ADMIN — VALSARTAN 40 MG: 40 TABLET ORAL at 09:13

## 2021-05-10 RX ADMIN — VALSARTAN 40 MG: 40 TABLET ORAL at 20:02

## 2021-05-10 RX ADMIN — DOCUSATE SODIUM 100 MG: 100 CAPSULE, LIQUID FILLED ORAL at 12:02

## 2021-05-10 RX ADMIN — PANTOPRAZOLE SODIUM 40 MG: 40 TABLET, DELAYED RELEASE ORAL at 06:27

## 2021-05-10 RX ADMIN — ESCITALOPRAM OXALATE 20 MG: 10 TABLET ORAL at 09:13

## 2021-05-10 RX ADMIN — ENOXAPARIN SODIUM 40 MG: 40 INJECTION SUBCUTANEOUS at 09:13

## 2021-05-10 ASSESSMENT — ENCOUNTER SYMPTOMS
COUGH: 0
VOMITING: 0
NAUSEA: 0
SHORTNESS OF BREATH: 1
ABDOMINAL PAIN: 0
DIARRHEA: 0
SORE THROAT: 0
SINUS PAIN: 0
TROUBLE SWALLOWING: 0
WHEEZING: 0
BLOOD IN STOOL: 0
CONSTIPATION: 0
ABDOMINAL DISTENTION: 0

## 2021-05-10 NOTE — PROGRESS NOTES
[facility]      Progress Note    Patient:  Juliana Hilario  YOB: 1945  Date of Service: 5/10/2021  MRN: [id number]   Acct: [de-identified]   Primary Care Physician: No primary care provider on file. Advance Directive: Full Code  Admit Date: 5/6/2021       Hospital Day: 1     Portions of this note have been copied forward, however, updated to reflect the most current clinical status of this patient. CHIEF COMPLAINT dizziness     SUBJECTIVE:  Ms. Arleen Moran was resting comfortably in bed this morning. She reports increasing in dizziness today. States shortness of breath has improved. Denies chest pain at this time. CUMULATIVE HOSPITAL COURSE:   The patient is a 76 y.o. female with PMH paroxysmal atrial fibrillation, depression, HTN, HLD, neurofibromatosis who presented to an outside clinic complaining of dyspnea and lightheadedness. She states these symptoms have been worsening for the last several months but became more severe over the last 3-4 days. Lightheadedness and dyspnea have limited her Physical activity. States symptoms are worse with exertion, improved with rest. On day of admission, she presented to a clinic and was found to be bradycardic and hypotensive. She received 0.5 mg Atropine and EMS was called to bring her to this facility. She takes Toprol XL. Does not take anticoagulation and has history of Epistaxis from baby Aspirin. She was admitted to hospital medicine with symptomatic bradycardia. Cardiology recommends stopping metoprolol and continue to monitor. Pulmonology was consulted for right upper lobe 5 mm nodule. At this time recommends a follow-up CT of chest in 6 months and follow-up in office after the CT. UA showed moderate leukocytes, was started on Rocephin 5/8/21. Cardiology plans for heart cath tomorrow      Review of Systems   Constitutional: Negative for chills, diaphoresis, fatigue and fever.    HENT: Negative for congestion, ear pain, sinus pain, sore throat and trouble swallowing. Eyes: Negative for visual disturbance. Respiratory: Positive for shortness of breath. Negative for cough and wheezing. Continues to report shortness of breath with exertion that is improving   Cardiovascular: Negative for chest pain, palpitations and leg swelling. Denies chest pain   Gastrointestinal: Negative for abdominal distention, abdominal pain, blood in stool, constipation, diarrhea, nausea and vomiting. Endocrine: Negative for cold intolerance and heat intolerance. Genitourinary: Negative for difficulty urinating, flank pain, frequency and urgency. Musculoskeletal: Negative for arthralgias and myalgias. Neurological: Positive for dizziness and light-headedness. Negative for syncope, weakness, numbness and headaches. Reports increasing dizziness today   Hematological: Does not bruise/bleed easily. Psychiatric/Behavioral: Negative for agitation, confusion and dysphoric mood. Objective:   VITALS:  /60   Pulse 58   Temp 97.2 °F (36.2 °C)   Resp 18   Ht 5' (1.524 m)   Wt 157 lb (71.2 kg)   SpO2 93%   BMI 30.66 kg/m²   24HR INTAKE/OUTPUT:      Intake/Output Summary (Last 24 hours) at 5/10/2021 1433  Last data filed at 5/10/2021 0851  Gross per 24 hour   Intake 210 ml   Output 400 ml   Net -190 ml       Physical Exam  Constitutional:       General: She is not in acute distress. Appearance: Normal appearance. She is not toxic-appearing or diaphoretic. HENT:      Head: Normocephalic and atraumatic. Right Ear: External ear normal.      Left Ear: External ear normal.      Nose: No congestion or rhinorrhea. Left Sinus: Maxillary sinus tenderness present. Comments: Left maxillary sinuses tender to palpation     Mouth/Throat:      Mouth: Mucous membranes are moist.      Pharynx: Oropharynx is clear. Eyes:      General: No scleral icterus. Extraocular Movements: Extraocular movements intact. Conjunctiva/sclera: Conjunctivae normal.   Neck:      Musculoskeletal: Normal range of motion and neck supple. Cardiovascular:      Rate and Rhythm: Regular rhythm. Bradycardia present. Pulses: Normal pulses. Heart sounds: Normal heart sounds. No murmur. No friction rub. No gallop. Pulmonary:      Effort: Pulmonary effort is normal. No respiratory distress. Breath sounds: Normal breath sounds. No wheezing, rhonchi or rales. Abdominal:      General: Abdomen is flat. Bowel sounds are normal. There is no distension. Palpations: Abdomen is soft. Tenderness: There is no abdominal tenderness. Musculoskeletal: Normal range of motion. General: No swelling. Right lower leg: No edema. Left lower leg: No edema. Skin:     General: Skin is warm and dry. Coloration: Skin is not jaundiced. Findings: No erythema, lesion or rash. Neurological:      General: No focal deficit present. Mental Status: She is alert and oriented to person, place, and time. Mental status is at baseline. Cranial Nerves: No cranial nerve deficit. Sensory: No sensory deficit. Motor: No weakness. Psychiatric:         Mood and Affect: Mood normal.         Behavior: Behavior normal.         Thought Content:  Thought content normal.         Judgment: Judgment normal.            Medications:      sodium chloride        docusate sodium  100 mg Oral Daily    cefTRIAXone (ROCEPHIN) IV  1,000 mg Intravenous Q24H    valsartan  40 mg Oral BID    escitalopram  20 mg Oral Daily    pantoprazole  40 mg Oral QAM AC    sodium chloride flush  5-40 mL Intravenous 2 times per day    enoxaparin  40 mg Subcutaneous Daily     diazePAM, sodium chloride flush, sodium chloride, promethazine **OR** ondansetron, polyethylene glycol, acetaminophen **OR** acetaminophen, potassium chloride **OR** potassium alternative oral replacement **OR** potassium chloride, magnesium sulfate  DIET GENERAL; Lab and other Data:     Recent Labs     05/08/21  0158 05/09/21  0220 05/10/21  0159   WBC 6.8 7.0 7.6   HGB 11.9* 12.4 12.6    220 225     Recent Labs     05/08/21  0158 05/09/21  0220 05/10/21  0159    142 141   K 4.7 4.1 4.4    106 106   CO2 26 26 25   BUN 21 19 17   CREATININE 0.9 0.9 0.8   GLUCOSE 105 94 89     Troponin T:   Recent Labs     05/09/21  0847   TROPONINI <0.01       RAD:     ECHO 2D W/DOPPLER/COLOR/CONTRAST 5/7/2021   Summary   Mitral valve leaflets are mildly thickened with preserved leaflet   mobility. Mild mitral regurgitation is present. Mildly thickened aortic valve leaflets with preserved leaflet mobility. Tricuspid valve is structurally normal.   Mild tricuspid regurgitation with estimated RVSP of 44 mm Hg. Mildly dilated left atrium. Normal left ventricular size with preserved LV function and an estimated   ejection fraction of approximately 55-60%. Impaired relaxation compatible with diastolic dysfunction. ( reversed E/A   ratio)   Mild concentric left ventricular hypertrophy. No regional wall motion abnormalities. Xr Chest (2 Vw)  Result Date: 5/6/2021    1. No focal infiltrate or effusion. 2. Stable mild bronchial wall thickening. Signed by Dr Chevy Ro on 5/6/2021 5:01 PM      Ct Head Wo Contrast  Result Date: 5/6/2021    Impression: 1. No CT evidence of an acute intracranial process. Signed by Dr Darrian Croft on 5/6/2021 6:28 PM      Cta Pulmonary W Contrast   Result Date: 5/6/2021    Impression: 1. No CT angiographic evidence of pulmonary enema circulation aortic pathology. 2. Cardiomegaly. 3. Chronic lung changes without parenchymal infiltrates. 4. Right upper lobe 5 mm pulmonary nodule, follow-up recommended 5. Cholelithiasis. 6. Atrophic left kidney. 7.. Prominent adrenal glands, hyperplasia versus adenomatous change considered.  Signed by Dr Darrian Croft on 5/6/2021 6:34 PM      Echo:  Results as above    Micro:    Rapid COVID- negative   Urine culture contaminated. Assessment/Plan   Principal Problem:    Symptomatic bradycardia  Active Problems:    Essential hypertension    Hyperlipidemia    UTI (urinary tract infection)  Resolved Problems:    * No resolved hospital problems. *    Principal Problem:    Symptomatic bradycardia-   - Continue to hold beta blocker    - Cardiology following    - Continue to monitor on telemetry   - Echo showed mild mitral and tricuspid regurgitation, Preserved LV function with EF of 55-60%. Impaired relaxation compatible with diastolic dysfunction. - Cardiology plans for heart cath tomorrow       Active Problems:   UTI-    - Rocephin Day #3    - urine culture is contaminated      Essential hypertension- stable, continue Valsartan, stable, elevated at times, monitor        Paroxysmal atrial fibrillation- monitor rhythm, not on anticoagulation with known history of epistaxis        GERD- continue PPI    Pulmonary consult made for RUL 5mm Pulmonary nodule whom recommends follow-up outpatient. DVT Prophylaxis: on Lovenox     GI prophylaxis:  On Protonix       Further Orders per Clinical course/attending. Electronically signed by NEO Bowers CNP on 5/10/2021 at 2:33 PM       Attestation Statement     I have independently seen and examined this patient and agree with the asesment and plan by mid level provider                                                           [facility] MEDICINE  - PROGRESS NOTE         Objective:   Vitals: /60   Pulse 58   Temp 97.2 °F (36.2 °C)   Resp 18   Ht 5' (1.524 m)   Wt 157 lb (71.2 kg)   SpO2 93%   BMI 30.66 kg/m²   General appearance: alert, appears stated age and cooperative  Skin: Skin color, texture, turgor normal.   HEENT: Head: Normocephalic, no lesions, without obvious abnormality.   Neck: no adenopathy, no carotid bruit, no JVD, supple, symmetrical, trachea midline and thyroid not enlarged, symmetric, no tenderness/mass/nodules  Lungs: clear to auscultation bilaterally  Heart: regular rate and rhythm, S1, S2 normal, no murmur, click, rub or gallop  Abdomen: soft, non-tender; bowel sounds normal; no masses,  no organomegaly  Extremities: extremities normal, atraumatic, no cyanosis or edema  Lymphatic: No significant lymph node enlargement papable  Neurologic: Mental status: Alert, oriented, thought content appropriate      Assessment & Plan:    Symptomatic bradycardia with burst of SVT with significant dyspnea- cardiac cath am          Devin Thompson MD

## 2021-05-10 NOTE — PROGRESS NOTES
Cardiology Progress Note Martínez Matthews MD      Patient:  Autumn Ellsworth  [id number]    Patient Active Problem List    Diagnosis Date Noted    UTI (urinary tract infection) 05/09/2021     Priority: Low    Symptomatic bradycardia 05/06/2021     Priority: Low    Essential hypertension 02/27/2019     Priority: Low    New onset a-fib ([facility] [address] 02/22/2019     Priority: Low    Hyperlipidemia 08/17/2018     Priority: Low    Abnormal urination 08/17/2018     Priority: Low    Female stress incontinence 11/09/2017     Priority: Low    Prolapse of vaginal vault after hysterectomy 11/09/2017     Priority: Low    Rectocele 11/09/2017     Priority: Low    Enterocele 11/09/2017     Priority: Low    Cystocele, midline 11/09/2017     Priority: Low       Admit Date:  5/6/2021    Admission Problem List: Present on Admission:   Symptomatic bradycardia   Essential hypertension   Hyperlipidemia   UTI (urinary tract infection)      Cardiac Specific Data:  Specialty Problems        Cardiology Problems    Hyperlipidemia        New onset a-fib [facility])        Essential hypertension        * (Principal) Symptomatic bradycardia            1. Hypertension, normal LV ejection fraction echo 4/17/2019, negative stress nuclear study 4/29/2019 with poor effort tolerance. 2. Palpitations with frequent PACs and short bursts of SVT (9 beats or less) with known mild sinus bradycardia. Subjective:  Ms. Kp Pederson presents with feeling lightheadedness which has been worsening and noted to be bradycardic with heart rates in the 40s. Was apparently given atropine 0.5 mg at outside facility. EKG shows sinus bradycardia initially at 46 bpm.  She was taken off her Lopressor. She reports worsening exertional shortness of breath. Going outside, doing any routine chores elicits shortness of breath. 3 years ago by her account she was walking 3 miles. Troponins are negative. BNP normal at 397.   Echo with normal LV systolic function and grade 1 diastolic dysfunction with mild mitral regurgitation. Blood pressures appear fairly well controlled. Currently ambulating in the hallway using a walker. Objective:   /60   Pulse 58   Temp 97.2 °F (36.2 °C)   Resp 18   Ht 5' (1.524 m)   Wt 157 lb (71.2 kg)   SpO2 93%   BMI 30.66 kg/m²       Intake/Output Summary (Last 24 hours) at 5/10/2021 1244  Last data filed at 5/10/2021 0851  Gross per 24 hour   Intake 450 ml   Output 400 ml   Net 50 ml       Prior to Admission medications    Medication Sig Start Date End Date Taking?  Authorizing Provider   olmesartan-hydroCHLOROthiazide (BENICAR HCT) 20-12.5 MG per tablet Take 1 tablet by mouth daily   Yes Historical Provider, MD   valsartan (DIOVAN) 40 MG tablet Take 1 tablet by mouth 2 times daily 3/17/21  Yes Silvana Huddleston MD   metoprolol succinate (TOPROL XL) 50 MG extended release tablet Take 1 tablet by mouth daily 1/12/21  Yes NEO Canas NP   omeprazole 20 MG EC tablet Take 20 mg by mouth daily   Yes Historical Provider, MD   Cholecalciferol (VITAMIN D3) 2000 units CAPS Take 2,000 Units by mouth   Yes Historical Provider, MD   oxybutynin (DITROPAN-XL) 10 MG extended release tablet Take 10 mg by mouth as needed   Yes Historical Provider, MD   escitalopram (LEXAPRO) 20 MG tablet Take 20 mg by mouth daily   Yes Historical Provider, MD   pantoprazole (PROTONIX) 40 MG tablet Take 20 mg by mouth daily  11/8/19   Historical Provider, MD   ondansetron (ZOFRAN ODT) 4 MG disintegrating tablet Take 1 tablet by mouth every 8 hours as needed for Nausea Take one prior to beginning colon prep 10/14/19   NEO James NP        docusate sodium  100 mg Oral Daily    cefTRIAXone (ROCEPHIN) IV  1,000 mg Intravenous Q24H    valsartan  40 mg Oral BID    escitalopram  20 mg Oral Daily    pantoprazole  40 mg Oral QAM AC    sodium chloride flush  5-40 mL Intravenous 2 times per day    enoxaparin  40 mg Subcutaneous Daily       TELEMETRY: Sinus Physical Exam:      Physical Exam  Constitutional:       General: She is not in acute distress. Appearance: She is not diaphoretic. HENT:      Mouth/Throat:      Pharynx: No oropharyngeal exudate. Eyes:      General: No scleral icterus. Right eye: No discharge. Left eye: No discharge. Neck:      Thyroid: No thyromegaly. Vascular: No JVD. Cardiovascular:      Rate and Rhythm: Normal rate and regular rhythm. No extrasystoles are present. Heart sounds: Normal heart sounds, S1 normal and S2 normal. No murmur. No systolic murmur. No diastolic murmur. No friction rub. No gallop. No S3 or S4 sounds. Comments: Mild jugular venous distention noted with JVP around 7 cm  No edema  No significant systolic or diastolic murmurs noted  Pulmonary:      Effort: Pulmonary effort is normal. No respiratory distress. Breath sounds: Normal breath sounds. No wheezing or rales. Comments: No crepitations or wheezes  Chest:      Chest wall: No tenderness. Abdominal:      General: Bowel sounds are normal. There is no distension. Palpations: Abdomen is soft. There is no mass. Tenderness: There is no abdominal tenderness. There is no guarding or rebound. Hernia: No hernia is present. Comments: Soft, nontender with no palpable organomegaly   Musculoskeletal: Normal range of motion. Skin:     General: Skin is warm. Coloration: Skin is not pale. Findings: No rash. Neurological:      Mental Status: She is alert and oriented to person, place, and time. Cranial Nerves: No cranial nerve deficit.       Deep Tendon Reflexes: Reflexes normal.                 Lab Data:  CBC:   Recent Labs     05/08/21 0158 05/09/21  0220 05/10/21  0159   WBC 6.8 7.0 7.6   HGB 11.9* 12.4 12.6   HCT 37.6 38.1 38.9   MCV 93.5 90.5 90.7    220 225     BMP:   Recent Labs     05/08/21  0158 05/09/21 0220 05/10/21  0159    142 141   K 4.7 4.1 4.4    106 106   CO2 appearance of the head is likely unchanged. Impression: 1. No CT evidence of an acute intracranial process. Signed by Dr Santiago Riley on 5/6/2021 6:28 PM    Cta Pulmonary W Contrast    Result Date: 5/6/2021  CTA PULMONARY W CONTRAST 5/6/2021 4:50 PM HISTORY: Shortness of breath Comparison: Current chest radiography Technique: CT evaluation of the chest was performed with intravenous contrast. 2 mm transaxial images were obtained. Maximum intensity projection reconstruction angiographic images were generated . Two-dimensional Coronal and sagittal reconstruction images of the pulmonary arteries, aorta and great vessels were also generated. .Radiation dose equals DLP 49 mGy cm. AUTOMATED EXPOSURE CONTROL dose reduction technique was implemented Findings: The pulmonary arteries opacify with iodinated contrast without intraluminal filling defects. There is no CT angiographic evidence of pulmonary embolus. The aorta is normal in caliber, without aneurysm or dissection. There is cardiomegaly with panchamber enlargement. There is no mediastinal hilar lymphadenopathy. There are calcified subcarinal lymph nodes. There is mild aortic calcifications. Changes from bilateral breast augmentation observed. Implants are intact without rupture or implant complication. There is no axillary lymphadenopathy. There are coarse calcifications identified in the left lobe of the thyroid gland with nodular measuring nearly 1 cm. Ultrasound may further characterize. There is chronic interstitial lung changes with interstitial prominence and mild paraseptal emphysema. There is calcified granuloma. There are no areas of lung consolidation, pleural effusions or pneumothoraces. In the right upper lobe, axial image #32, there is a 5 mm subpleural pulmonary nodule. Fleischner category 2 lesion, minimal one year follow-up recommended as clinically indicated. Limited imaging upper abdomen demonstrate no acute abnormality. Atrophic left kidney identified. Adrenal glands are mildly prominent, question hyperplasia versus mild adenomatous change. Gallstones identified in the gallbladder. Degenerative changes observed in the thoracic spine with scoliotic change versus patient positioning without acute osseous abnormality. Impression: 1. No CT angiographic evidence of pulmonary enema circulation aortic pathology. 2. Cardiomegaly. 3. Chronic lung changes without parenchymal infiltrates. 4. Right upper lobe 5 mm pulmonary nodule, follow-up recommended 5. Cholelithiasis. 6. Atrophic left kidney. 7.. Prominent adrenal glands, hyperplasia versus adenomatous change considered. Signed by Dr Kandice Farias on 5/6/2021 6:34 PM    Echo 2d Wo Color Doppler Complete    Result Date: 5/8/2021  Transthoracic Echocardiography Report (TTE)  Demographics   Patient Name [facility]  Date of Study        05/07/2021               Socrates Diaz   MRN          [id number]            Gender               Female   Date of      1945        Room Number          [id number]  Birth   Age          76 year(s)   Height:      60 inches         Referring Physician  Musa Dejesus   Weight:      156 pounds        Sonographer          Sally Oglesby [facility]   BSA:         1.68 m^2          Interpreting         Nico Lopez MD                                 Physician   BMI:         30.47 kg/m^2  Procedure Type of Study   TTE procedure:ECHO 2D W/DOPPLER/COLOR/CONTRAST. Study Location: Echo Lab Technical Quality: Limited visualization due to breast augmentation. Patient Status: Inpatient Contrast Medium: Definity. Amount - 8 ml Rhythm: Within normal limits BP: 133/64 mmHg  Conclusions   Summary  Mitral valve leaflets are mildly thickened with preserved leaflet  mobility. Mild mitral regurgitation is present. Mildly thickened aortic valve leaflets with preserved leaflet mobility. Tricuspid valve is structurally normal.  Mild tricuspid regurgitation with estimated RVSP of 44 mm Hg. Mildly dilated left atrium. Normal left ventricular size with preserved LV function and an estimated  ejection fraction of approximately 55-60%. Impaired relaxation compatible with diastolic dysfunction. ( reversed E/A  ratio)  Mild concentric left ventricular hypertrophy. No regional wall motion abnormalities. Signature   ----------------------------------------------------------------  Electronically signed by Lillian Sotelo MD(Interpreting  physician) on 05/08/2021 10:02 AM  ----------------------------------------------------------------   Findings   Mitral Valve  Mitral valve leaflets are mildly thickened with preserved leaflet  mobility. Mild mitral regurgitation is present. Aortic Valve  Mildly thickened aortic valve leaflets with preserved leaflet mobility. Tricuspid Valve  Tricuspid valve is structurally normal.  Mild tricuspid regurgitation with estimated RVSP of 44 mm Hg. Pulmonic Valve  The pulmonic valve was not well visualized . Left Atrium  Mildly dilated left atrium. Left Ventricle  Normal left ventricular size with preserved LV function and an estimated  ejection fraction of approximately 55-60%. Impaired relaxation compatible with diastolic dysfunction. ( reversed E/A  ratio)  Mild concentric left ventricular hypertrophy. No regional wall motion abnormalities. Right Atrium  Normal right atrial dimension with no evidence of thrombus or mass noted. Right Ventricle  Normal right ventricular size with preserved RV function. Pericardial Effusion  No evidence of significant pericardial effusion is noted. Pleural Effusion  No evidence of pleural effusion.   M-Mode Measurements (cm)   LVIDd: 3.98 cm                       LVIDs: 2.46 cm  IVSd: 1.31 cm  LVPWd: 1.32 cm                       AO Root Dimension: 2.1 cm  % Ejection Fraction: 69 %            LA: 4 cm                                       LVOT: 2 cm  Doppler Measurements:   AV Peak Velocity:155 cm/s          MV Peak E-Wave: 96.5 cm/s  AV Peak Gradient: 9.61 mmHg        MV Peak A-Wave: 112 cm/s                                     MV E/A Ratio: 0.86 %  TR Velocity:299 cm/s               MV Peak Gradient: 3.72 mmHg  TR Gradient:35.76 mmHg  Estimated RAP:5 mmHg  RVSP:41 mmHg          Assessment and Plan:    75-year-old female patient with past medical history of hypertension, palpitations consistent with ectopic beats, short bursts of SVT less than 9 beats on a 2-week monitor, no documented atrial fibrillation, normal LV ejection fraction with negative stress nuclear study 4/2019 with poor effort tolerance, presenting with lightheadedness with noted sinus bradycardia, taken off beta-blocker with reported increase exertional shortness of breath with limited activity. 1.  No obvious evidence of cardiac wall stress, volume retention or myocardial injury. Patient however continues to complain of significant exertional symptoms without chest pain. We will proceed with right and left heart catheterization to rule out obstructive CAD or pulmonary hypertension as etiology of presentation. This will be scheduled tomorrow. 2.  Continue medical management. Currently beta-blocker being held due to presentation bradycardia. If SVT recurs this will become an issue going forward. Risks, benefits, alternatives of cardiac catheterization/PCI discussed with the patient and full informed consent obtained.   Acceptable Mallampati score  Consent for moderate conscious sedation  ASA 3      Quiana Stacy MD 5/10/2021 12:44 PM

## 2021-05-10 NOTE — PROGRESS NOTES
Occupational Therapy   Occupational Therapy Initial Assessment  Date: 5/10/2021   Patient Name: Juliana Hilario  MRN: [id number]     : 1945    Date of Service: 5/10/2021    Discharge Recommendations:          Assessment   Assessment: Will progress as tolerated  Treatment Diagnosis: Symptomatic bradycardia  Prognosis: Good  Decision Making: Low Complexity  REQUIRES OT FOLLOW UP: No           Patient Diagnosis(es): The primary encounter diagnosis was Dyspnea and respiratory abnormalities. Diagnoses of Pulmonary nodule and Bradycardia were also pertinent to this visit. has a past medical history of Arthritis, Atrial fibrillation ([address] Carpal tunnel syndrome, Depression, History of blood clots, History of vein stripping, Hyperlipidemia, Hypertension, Neurofibromatosis ([address] Polio, and Tachycardia. has a past surgical history that includes sinus surgery; Carpal tunnel release (Right); Mastectomy, bilateral (Bilateral); Hysterectomy; Appendectomy; hernia repair; Colonoscopy (2019); Upper gastrointestinal endoscopy (2019); Ankle surgery (Left); Bladder surgery; and Upper gastrointestinal endoscopy (N/A, 2020). Treatment Diagnosis: Symptomatic bradycardia      Restrictions       Subjective   General  Chart Reviewed: Yes  Patient assessed for rehabilitation services?: Yes  Family / Caregiver Present: No  Diagnosis: Symptomatic bradycardia, dizziness  Patient Currently in Pain: Denies  Vital Signs  Temp: 97.2 °F (36.2 °C)  Pulse: 58  Resp: 18  BP: 132/60  Patient Currently in Pain: Denies  Oxygen Therapy  SpO2: 93 %  O2 Device: None (Room air)  Patient Observation  Observations: Patient agrees to use RW  Social/Functional History  Social/Functional History  Lives With: Alone  ADL Assistance: Independent  Ambulation Assistance: Independent  Transfer Assistance: Independent  Active : Yes  Additional Comments: HAS A RW AT ANOTHER HOME THAT HER SON CAN BRING TO HER IF NEEDED. Objective   Vision: Within Functional Limits  Hearing: Within functional limits    Orientation  Overall Orientation Status: Within Normal Limits        ADL  Feeding: Independent  Grooming: Supervision  UE Bathing: Supervision  LE Bathing: Supervision  UE Dressing: Supervision  LE Dressing: Supervision  Toileting: Supervision           Transfers  Stand Step Transfers: Supervision  Sit to stand: Supervision     Cognition  Overall Cognitive Status: WFL                 LUE AROM (degrees)  LUE AROM : WFL  RUE AROM (degrees)  RUE AROM : WFL  LUE Strength  Gross LUE Strength: WFL  RUE Strength  Gross RUE Strength: WFL                   Plan   Plan  Times per week: OT eval only    G-Code     OutComes Score                                                  AM-PAC Score             Goals  Short term goals  Time Frame for Short term goals: OT eval only  Long term goals  Time Frame for Long term goals : OT eval only       Therapy Time   Individual Concurrent Group Co-treatment   Time In           Time Out           Minutes                   Av Drummond OT

## 2021-05-11 VITALS
TEMPERATURE: 96.9 F | SYSTOLIC BLOOD PRESSURE: 132 MMHG | BODY MASS INDEX: 30.7 KG/M2 | HEART RATE: 55 BPM | OXYGEN SATURATION: 91 % | HEIGHT: 60 IN | DIASTOLIC BLOOD PRESSURE: 68 MMHG | RESPIRATION RATE: 18 BRPM | WEIGHT: 156.38 LBS

## 2021-05-11 LAB
ANION GAP SERPL CALCULATED.3IONS-SCNC: 10 MMOL/L (ref 7–19)
BUN BLDV-MCNC: 20 MG/DL (ref 8–23)
CALCIUM SERPL-MCNC: 9.3 MG/DL (ref 8.8–10.2)
CHLORIDE BLD-SCNC: 103 MMOL/L (ref 98–111)
CO2: 26 MMOL/L (ref 22–29)
CREAT SERPL-MCNC: 1.1 MG/DL (ref 0.5–0.9)
EKG P AXIS: 64 DEGREES
EKG P-R INTERVAL: 168 MS
EKG Q-T INTERVAL: 442 MS
EKG QRS DURATION: 84 MS
EKG QTC CALCULATION (BAZETT): 434 MS
EKG T AXIS: 64 DEGREES
GFR AFRICAN AMERICAN: 58
GFR NON-AFRICAN AMERICAN: 48
GLUCOSE BLD-MCNC: 90 MG/DL (ref 74–109)
HCT VFR BLD CALC: 40.2 % (ref 37–47)
HEMOGLOBIN: 12.8 G/DL (ref 12–16)
MCH RBC QN AUTO: 29.1 PG (ref 27–31)
MCHC RBC AUTO-ENTMCNC: 31.8 G/DL (ref 33–37)
MCV RBC AUTO: 91.4 FL (ref 81–99)
PDW BLD-RTO: 13.6 % (ref 11.5–14.5)
PLATELET # BLD: 240 K/UL (ref 130–400)
PMV BLD AUTO: 11.1 FL (ref 9.4–12.3)
POTASSIUM REFLEX MAGNESIUM: 4.7 MMOL/L (ref 3.5–5)
RBC # BLD: 4.4 M/UL (ref 4.2–5.4)
SODIUM BLD-SCNC: 139 MMOL/L (ref 136–145)
WBC # BLD: 7.5 K/UL (ref 4.8–10.8)

## 2021-05-11 PROCEDURE — 6370000000 HC RX 637 (ALT 250 FOR IP): Performed by: HOSPITALIST

## 2021-05-11 PROCEDURE — 6370000000 HC RX 637 (ALT 250 FOR IP): Performed by: INTERNAL MEDICINE

## 2021-05-11 PROCEDURE — B2111ZZ FLUOROSCOPY OF MULTIPLE CORONARY ARTERIES USING LOW OSMOLAR CONTRAST: ICD-10-PCS | Performed by: INTERNAL MEDICINE

## 2021-05-11 PROCEDURE — 99152 MOD SED SAME PHYS/QHP 5/>YRS: CPT

## 2021-05-11 PROCEDURE — 85027 COMPLETE CBC AUTOMATED: CPT

## 2021-05-11 PROCEDURE — 36415 COLL VENOUS BLD VENIPUNCTURE: CPT

## 2021-05-11 PROCEDURE — 93458 L HRT ARTERY/VENTRICLE ANGIO: CPT

## 2021-05-11 PROCEDURE — 97116 GAIT TRAINING THERAPY: CPT

## 2021-05-11 PROCEDURE — 6360000002 HC RX W HCPCS

## 2021-05-11 PROCEDURE — 93246 EXT ECG>7D<15D RECORDING: CPT

## 2021-05-11 PROCEDURE — 6370000000 HC RX 637 (ALT 250 FOR IP): Performed by: PHYSICIAN ASSISTANT

## 2021-05-11 PROCEDURE — C1894 INTRO/SHEATH, NON-LASER: HCPCS

## 2021-05-11 PROCEDURE — 93005 ELECTROCARDIOGRAM TRACING: CPT | Performed by: INTERNAL MEDICINE

## 2021-05-11 PROCEDURE — 2580000003 HC RX 258: Performed by: INTERNAL MEDICINE

## 2021-05-11 PROCEDURE — 93010 ELECTROCARDIOGRAM REPORT: CPT | Performed by: INTERNAL MEDICINE

## 2021-05-11 PROCEDURE — 99152 MOD SED SAME PHYS/QHP 5/>YRS: CPT | Performed by: INTERNAL MEDICINE

## 2021-05-11 PROCEDURE — 4A023N7 MEASUREMENT OF CARDIAC SAMPLING AND PRESSURE, LEFT HEART, PERCUTANEOUS APPROACH: ICD-10-PCS | Performed by: INTERNAL MEDICINE

## 2021-05-11 PROCEDURE — 80048 BASIC METABOLIC PNL TOTAL CA: CPT

## 2021-05-11 PROCEDURE — C1769 GUIDE WIRE: HCPCS

## 2021-05-11 PROCEDURE — 6360000004 HC RX CONTRAST MEDICATION: Performed by: HOSPITALIST

## 2021-05-11 PROCEDURE — 93458 L HRT ARTERY/VENTRICLE ANGIO: CPT | Performed by: INTERNAL MEDICINE

## 2021-05-11 PROCEDURE — C1887 CATHETER, GUIDING: HCPCS

## 2021-05-11 PROCEDURE — 2709999900 HC NON-CHARGEABLE SUPPLY

## 2021-05-11 PROCEDURE — 2500000003 HC RX 250 WO HCPCS

## 2021-05-11 RX ORDER — SODIUM CHLORIDE 9 MG/ML
INJECTION, SOLUTION INTRAVENOUS CONTINUOUS
Status: DISCONTINUED | OUTPATIENT
Start: 2021-05-11 | End: 2021-05-11 | Stop reason: HOSPADM

## 2021-05-11 RX ORDER — ESCITALOPRAM OXALATE 20 MG/1
20 TABLET ORAL DAILY
Qty: 30 TABLET | Refills: 3 | Status: SHIPPED | OUTPATIENT
Start: 2021-05-11 | End: 2022-03-07 | Stop reason: SDUPTHER

## 2021-05-11 RX ORDER — ASPIRIN 325 MG
325 TABLET ORAL ONCE
Status: COMPLETED | OUTPATIENT
Start: 2021-05-11 | End: 2021-05-11

## 2021-05-11 RX ORDER — SODIUM CHLORIDE 9 MG/ML
INJECTION, SOLUTION INTRAVENOUS CONTINUOUS
Status: DISCONTINUED | OUTPATIENT
Start: 2021-05-11 | End: 2021-05-11

## 2021-05-11 RX ADMIN — DOCUSATE SODIUM 100 MG: 100 CAPSULE, LIQUID FILLED ORAL at 08:47

## 2021-05-11 RX ADMIN — ASPIRIN 325 MG: 325 TABLET, FILM COATED ORAL at 05:59

## 2021-05-11 RX ADMIN — IOPAMIDOL 102 ML: 612 INJECTION, SOLUTION INTRAVENOUS at 10:18

## 2021-05-11 RX ADMIN — ESCITALOPRAM OXALATE 20 MG: 10 TABLET ORAL at 08:47

## 2021-05-11 RX ADMIN — PANTOPRAZOLE SODIUM 40 MG: 40 TABLET, DELAYED RELEASE ORAL at 05:59

## 2021-05-11 RX ADMIN — VALSARTAN 40 MG: 40 TABLET ORAL at 08:47

## 2021-05-11 RX ADMIN — SODIUM CHLORIDE: 9 INJECTION, SOLUTION INTRAVENOUS at 10:58

## 2021-05-11 NOTE — PROGRESS NOTES
Pt does not have an IV for her antibiotic or heart cath in the morning. Several unsuccessful attempts by 2 nurses on PCU, clinical house notified and attempted x1. This nurse called ED to request an US guided IV if someone gets a free moment tonight. Otherwise, will call someone in the AM for an US guided IV. Pt tolerated well and states this is her normal and it always takes 5-10 sticks before success.  Electronically signed by Tamra Bhatt RN on 5/11/2021 at 4:54 AM

## 2021-05-11 NOTE — PROGRESS NOTES
TR band removed without complications. Site is soft, mild bruising noted, without oozing or hematoma formation. Pulses palpable, full sensation, cap refill >3 secs. Reviewed discharge instructions with patient on how to care for radial puncture site. All follow-up appts reviewed with patient and questions answered.  Electronically signed by Roderick Diaz RN on 5/11/2021 at 5:58 PM

## 2021-05-11 NOTE — DISCHARGE SUMMARY
Jose L Mauro  :  1945  MRN:  [id number]    Admit date:  2021  Discharge date:    2021    Discharging Physician:  Dr. Geoffrey Newman Directive: Full Code    Consults: cardiology    Primary Care Physician:  No primary care provider on file. Discharge Diagnoses:  Principal Problem:    Symptomatic bradycardia  Active Problems:    Essential hypertension    Hyperlipidemia    UTI (urinary tract infection)  Resolved Problems:    * No resolved hospital problems. *      Portions of this note have been copied forward, however, changed to reflect the most current clinical status of this patient. Hospital Course: The patient is a 67 y. o. female with PMH paroxysmal atrial fibrillation, depression, HTN, HLD, neurofibromatosis who presented to an outside clinic complaining of dyspnea and lightheadedness. She stated these symptoms have been worsening for the last several months but became more severe over the last 3-4 days. Lightheadedness and dyspnea have limited her Physical activity. Stated symptoms are worse with exertion, improved with rest. On day of admission, she presented to a clinic and was found to be bradycardic and hypotensive. She received 0.5 mg Atropine and EMS was called to bring her to this facility. She takes Toprol XL. Does not take anticoagulation and has history of Epistaxis from baby Aspirin. She was admitted to hospital medicine with symptomatic bradycardia. Cardiology recommended stopping metoprolol and continue to monitor. Pulmonology was consulted for right upper lobe 5 mm nodule. At this time recommends a follow-up CT of chest in 6 months and follow-up in office after the CT. UA showed moderate leukocytes, was started on Rocephin on 21. She underwent heart cath today with findings of single-vessel disease with ostial RCA 60% stenosis, cardiology recommends medical management at this time.  Metoprolol, valsartan and Benicar has ----------------------------------------------------------------  Electronically signed by Nico Lopez MD(Interpreting  physician) on 05/08/2021 10:02 AM        Pertinent Labs:   CBC:   Recent Labs     05/09/21  0220 05/10/21  0159 05/11/21  0143   WBC 7.0 7.6 7.5   HGB 12.4 12.6 12.8    225 240     BMP:    Recent Labs     05/09/21  0220 05/10/21  0159 05/11/21  0143    141 139   K 4.1 4.4 4.7    106 103   CO2 26 25 26   BUN 19 17 20   CREATININE 0.9 0.8 1.1*   GLUCOSE 94 89 90       Physical Exam:   Vital Signs: /68   Pulse 55   Temp 96.9 °F (36.1 °C) (Temporal)   Resp 18   Ht 5' (1.524 m)   Wt 156 lb 6 oz (70.9 kg)   SpO2 91%   BMI 30.54 kg/m²   General appearance:. Alert and Cooperative   HEENT: Normocephalic. Chest: Clear lung sounds bilaterally without wheezes or rhonchi. Cardiac: RRR, S1, S2 normal. No murmurs, gallops, or rubs auscultated. Abdomen: soft, non-tender; non-distended normal bowel sounds no masses, no organomegaly. Extremities: No clubbing or cyanosis. No peripheral edema. Peripheral pulses palpable. Neurologic: Grossly intact.         Discharge Medications:          Medication List      CONTINUE taking these medications    escitalopram 20 MG tablet  Commonly known as: LEXAPRO  Take 1 tablet by mouth daily     omeprazole 20 MG EC tablet     ondansetron 4 MG disintegrating tablet  Commonly known as: Zofran ODT  Take 1 tablet by mouth every 8 hours as needed for Nausea Take one prior to beginning colon prep     Vitamin D3 50 MCG (2000 UT) Caps        STOP taking these medications    Benicar HCT 20-12.5 MG per tablet  Generic drug: olmesartan-hydroCHLOROthiazide     metoprolol succinate 50 MG extended release tablet  Commonly known as: TOPROL XL     oxybutynin 10 MG extended release tablet  Commonly known as: DITROPAN-XL     pantoprazole 40 MG tablet  Commonly known as: PROTONIX     valsartan 40 MG tablet  Commonly known as: Diovan           Where to Get Your Medications      You can get these medications from any pharmacy    Bring a paper prescription for each of these medications  · escitalopram 20 MG tablet            Discharge Instructions: Follow up with No primary care provider on file. in 2 days. Follow up with Cardiology, Pulmonology, and ENT. Take medications as directed. Resume activity as tolerated. Diet: DIET CARDIAC; Disposition: Patient is medically stable and will be discharged home. Time spent on discharge 32 minutes spent in assessing patient, reviewing medications, discussion with nursing, confirming safe discharge plan and preparation of discharge summary. Signed:  Electronically signed by NEO Kraus CNP on 5/11/21 at 1:40 PM CDT       Attestation Statement     I have independently seen and examined this patient and agree with the asesment and plan by mid level provider                                               Objective:   Vitals: /68   Pulse 55   Temp 96.9 °F (36.1 °C) (Temporal)   Resp 18   Ht 5' (1.524 m)   Wt 156 lb 6 oz (70.9 kg)   SpO2 91%   BMI 30.54 kg/m²   General appearance: alert, appears stated age and cooperative  Skin: Skin color, texture, turgor normal.   HEENT: Head: Normocephalic, no lesions, without obvious abnormality.   Neck: no adenopathy, no carotid bruit, no JVD and supple, symmetrical, trachea midline  Lungs: clear to auscultation bilaterally  Heart: regular rate and rhythm, S1, S2 normal, no murmur, click, rub or gallop  Abdomen: soft, non-tender; bowel sounds normal; no masses,  no organomegaly  Extremities: extremities normal, atraumatic, no cyanosis or edema  Lymphatic: No significant lymph node enlargement papable  Neurologic: Mental status: Alert, oriented, thought content appropriate      Assessment & Plan:    Bradycardia with burst of SVT- improved with stopping BB- dc home today with zio patch  Single vessel disease with ostial RCA 60%  Right upper lobe 5 mm pulmonary nodule- follow up as OP with Dr Jose C Fallon per his orders   Cholelithiasis- follow as OP   Atrophic left kidney  Prominent adrenal glands, hyperplasia versus adenomatous change - follow as OP      Gila Alaniz MD

## 2021-05-11 NOTE — PROGRESS NOTES
Cardiac catheterization preliminary note:    Single-vessel disease with ostial RCA 60% stenosis. No catheter damping. Normal LV systolic function. Plan: Medical management. Patient remain off beta-blocker therapy at this time and monitor symptoms going forward.

## 2021-05-11 NOTE — PROGRESS NOTES
Physical Therapy  Name: Handy Bar  MRN:  [id number]  Date of service:  5/11/2021 05/11/21 1414   General   Chart Reviewed Yes   Subjective   Subjective Pt up in recliner, agreeable to work with therapy. Pain Screening   Patient Currently in Pain Denies   Bed Mobility   Sit to Supine Modified independent   Transfers   Sit to Stand Stand by assistance   Stand to sit Stand by assistance   Ambulation   Ambulation? Yes   Ambulation 1   Surface level tile   Device No Device   Assistance Stand by assistance   Quality of Gait steady No LOB   Gait Deviations Deviated path   Distance 500'   Short term goals   Time Frame for Short term goals 2 WKS   Short term goal 1  FT WITH AD AS INDICATED, SBA   Conditions Requiring Skilled Therapeutic Intervention   Body structures, Functions, Activity limitations Decreased functional mobility ; Decreased endurance   Assessment Pt able to increase amb distance in hallway with no AD, steady overall with no LOB. Pt declined back to chair, returned to supine and positioned for comfort with all needs in reach.    Activity Tolerance   Activity Tolerance Patient Tolerated treatment well   Safety Devices   Type of devices Bed alarm in place;Call light within reach;Gait belt;Left in bed         Electronically signed by Suleman Thompson PTA on 5/11/2021 at 2:19 PM

## 2021-05-12 ENCOUNTER — CARE COORDINATION (OUTPATIENT)
Dept: CASE MANAGEMENT | Age: 76
End: 2021-05-12

## 2021-05-12 DIAGNOSIS — N39.0 URINARY TRACT INFECTION WITHOUT HEMATURIA, SITE UNSPECIFIED: Primary | ICD-10-CM

## 2021-05-12 PROCEDURE — 1111F DSCHRG MED/CURRENT MED MERGE: CPT | Performed by: NURSE PRACTITIONER

## 2021-05-12 NOTE — CARE COORDINATION
Wolfgang 45 Transitions Initial Follow Up Call    Call within 2 business days of discharge: Yes    Patient: Nate Koroma Patient : 1945   MRN: [id number]  Reason for Admission: Symptomatic Bradycardia  Discharge Date: 21 RARS: Readmission Risk Score: 10      Last Discharge [address] [id number]       Complaint Diagnosis Description Type Department Provider    21 Shortness of Breath Dyspnea and respiratory abnormalities . .. ED to Hosp-Admission (Discharged) (ADMITTED) [facility] FERMÍN Vasquez MD; Renae Gama. .[place] 67      Facility:[facility]      Non-face-to-face services provided:  Reviewed encounter information for continuity of care prior to follow up Care Transitions phone call - chart notes, consults, progress notes, test results, med list, appointments, AVS, other information. Care Transitions 24 Hour Call    Schedule Follow Up Appointment with PCP: Completed  Do you have any ongoing symptoms?: No  Do you have a copy of your discharge instructions?: Yes  Do you have all of your prescriptions and are they filled?: Yes  Have you been contacted by a [address]?: No  Have you scheduled your follow up appointment?: Yes  How are you going to get to your appointment?: Car - drive self  Were you discharged with any Home Care or Post Acute Services: No  Do you feel like you have everything you need to keep you well at home?: Yes  Care Transitions Interventions         Follow Up  Future Appointments   Date Time Provider Francisco Leyva   2021  9:00 AM Talisha Olivo [address] [place] [address]-[place]   2021 11:00 AM NEO Khan Cardio [facility]   2021  1:45 PM NEO Chino Cardio [place]   2021 10:00 AM MD Paulino Juan [facility]     Placed a call to the number listed for patient and spoke with her for an initial follow up call post discharge from the hospital yesterday.   She reported that she slept well last night and is doing well this morning. She said her appetite is fair and she is drinking well. She said she is getting a little stronger. She denied any abnormal symptoms. She said she has all of her medications and is taking them as ordered. She denied having any new ones, but is aware of the ones that have been discontinued. She is aware of her hospital follow up appointments. She does not have and is not in need of home health. Discussed COVID 19 information, risks, signs, quarantine, infection control, vaccines, etc.  Patient aware and voices understanding. Informed of CTC process, purpose, future calls, etc and is agreeable with appropriate follow up. Ensured to have CTN contact information to be used as needed. Encouraged to call as needed for new issues, questions, etc.  Given the opportunity to ask questions and answered appropriately. CTN to follow up as indicated. Patient contacted regarding COVID-19 risk. Discussed COVID-19 related testing which was available at this time. Test results were negative on 2021. Patient informed of results, if available? Already aware of results. Care Transition Nurse contacted the patient by telephone to perform post discharge assessment. Call within 2 business days of discharge: Yes. Verified name and  with patient as identifiers. Provided introduction to self, and explanation of the CTN/ACM role, and reason for call due to risk factors for infection and/or exposure to COVID-19. Symptoms reviewed with patient who verbalized the following symptoms: None. Due to no new or worsening symptoms encounter was not routed to provider for escalation. Discussed follow-up appointments.      [facility] follow up appointment(s):   Future Appointments   Date Time Provider Francisco Leyva   2021  9:00 AM [facility]   2021 11:00 AM NEO Reed Cardio [place]   2021  1:45 PM NEO Hahn N LPS Cardio [facility]   11/11/2021 10:00 AM MD Taylor Duenas Phy [facility]        Advance Care Planning:   Does patient have an Advance Directive:  reviewed and current. Patient does not have a current health care decision maker listed and is not interested in naming one at this time. Educated patient about risk for severe COVID-19 due to risk factors according to CDC guidelines. CTN reviewed discharge instructions, medical action plan and red flag symptoms patient who verbalized understanding. Discussed COVID vaccination status Yes. Education provided on COVID-19 vaccination as appropriate. Discussed exposure protocols and quarantine with CDC Guidelines. Patient was given an opportunity to verbalize any questions and concerns and agrees to contact CTN or health care provider for questions related to their healthcare. CTN provided contact information. Plan for follow-up call in 3-5 days based on severity of symptoms and risk factors.     Danny Lucia RN

## 2021-05-16 VITALS
HEIGHT: 61 IN | SYSTOLIC BLOOD PRESSURE: 140 MMHG | DIASTOLIC BLOOD PRESSURE: 78 MMHG | HEART RATE: 68 BPM | WEIGHT: 145 LBS | BODY MASS INDEX: 27.38 KG/M2

## 2021-05-17 ENCOUNTER — OFFICE VISIT (OUTPATIENT)
Dept: FAMILY MEDICINE CLINIC | Age: 76
End: 2021-05-17
Payer: MEDICARE

## 2021-05-17 VITALS
WEIGHT: 156 LBS | HEART RATE: 59 BPM | SYSTOLIC BLOOD PRESSURE: 100 MMHG | OXYGEN SATURATION: 98 % | BODY MASS INDEX: 29.45 KG/M2 | RESPIRATION RATE: 20 BRPM | TEMPERATURE: 97.3 F | HEIGHT: 61 IN | DIASTOLIC BLOOD PRESSURE: 68 MMHG

## 2021-05-17 DIAGNOSIS — R91.1 PULMONARY NODULE: ICD-10-CM

## 2021-05-17 DIAGNOSIS — R00.1 SYMPTOMATIC BRADYCARDIA: Primary | ICD-10-CM

## 2021-05-17 DIAGNOSIS — N28.89 KIDNEY MASS: ICD-10-CM

## 2021-05-17 DIAGNOSIS — J32.9 CHRONIC SINUSITIS, UNSPECIFIED LOCATION: ICD-10-CM

## 2021-05-17 DIAGNOSIS — I48.91 NEW ONSET A-FIB (HCC): ICD-10-CM

## 2021-05-17 PROCEDURE — 99496 TRANSJ CARE MGMT HIGH F2F 7D: CPT | Performed by: NURSE PRACTITIONER

## 2021-05-17 PROCEDURE — 1111F DSCHRG MED/CURRENT MED MERGE: CPT | Performed by: NURSE PRACTITIONER

## 2021-05-17 ASSESSMENT — PATIENT HEALTH QUESTIONNAIRE - PHQ9
SUM OF ALL RESPONSES TO PHQ QUESTIONS 1-9: 0
SUM OF ALL RESPONSES TO PHQ QUESTIONS 1-9: 0
SUM OF ALL RESPONSES TO PHQ9 QUESTIONS 1 & 2: 0

## 2021-05-17 ASSESSMENT — ENCOUNTER SYMPTOMS
COUGH: 0
SHORTNESS OF BREATH: 1
CONSTIPATION: 0
BACK PAIN: 1
ABDOMINAL PAIN: 0

## 2021-05-17 NOTE — PROGRESS NOTES
Post-Discharge Transitional Care Management Services or Hospital Follow Up      Gabriel Weber   YOB: 1945    Date of Office Visit:  5/17/2021  Date of Hospital Admission: 5/6/21  Date of Hospital Discharge: 5/11/21  Readmission Risk Score(high >=14%.  Medium >=10%):Readmission Risk Score: 10      Care management risk score Rising risk (score 2-5) and Complex Care (Scores >=6): 1     Non face to face  following discharge, date last encounter closed (first attempt may have been earlier): 5/12/2021  9:45 AM 5/12/2021  9:45 AM    Call initiated 2 business days of discharge: Yes     Patient Active Problem List   Diagnosis    New onset a-fib ([address]    Essential hypertension    Symptomatic bradycardia    Female stress incontinence    Prolapse of vaginal vault after hysterectomy    Rectocele    Hyperlipidemia    Enterocele    Cystocele, midline    Abnormal urination    UTI (urinary tract infection)       No Known Allergies    Medications listed as ordered at the time of discharge from hospital   Eleonora Reyes   Home Medication Instructions JORDY:    Printed on:05/17/21 1311   Medication Information                      Cholecalciferol (VITAMIN D3) 2000 units CAPS  Take 2,000 Units by mouth             escitalopram (LEXAPRO) 20 MG tablet  Take 1 tablet by mouth daily             omeprazole 20 MG EC tablet  Take 20 mg by mouth daily             ondansetron (ZOFRAN ODT) 4 MG disintegrating tablet  Take 1 tablet by mouth every 8 hours as needed for Nausea Take one prior to beginning colon prep                   Medications marked \"taking\" at this time  Outpatient Medications Marked as Taking for the 5/17/21 encounter (Office Visit) with NEO Burkett   Medication Sig Dispense Refill    escitalopram (LEXAPRO) 20 MG tablet Take 1 tablet by mouth daily 30 tablet 3    omeprazole 20 MG EC tablet Take 20 mg by mouth daily      Cholecalciferol (VITAMIN D3) 2000 units CAPS Take 2,000 Units by mouth      ondansetron (ZOFRAN ODT) 4 MG disintegrating tablet Take 1 tablet by mouth every 8 hours as needed for Nausea Take one prior to beginning colon prep 5 tablet 0        Medications patient taking as of [id number] against medications ordered at time of hospital discharge: Yes    Chief Complaint   Patient presents with    Follow-Up from [address] [place] [address] Follow up [address] / blood pressure goes up and down . she still is having dizziness . has an appointment for ENT . [facility] [place]    Inpatient course: Discharge summary reviewed- see chart. Hospital Course: The patient is a 67 y. o. female with PMH paroxysmal atrial fibrillation, depression, HTN, HLD, neurofibromatosis who presented to an outside clinic complaining of dyspnea and lightheadedness. She stated these symptoms have been worsening for the last several months but became more severe over the last 3-4 days. Lightheadedness and dyspnea have limited her Physical activity. Stated symptoms are worse with exertion, improved with rest. On day of admission, she presented to a clinic and was found to be bradycardic and hypotensive. She received 0.5 mg Atropine and EMS was called to bring her to this facility. She takes Toprol XL. Does not take anticoagulation and has history of Epistaxis from baby Aspirin. She was admitted to hospital medicine with symptomatic bradycardia.  Cardiology recommended stopping metoprolol and continue to monitor.  Pulmonology was consulted for right upper lobe 5 mm nodule.  At this time recommends a follow-up CT of chest in 6 months and follow-up in office after the CT. UA showed moderate leukocytes, was started on Rocephin on 5/8/21. She underwent heart cath today with findings of single-vessel disease with ostial RCA 60% stenosis, cardiology recommends medical management at this time. Metoprolol, valsartan and Benicar has been discontinued.  Patient continued to report dizziness on an off throughout the 03/17/21 132/78       Physical Exam  Constitutional:       Appearance: Normal appearance. She is well-developed and well-groomed. HENT:      Head: Normocephalic and atraumatic. Right Ear: Tympanic membrane, ear canal and external ear normal. There is no impacted cerumen. Left Ear: Tympanic membrane, ear canal and external ear normal. There is no impacted cerumen. Nose: Nose normal.      Mouth/Throat:      Lips: Pink. Mouth: Mucous membranes are moist.      Dentition: Normal dentition. Pharynx: Oropharynx is clear. Uvula midline. Eyes:      General: Lids are normal.         Right eye: No discharge. Left eye: No discharge. Extraocular Movements: Extraocular movements intact. Conjunctiva/sclera: Conjunctivae normal.      Right eye: Right conjunctiva is not injected. Left eye: Left conjunctiva is not injected. Pupils: Pupils are equal, round, and reactive to light. Neck:      Thyroid: No thyromegaly. Vascular: No carotid bruit or JVD. Cardiovascular:      Rate and Rhythm: Normal rate and regular rhythm. Pulses: Normal pulses. Carotid pulses are 2+ on the right side and 2+ on the left side. Radial pulses are 2+ on the right side and 2+ on the left side. Heart sounds: S1 normal and S2 normal. Murmur heard. Pulmonary:      Effort: Pulmonary effort is normal.      Breath sounds: Normal breath sounds. Abdominal:      General: Bowel sounds are normal. There is no distension or abdominal bruit. Palpations: Abdomen is soft. There is no mass. Hernia: No hernia is present. Musculoskeletal:         General: Normal range of motion. Cervical back: Full passive range of motion without pain, normal range of motion and neck supple. Right lower leg: No edema. Left lower leg: No edema. Lymphadenopathy:      Cervical: No cervical adenopathy. Right cervical: No superficial cervical adenopathy.      Left cervical: No superficial cervical adenopathy. Upper Body:      Right upper body: No supraclavicular adenopathy. Left upper body: No supraclavicular adenopathy. Skin:     General: Skin is warm and dry. Coloration: Skin is not pale. Findings: No lesion or rash. Nails: There is no clubbing. Neurological:      Mental Status: She is alert and oriented to person, place, and time. Motor: No weakness or tremor. Coordination: Coordination normal.      Deep Tendon Reflexes: Reflexes are normal and symmetric. Psychiatric:         Attention and Perception: Attention normal.         Mood and Affect: Mood normal.         Speech: Speech normal.         Behavior: Behavior normal. Behavior is cooperative. Thought Content: Thought content normal.         Cognition and Memory: Cognition and memory normal.         Judgment: Judgment normal.             Assessment/Plan:   Diagnosis Orders   1. Symptomatic bradycardia  ND DISCHARGE MEDS RECONCILED W/ CURRENT OUTPATIENT MED LIST   2. Kidney mass  US RENAL COMPLETE   3. New onset a-fib (HCC)     4. Pulmonary nodule     5. Chronic sinusitis, unspecified location         RTC in 6 months   TO ER if symptoms of weakness or dizziness worsens.    Review US     Medical Decision Making: moderate complexity

## 2021-05-18 ENCOUNTER — HOSPITAL ENCOUNTER (OUTPATIENT)
Dept: GENERAL RADIOLOGY | Age: 76
Discharge: HOME | End: 2021-05-18
Payer: MEDICARE

## 2021-05-18 DIAGNOSIS — N28.89 KIDNEY MASS: ICD-10-CM

## 2021-05-18 PROCEDURE — 76770 US EXAM ABDO BACK WALL COMP: CPT

## 2021-05-19 ENCOUNTER — CARE COORDINATION (OUTPATIENT)
Dept: CASE MANAGEMENT | Age: 76
End: 2021-05-19

## 2021-05-19 NOTE — CARE COORDINATION
Wolfgang 45 Transitions Follow Up Call    2021    Patient: Jed Alexandre Patient : 1945   MRN: [id number]   Discharge Date: 21 RARS: Readmission Risk Score: 10         Spoke with: Zain Banuelos Transitions Subsequent and Final Call    Schedule Follow Up Appointment with PCP: Completed  Subsequent and Final Calls  Do you have any ongoing symptoms?: Yes  Onset of Patient-reported symptoms: In the past 7 days  Patient-reported symptoms: Other  Have your medications changed?: No  Do you have any questions related to your medications?: No  Do you currently have any active services?: No  Do you have any needs or concerns that I can assist you with?: No  Identified Barriers: None  Care Transitions Interventions  Other Interventions: Follow Up  Future Appointments   Date Time Provider Francisco Leyva   2021 11:00 AM Tammy Haque [facility] ENT [facility]   2021  1:00 PM RITA Waldron [facility] [place] [facility]   2021 11:00 AM NEO Posada [facility] Cardio [facility]   2021  1:45 PM NEO Weber [facility] Cardio [facility]   2021 10:00 AM MD Shanae Bishop [facility]     Placed a call to the patient for a follow up call. She reported that she is still having some dizziness. She said that her head and her vision feel funny when it is happening. She said her vision is not blurred or double, but just \"hard to explain. \"  She said her head feels funny, but is not really a headache. She denied palpitations, shortness of breath, nausea, sweating, other symptoms. She said her heart rate continues to run low, in the 50s most times. She said her blood pressure has been a little better. She has been off of her blood pressure medications and is going to start back on them, because her blood pressure is better, 144/74 this am.  She is eating well, but not drinking much. Said she knows she needs to drink more, but she never gets thirsty. She is still wearing the zio patch until 5/25. She said that she has not noted any of the dizzy episodes because it is happening all of the time. Told her to try to note times, etc.  She said she would try to remember to do that. She sees ENT on 6/1 and cardiology on 6/9. Has been back to see PCP. She is aware of the ongoing dizziness, etc., so I will not call to report this. No med or treatment changes reported. She is sleeping well. Tolerating activity well unless she is having dizziness. She said her son is supposed to be bringing her walker to her. She is not doing a lot out of fear of falling without it. Holding to furniture, etc.  Instructed on falling precautions, orthostatic hypotension, etc.  Denied any UTI symptoms. Has all of her meds and is taking as ordered. No other needs noted at this time.         Uma Hart RN

## 2021-05-24 DIAGNOSIS — N28.89 KIDNEY MASS: Primary | ICD-10-CM

## 2021-05-26 ENCOUNTER — CARE COORDINATION (OUTPATIENT)
Dept: CASE MANAGEMENT | Age: 76
End: 2021-05-26

## 2021-05-26 NOTE — CARE COORDINATION
Wolfgang 45 Transitions Follow Up Call    2021    Patient: Autumn Ellsworth  Patient : 1945   MRN: [id number]   Discharge Date: 21 RARS: Readmission Risk Score: 10         Spoke with: N/A    Care Transitions Subsequent and Final Call    Subsequent and Final Calls  Care Transitions Interventions  Other Interventions: Follow Up  Future Appointments   Date Time Provider [place]   2021 11:00 AM Tammy Quiroz N [facility]   2021  1:00 PM RITA Bailon [facility]   2021 11:00 AM NEO Guerra [facility] Cardio [facility]   2021  1:45 PM NEO Dillon [facility]   2021 10:00 AM MD Peyton Bowen [facility]     Attempted to make contact with patient/caregiver for a routine follow up call without success. Unable to leave a HIPAA compliant message regarding intent of call and call back information. Call went to an unidentifiable voice messaging sytem (mobile voice mail or telephone answering machine). Will try again at a later time.          Kathrin Fisher RN

## 2021-06-01 ENCOUNTER — PROCEDURE VISIT (OUTPATIENT)
Dept: ENT CLINIC | Age: 76
End: 2021-06-01
Payer: MEDICARE

## 2021-06-01 ENCOUNTER — OFFICE VISIT (OUTPATIENT)
Dept: ENT CLINIC | Age: 76
End: 2021-06-01
Payer: MEDICARE

## 2021-06-01 VITALS
DIASTOLIC BLOOD PRESSURE: 68 MMHG | WEIGHT: 160 LBS | BODY MASS INDEX: 30.21 KG/M2 | SYSTOLIC BLOOD PRESSURE: 130 MMHG | HEIGHT: 61 IN

## 2021-06-01 DIAGNOSIS — R42 VERTIGO: Primary | ICD-10-CM

## 2021-06-01 DIAGNOSIS — R42 DIZZINESS: Primary | ICD-10-CM

## 2021-06-01 PROCEDURE — 92567 TYMPANOMETRY: CPT | Performed by: AUDIOLOGIST

## 2021-06-01 PROCEDURE — 99203 OFFICE O/P NEW LOW 30 MIN: CPT | Performed by: PHYSICIAN ASSISTANT

## 2021-06-01 PROCEDURE — 92553 AUDIOMETRY AIR & BONE: CPT | Performed by: AUDIOLOGIST

## 2021-06-01 PROCEDURE — 4040F PNEUMOC VAC/ADMIN/RCVD: CPT | Performed by: PHYSICIAN ASSISTANT

## 2021-06-01 PROCEDURE — 1123F ACP DISCUSS/DSCN MKR DOCD: CPT | Performed by: PHYSICIAN ASSISTANT

## 2021-06-01 PROCEDURE — 1090F PRES/ABSN URINE INCON ASSESS: CPT | Performed by: PHYSICIAN ASSISTANT

## 2021-06-01 PROCEDURE — 1111F DSCHRG MED/CURRENT MED MERGE: CPT | Performed by: PHYSICIAN ASSISTANT

## 2021-06-01 PROCEDURE — 1036F TOBACCO NON-USER: CPT | Performed by: PHYSICIAN ASSISTANT

## 2021-06-01 PROCEDURE — G8399 PT W/DXA RESULTS DOCUMENT: HCPCS | Performed by: PHYSICIAN ASSISTANT

## 2021-06-01 PROCEDURE — G8427 DOCREV CUR MEDS BY ELIG CLIN: HCPCS | Performed by: PHYSICIAN ASSISTANT

## 2021-06-01 PROCEDURE — 3017F COLORECTAL CA SCREEN DOC REV: CPT | Performed by: PHYSICIAN ASSISTANT

## 2021-06-01 PROCEDURE — G8417 CALC BMI ABV UP PARAM F/U: HCPCS | Performed by: PHYSICIAN ASSISTANT

## 2021-06-01 RX ORDER — VALSARTAN 40 MG/1
40 TABLET ORAL DAILY
COMMUNITY
End: 2022-03-07 | Stop reason: SDUPTHER

## 2021-06-01 RX ORDER — MECLIZINE HCL 12.5 MG/1
12.5 TABLET ORAL 3 TIMES DAILY PRN
Qty: 30 TABLET | Refills: 3 | Status: SHIPPED | OUTPATIENT
Start: 2021-06-01 | End: 2021-06-11

## 2021-06-01 ASSESSMENT — ENCOUNTER SYMPTOMS
SINUS PRESSURE: 0
RHINORRHEA: 0
TROUBLE SWALLOWING: 0
EYE DISCHARGE: 0
FACIAL SWELLING: 0
EYE PAIN: 0
VOICE CHANGE: 0
SORE THROAT: 0
SINUS PAIN: 0

## 2021-06-01 NOTE — PROGRESS NOTES
No current facility-administered medications for this visit. Past Surgical History:   Procedure Laterality Date    ANKLE SURGERY Left     3 surgeries    APPENDECTOMY      BLADDER SURGERY      x 2    CARDIAC CATHETERIZATION  2021    Cx 20%. RCA 55%    CARPAL TUNNEL RELEASE Right     COLONOSCOPY  2019    Dr Clinton [facility]-Retained stool, TVA, (-) dysplasia x 1, HP x 1, 3 yr recall    HERNIA REPAIR      HYSTERECTOMY      MASTECTOMY, BILATERAL Bilateral     2010 had implants redone    SINUS SURGERY      UPPER GASTROINTESTINAL ENDOSCOPY  2019    Dr Clinton [facility]-Duodenitis, gastric antral ulcer, gastritis, repeat in 3 months    UPPER GASTROINTESTINAL ENDOSCOPY N/A 2020    Dr Jaylin Alcantar-Esophagitis, gastritis, well healed gastric ulceration        Past Medical History:   Diagnosis Date    Arthritis     Atrial fibrillation ([address]     Carpal tunnel syndrome     x2 right hand    Depression     History of blood clots     behind left knee    History of vein stripping     left leg    Hyperlipidemia     Hypertension     Neurofibromatosis ([address]     Polio     as a child    Tachycardia        Family History   Problem Relation Age of Onset    Hypertension Father     Stroke Father          age 80    Brain Cancer Mother     Colon Cancer Neg Hx     Colon Polyps Neg Hx        Social History     Tobacco Use    Smoking status: Former Smoker     Packs/day: 1.00     Years: 24.00     Pack years: 24.00     Types: Cigarettes     Quit date:      Years since quittin.4    Smokeless tobacco: Never Used   Substance Use Topics    Alcohol use: Yes     Comment: Occasional.           REVIEW OF SYSTEMS:  all other systems reviewed and are negative  Review of Systems   Constitutional: Negative for chills and fever.    HENT: Negative for congestion, dental problem, ear discharge, ear pain, facial swelling, hearing loss, nosebleeds, postnasal drip, rhinorrhea, sinus pressure, sinus pain, sneezing, sore throat, tinnitus, trouble swallowing and voice change. Eyes: Negative for pain and discharge. Neurological: Negative for dizziness, seizures, syncope and headaches. Comments:     PHYSICAL EXAM:    /68   Ht 5' 1\" (1.549 m)   Wt 160 lb (72.6 kg)   BMI 30.23 kg/m²   Body mass index is 30.23 kg/m². General Appearance: well developed  and well nourished  Head/ Face: normocephalic and atraumatic  Vocal Quality: good/ normal  Ears: Right Ear: External: external ears normal Otoscopy Ear Canal: canal clear Otoscopy TM: TM's normal and TM's mobile Left Ear: External: external ears normal Otoscopy Ear Canal: canal clear Otoscopy TM: TM's normal and TM's mobile  Hearing: grossly intact  Nose: nares normal and septum midline  Neck: supple and adenopathy none palpable  Thyroid: normal and nodules No    Assessment & Plan:    Problem List Items Addressed This Visit     Vertigo - Primary     Vertigo/dizzinessquestionably from cardiac etiology based on current findings  Plan: I advised the patient that if she is cleared from cardiology, I can offer a referral to physical therapy for vestibular treatment. She reports that she will call if she receives clearance. She was reminded to call if she had any further questions or problems. No orders of the defined types were placed in this encounter. Orders Placed This Encounter   Medications    meclizine (ANTIVERT) 12.5 MG tablet     Sig: Take 1 tablet by mouth 3 times daily as needed for Dizziness (may take two for severe vertigo)     Dispense:  30 tablet     Refill:  3     Electronically signed by Curtis Chin PA-C on 6/1/21 at 5:41 PM CDT            Please note that this chart was generated using dragon dictation software. Although every effort was made to ensure the accuracy of this automated transcription, some errors in transcription may have occurred.

## 2021-06-01 NOTE — PROGRESS NOTES
History   Isabel Aguilar is a 76 y.o. female who presented to the clinic this date with complaints of dizziness. She reported episodes of lightheadedness and imbalance when standing. She reported symptoms have been ongoing for several years but have gotten significantly worse in the last few months. She has had a cardiac workup and is waiting on results. She denied changes in hearing and tinnitus. Summary   Tympanometry consistent with normal TM mobility bilaterally. Pure tone testing indicates normal to severe SNHL bilaterally. Results   Otoscopy:    Right: Clear EAC/Normal TM   Left: Clear EAC/Normal TM    Audiometry:    Right: Normal to severe sloping SNHL   Left: Normal to severe sloping SNHL         Tympanometry:     Right: Type A   Left: Type A    Plan   Results of today's testing were discussed with Ms. Kulwinder Giraldo and the following recommendations were made:    1. Follow up with ENT as scheduled.         Audiogram and Acoustic Immittance

## 2021-06-02 ENCOUNTER — CARE COORDINATION (OUTPATIENT)
Dept: CASE MANAGEMENT | Age: 76
End: 2021-06-02

## 2021-06-02 NOTE — CARE COORDINATION
Wolfgang 45 Transitions Follow Up Call    2021    Patient: Armond Zee  Patient : 1945   MRN: <[id number]>    Discharge Date: 21 RARS: Readmission Risk Score: 10         Spoke with: N/A    Care Transitions Subsequent and Final Call    Subsequent and Final Calls  Care Transitions Interventions  Other Interventions: Follow Up  Future Appointments   Date Time Provider Francisco Leyva   2021 11:00 AM NEO Yates [facility] [place]io [place]   2021  1:45 PM NEO Gillis [facility] Cardio [place]   2021 10:00 AM MD Adrianna Bowen [facility]     Attempted to contact patient for a transitions of care follow up. Unable to reach patient. Caller left a hipaa compliant vm regarding the nature of the call with contact information for a return call. Will follow up.     Antoni Pearce RN

## 2021-06-02 NOTE — CARE COORDINATION
Wolfgang 45 Transitions Follow Up Call    2021    Patient: Jed Alexandre  Patient : 1945   MRN: <[id number]>    Discharge Date: 21 RARS: Readmission Risk Score: 10         Spoke with: N/A    Care Transitions Subsequent and Final Call    Subsequent and Final Calls  Care Transitions Interventions  Other Interventions: Follow Up  Future Appointments   Date Time Provider Francisco Leyva   2021 11:00 AM NEO Posada [facility] Cardio [place]   2021  1:45 PM NEO Weber [facility] [place]io [place]   2021 10:00 AM MD Shanae Bishop [facility]     Received a incoming call from patient while on the phone with another patient. Attempted to call back without success. Caller left a hipaa compliant message with contact information. Will attempt again.     Ricky Carlos RN

## 2021-06-08 PROBLEM — N39.0 UTI (URINARY TRACT INFECTION): Status: RESOLVED | Noted: 2021-05-09 | Resolved: 2021-06-08

## 2021-06-09 ENCOUNTER — CARE COORDINATION (OUTPATIENT)
Dept: CASE MANAGEMENT | Age: 76
End: 2021-06-09

## 2021-06-09 NOTE — CARE COORDINATION
Wolfgang 45 Transitions Follow Up Call    2021    Patient: Gabriel Weber  Patient : 1945   MRN: [id number]      Discharge Date: 21 RARS: Readmission Risk Score: 10         Spoke with: Zain Banuelos Transitions Subsequent and Final Call    Schedule Follow Up Appointment with PCP: Completed  Subsequent and Final Calls  Have your medications changed?: No  Do you have any questions related to your medications?: No  Do you currently have any active services?: No  Do you have any needs or concerns that I can assist you with?: No  Identified Barriers: None  Care Transitions Interventions  Other Interventions:         Placed a call to the patient for follow up. She reported that she is still quite weak. She said she is still having dizziness as well. She is also having shortness of breath with exertion. She denied any nausea, vomiting, syncope, edema, cough, congestion, other symptoms. She said that her blood pressure is better, but her heart rate is still running in the 50s and low 60s. She said that the doctors are talking pacemaker. She goes back tomorrow to find out the results of her Zio Patch. She is anxious to see what that shows. She is eating well, drinking well. She is not aware of any other issues. She has all of her meds and is taking as ordered. To call prn issues. CTN to follow up as indicated.      Follow Up  Future Appointments   Date Time Provider Francisco Leyva   2021  1:45 PM NEO Wylie Card[facility] [place]   2021 10:00 AM MD Aspen Burkett [facility]       Sonam Moss RN

## 2021-06-10 ENCOUNTER — TELEPHONE (OUTPATIENT)
Dept: CARDIOLOGY CLINIC | Age: 76
End: 2021-06-10

## 2021-06-10 NOTE — TELEPHONE ENCOUNTER
Annie Skinner- patient was supposed to see you yesterday for hosp follow and notes stated she needed to be seen on a day Dr. Talya Mensah is in office. She didn't realize her appt was for yesterday and thought it was for today. Preservice dept rescheduled her for next Wednesday but they transferred her to me because she is still having the dizziness she was having at hospital admission. She wore a ZIO and mailed it back in on 5/25/21. She states HR stays around 62, it did get around 62 yesterday. This am HR 62 and /90 so she took a valsartan. She states the dizziness isn't everyday but she has it more often than not. She has seen eye doc and hearing doc that stated eveyrthing was normal. However her ear doctor did tell her that is everything come backs normal with ZIO to let him know and he will set her up with physical therapy to do something with ear crystals, he told her that could possibly be cause of dizziness. I didn't know if you had ZIO report to review to see if we tell her anything. She was crying by the end of the phone call stating she cant do anything. She states she is dizzy regardless if she is sitting, walking, or laying down.

## 2021-06-10 NOTE — TELEPHONE ENCOUNTER
Do not see final report but pulled up preliminary report for Zio- it does not look like any significant findings. Looks like Dr. Ashanti Nichole will be reading it officially today  However keep her follow-up appointment next week.   Thus far does not look like this is cardiac

## 2021-06-16 ENCOUNTER — OFFICE VISIT (OUTPATIENT)
Dept: CARDIOLOGY CLINIC | Age: 76
End: 2021-06-16
Payer: MEDICARE

## 2021-06-16 VITALS
DIASTOLIC BLOOD PRESSURE: 78 MMHG | HEIGHT: 60 IN | SYSTOLIC BLOOD PRESSURE: 136 MMHG | BODY MASS INDEX: 31.61 KG/M2 | HEART RATE: 58 BPM | WEIGHT: 161 LBS

## 2021-06-16 DIAGNOSIS — I10 ESSENTIAL HYPERTENSION: ICD-10-CM

## 2021-06-16 DIAGNOSIS — R42 DIZZINESS: ICD-10-CM

## 2021-06-16 DIAGNOSIS — I47.1 PAROXYSMAL SVT (SUPRAVENTRICULAR TACHYCARDIA) (HCC): ICD-10-CM

## 2021-06-16 DIAGNOSIS — R00.1 SYMPTOMATIC BRADYCARDIA: ICD-10-CM

## 2021-06-16 DIAGNOSIS — I48.0 PAF (PAROXYSMAL ATRIAL FIBRILLATION) (HCC): Primary | ICD-10-CM

## 2021-06-16 PROCEDURE — G8399 PT W/DXA RESULTS DOCUMENT: HCPCS | Performed by: CLINICAL NURSE SPECIALIST

## 2021-06-16 PROCEDURE — 1123F ACP DISCUSS/DSCN MKR DOCD: CPT | Performed by: CLINICAL NURSE SPECIALIST

## 2021-06-16 PROCEDURE — G8417 CALC BMI ABV UP PARAM F/U: HCPCS | Performed by: CLINICAL NURSE SPECIALIST

## 2021-06-16 PROCEDURE — 4040F PNEUMOC VAC/ADMIN/RCVD: CPT | Performed by: CLINICAL NURSE SPECIALIST

## 2021-06-16 PROCEDURE — 1036F TOBACCO NON-USER: CPT | Performed by: CLINICAL NURSE SPECIALIST

## 2021-06-16 PROCEDURE — 99214 OFFICE O/P EST MOD 30 MIN: CPT | Performed by: CLINICAL NURSE SPECIALIST

## 2021-06-16 PROCEDURE — 3017F COLORECTAL CA SCREEN DOC REV: CPT | Performed by: CLINICAL NURSE SPECIALIST

## 2021-06-16 PROCEDURE — G8427 DOCREV CUR MEDS BY ELIG CLIN: HCPCS | Performed by: CLINICAL NURSE SPECIALIST

## 2021-06-16 PROCEDURE — 93000 ELECTROCARDIOGRAM COMPLETE: CPT | Performed by: CLINICAL NURSE SPECIALIST

## 2021-06-16 PROCEDURE — 1090F PRES/ABSN URINE INCON ASSESS: CPT | Performed by: CLINICAL NURSE SPECIALIST

## 2021-06-16 NOTE — PATIENT INSTRUCTIONS
OK to proceed with PT for dizziness   Let us know if you have more frequent or longer lasting episodes of fast heart rates  Heart rates 50-60s is ok  BP goal < 130/80 at rest  Follow up in Sept as planned   Call with any questions or concerns  Follow up with NEO Tejada for non cardiac problems  Report any new problems  Cardiovascular Fitness-Exercise as tolerated. Strive for 30 minutes of exercise most days of the week. Cardiac / Healthy Diet  Continue current medications as directed  Continue plan of treatment  It is always recommended that you bring your medications bottles with you to each visit - this is for your safety!

## 2021-06-16 NOTE — PROGRESS NOTES
Naina  Cardiology  [facility] [address]  [id number]  Phone: [phone]  Fax: [phone]    OFFICE VISIT:  6/16/2021    [address]: 1945    Reason For Visit:  Laura Alonzo is a 76 y.o. female who is here for Follow-up (patient has dizziness ), Bradycardia, and Hypertension  History of paroxysmal atrial fibrillation, depression, hypertension, hyperlipidemia, neurofibromatosis. Previously on antihypertensives and beta-blocker for rate control. Not anticoagulated due to previous history of epistaxis from aspirin. She presented to the hospital 5/6/2021 with dizziness found to be bradycardic and hypotensive. Antihypertensives and beta-blocker was discontinued. 2D echo shows normal LV size and function with EF 55 to 60%. Mild concentric LVH. Mild MR and mildly dilated LA. Heart catheterization 5/6/2021 showed nonobstructive coronary disease with ostial RCA approximately 55% stenosis. Mid circumflex 20% stenosis and LAD moderate to severe distal vessel tortuosity    Patient was discharged home on Zio monitor. 14-day ZIO monitor showed normal sinus rhythm with heart rate 461 09.  9 SVT episodes with the fastest lasting 6 beats in the longest lasting 13 beats. Rare ectopy noted    ENT referral due to chronic maxillary sinusitis. Returns today in follow-up. She continues to have dizziness that is positional and more persistent. Home HR 50-60s  BP running in the 120s/70s. Only taking the diovan a few days if > 140s  She is on Lexapro but no changes in doing and takes regularly         Subjective  Laura Alonzo denies exertional chest pain, shortness of breath, orthopnea, paroxysmal nocturnal dyspnea, syncope, presyncope, arrhythmia, edema and fatigue. The patient denies numbness or weakness to suggest cerebrovascular accident or transient ischemic attack. NEO Juan is PCP and follows labs.   Nate Franci has the following history as recorded in [facility]: Patient Active Problem List    Diagnosis Date Noted    Vertigo 2021    Symptomatic bradycardia 2021    Essential hypertension 2019    New onset a-fib ([facility] [address] 2019    Hyperlipidemia 2018    Abnormal urination 2018    Female stress incontinence 2017    Prolapse of vaginal vault after hysterectomy 2017    Rectocele 2017    Enterocele 2017    Cystocele, midline 2017     Past Medical History:   Diagnosis Date    Arthritis     Atrial fibrillation ([address]     Carpal tunnel syndrome     x2 right hand    Depression     History of blood clots     behind left knee    History of vein stripping     left leg    Hyperlipidemia     Hypertension     Neurofibromatosis ([facility] [address]     Polio     as a child    Tachycardia      Past Surgical History:   Procedure Laterality Date    ANKLE SURGERY Left     3 surgeries    APPENDECTOMY      BLADDER SURGERY      x 2    CARDIAC CATHETERIZATION  2021    Cx 20%.  RCA 55%    CARPAL TUNNEL RELEASE Right     COLONOSCOPY  2019    Dr William Hassan [facility]-Retained stool, TVA, (-) dysplasia x 1, HP x 1, 3 yr recall    HERNIA REPAIR      HYSTERECTOMY      MASTECTOMY, BILATERAL Bilateral     2010 had implants redone    SINUS SURGERY      UPPER GASTROINTESTINAL ENDOSCOPY  2019    Dr William Hassan [facility]-Duodenitis, gastric antral ulcer, gastritis, repeat in 3 months    UPPER GASTROINTESTINAL ENDOSCOPY N/A 2020    Dr Nazario Alcantar-Esophagitis, gastritis, well healed gastric ulceration      Family History   Problem Relation Age of Onset    Hypertension Father     Stroke Father          age 80    Brain Cancer Mother     Colon Cancer Neg Hx     Colon Polyps Neg Hx      Social History     Tobacco Use    Smoking status: Former Smoker     Packs/day: 1.00     Years: 24.00     Pack years: 24.00     Types: Cigarettes     Quit date:      Years since quittin.4    Smokeless tobacco: Never Used Substance Use Topics    Alcohol use: Yes     Comment: Occasional.      Current Outpatient Medications   Medication Sig Dispense Refill    valsartan (DIOVAN) 40 MG tablet Take 40 mg by mouth daily      escitalopram (LEXAPRO) 20 MG tablet Take 1 tablet by mouth daily 30 tablet 3    omeprazole 20 MG EC tablet Take 20 mg by mouth daily      Cholecalciferol (VITAMIN D3) 2000 units CAPS Take 2,000 Units by mouth      ondansetron (ZOFRAN ODT) 4 MG disintegrating tablet Take 1 tablet by mouth every 8 hours as needed for Nausea Take one prior to beginning colon prep (Patient not taking: Reported on 6/1/2021) 5 tablet 0     No current facility-administered medications for this visit. Allergies: Patient has no known allergies. Review of Systems  Constitutional  no significant activity change, appetite change, or unexpected weight change. No fever, chills or diaphoresis. No fatigue. HEENT  no significant rhinorrhea or epistaxis. No tinnitus or significant hearing loss. Eyes  no sudden vision change or amaurosis. Respiratory  no significant wheezing, stridor, apnea or cough. No dyspnea on exertion or shortness of breath. Cardiovascular  no exertional chest pain, orthopnea or PND. No sensation of arrhythmia or slow heart rate. No claudication or leg edema. Gastrointestinal  no abdominal swelling or pain. No blood in stool. No severe constipation, diarrhea, nausea, or vomiting. Genitourinary  no difficulty urinating, dysuria, frequency, or urgency. No flank pain or hematuria. Musculoskeletal +back pain,no  gait disturbance, or myalgia. Skin  no color change or rash. No pallor. No new surgical incision. Neurologic  no speech difficulty, facial asymmetry or lateralizing weakness. No seizures, presyncope, syncope, + significant dizziness. Hematologic  no easy bruising or excessive bleeding. Psychiatric  no severe anxiety or insomnia. No confusion.    All other review of systems are negative. Objective  Vital Signs - /78   Pulse 58   Ht 5' (1.524 m)   Wt 161 lb (73 kg)   BMI 31.44 kg/m²   General - Brody is alert, cooperative, and pleasant. Well groomed. No acute distress. Body habitus is overweight. HEENT  The head is normocephalic. No circumoral cyanosis. Dentition is normal.   EYES -  No Xanthelasma, no arcus senilis, no conjunctival hemorrhages or discharge. Neck - Supple, without increased jugular venous pressures. No carotid bruits. No mass. Respiratory - Lungs are clear bilaterally. No wheezes or rales. Normal effort without use of accessory muscles. Cardiovascular  Heart has regular rhythm and rate. No murmurs, rubs or gallops. + pedal pulses and no varicosities. Abdominal -  Soft, nontender, nondistended. Bowel sounds are intact. Extremities - No clubbing, cyanosis, or  edema. Musculoskeletal -  No clubbing . No Osler's nodes. Gait normal .  No kyphosis or scoliosis. Skin -  no statis ulcers or dermatitis. Neurological - No focal signs are identified. Oriented to person, place and time. Psychiatric -  Appropriate affect and mood. Assessment:     Diagnosis Orders   1. PAF (paroxysmal atrial fibrillation) ([facility])     2. Paroxysmal SVT (supraventricular tachycardia) ([address]     3. Essential hypertension     4. Symptomatic bradycardia  EKG 12 lead   5. Dizziness       Data:  BP Readings from Last 3 Encounters:   06/16/21 136/78   06/01/21 130/68   05/17/21 100/68    Pulse Readings from Last 3 Encounters:   06/16/21 58   05/17/21 59   05/11/21 55        Wt Readings from Last 3 Encounters:   06/16/21 161 lb (73 kg)   06/01/21 160 lb (72.6 kg)   05/17/21 156 lb (70.8 kg)   EKG today shows sinus bradycardia with a rate of 58. Negative anterior T waves. Unchanged EKG       Reviewed PCP recent notes  Reviewed ENT notes and recommendations. Found to have vertigo and offered physical therapy for vestibular treatment.   Should be okay to proceed with therapy-we will let ENT know   Reviewed recent labs    Nonocclusive coronary disease per her heart catheterization. Recommend ongoing medical management with blood pressure control and cholesterol control    Adolfo did not show any sustained tachy or narinder rare ectopy. Agree staying off  beta-blocker. We will have her monitor for any increase in frequency or duration of any fast rates. Heart rate in the 50s and 60s is okay    Dizzy most of the time wearing when wearing the monitor. Therefore not felt to be cardiac in nature. Agree with proceeding with physical therapy/Epley maneuvers    Some hypotension. Taking her ARB if blood pressures greater than 140. She will continue to monitor this     States taking medications as prescribed  Stable cardiovascular status. No evidence of overt heart failure, angina or dysrhythmia. 30 minutes were spent preparing, reviewing and seeing patient. All questions answered    Plan  OK to proceed with PT for dizziness   Let us know if you have more frequent or longer lasting episodes of fast heart rates  Heart rates 50-60s is ok  BP goal < 130/80 at rest  Follow up in Sept as planned   Call with any questions or concerns  Follow up with NEO Lam for non cardiac problems  Report any new problems  Cardiovascular Fitness-Exercise as tolerated. Strive for 30 minutes of exercise most days of the week. Cardiac / Healthy Diet  Continue current medications as directed  Continue plan of treatment  It is always recommended that you bring your medications bottles with you to each visit - this is for your safety! NEO Grimes    EMR dragon/transcription disclaimer: Much of this encounter note is electronic transcription/translation of spoken language to printed tach. Electronic translation of spoken language may be erroneous, or at times, nonsensical words or phrases may be inadvertently transcribed.  Although, I have reviewed the note for such errors, some may still exist.

## 2021-06-22 ENCOUNTER — TELEPHONE (OUTPATIENT)
Dept: ENT CLINIC | Age: 76
End: 2021-06-22

## 2021-06-22 DIAGNOSIS — H81.13 BPPV (BENIGN PAROXYSMAL POSITIONAL VERTIGO), BILATERAL: Primary | ICD-10-CM

## 2021-06-25 ENCOUNTER — HOSPITAL ENCOUNTER (OUTPATIENT)
Dept: GENERAL RADIOLOGY | Age: 76
Discharge: HOME | End: 2021-06-25
Payer: MEDICARE

## 2021-06-25 DIAGNOSIS — N18.31 STAGE 3A CHRONIC KIDNEY DISEASE (HCC): ICD-10-CM

## 2021-06-25 PROCEDURE — 76770 US EXAM ABDO BACK WALL COMP: CPT

## 2021-08-05 ENCOUNTER — OFFICE VISIT (OUTPATIENT)
Dept: FAMILY MEDICINE CLINIC | Age: 76
End: 2021-08-05
Payer: MEDICARE

## 2021-08-05 ENCOUNTER — HOSPITAL ENCOUNTER (OUTPATIENT)
Dept: GENERAL RADIOLOGY | Age: 76
Discharge: HOME | End: 2021-08-05
Payer: MEDICARE

## 2021-08-05 VITALS
WEIGHT: 162 LBS | BODY MASS INDEX: 31.64 KG/M2 | SYSTOLIC BLOOD PRESSURE: 120 MMHG | TEMPERATURE: 98.2 F | DIASTOLIC BLOOD PRESSURE: 72 MMHG | RESPIRATION RATE: 20 BRPM | HEART RATE: 45 BPM | OXYGEN SATURATION: 98 %

## 2021-08-05 DIAGNOSIS — M25.471 SWOLLEN R ANKLE: ICD-10-CM

## 2021-08-05 DIAGNOSIS — R22.41 LOCALIZED SWELLING OF RIGHT FOOT: ICD-10-CM

## 2021-08-05 DIAGNOSIS — S82.891A CLOSED AVULSION FRACTURE OF RIGHT ANKLE, INITIAL ENCOUNTER: ICD-10-CM

## 2021-08-05 DIAGNOSIS — M25.471 SWOLLEN R ANKLE: Primary | ICD-10-CM

## 2021-08-05 DIAGNOSIS — R00.1 BRADYCARDIA: ICD-10-CM

## 2021-08-05 PROCEDURE — G8427 DOCREV CUR MEDS BY ELIG CLIN: HCPCS | Performed by: NURSE PRACTITIONER

## 2021-08-05 PROCEDURE — 73630 X-RAY EXAM OF FOOT: CPT

## 2021-08-05 PROCEDURE — 4040F PNEUMOC VAC/ADMIN/RCVD: CPT | Performed by: NURSE PRACTITIONER

## 2021-08-05 PROCEDURE — 1090F PRES/ABSN URINE INCON ASSESS: CPT | Performed by: NURSE PRACTITIONER

## 2021-08-05 PROCEDURE — 99213 OFFICE O/P EST LOW 20 MIN: CPT | Performed by: NURSE PRACTITIONER

## 2021-08-05 PROCEDURE — 1123F ACP DISCUSS/DSCN MKR DOCD: CPT | Performed by: NURSE PRACTITIONER

## 2021-08-05 PROCEDURE — 1036F TOBACCO NON-USER: CPT | Performed by: NURSE PRACTITIONER

## 2021-08-05 PROCEDURE — G8417 CALC BMI ABV UP PARAM F/U: HCPCS | Performed by: NURSE PRACTITIONER

## 2021-08-05 PROCEDURE — G8399 PT W/DXA RESULTS DOCUMENT: HCPCS | Performed by: NURSE PRACTITIONER

## 2021-08-05 PROCEDURE — 73610 X-RAY EXAM OF ANKLE: CPT

## 2021-08-05 NOTE — PROGRESS NOTES
SUBJECTIVE:  Ankle pain   Patient ID: Florentino Rabago is a 68 y.o. female. HPI:   HPI   Turned the ankle outward on Saturday. Rolled it is now swollen and painful on the outside and up around the fibula. She is also wanting to schedule her AWV. EXAMINATION:  XR ANKLE RIGHT (MIN 3 VIEWS)  8/5/2021 10:07 AM   HISTORY: Right ankle injury with pain and swelling. COMPARISON: No comparison study. TECHNIQUE: 3 views were obtained. FINDINGS: India Chang is a 6 mm avulsion fracture at the tip of the lateral   malleolus. There is no other visible fracture line. The ankle joint   space is maintained. There is soft tissue swelling of the lower leg   and ankle diffusely. The swelling is slightly worse along the lateral   side of the ankle.       Impression   1. Likely acute 6 mm a avulsion fracture at the tip of the lateral   malleolus. No other fracture is seen. Ankle joint space is maintained. 2. Soft tissue swelling. Signed by Dr Dilma Montano     EXAMINATION: Gabriel Nunes (MIN 3 VIEWS)  8/5/2021 10:16 AM   HISTORY: Injury. Pain and swelling right foot. COMPARISON: No comparison study. TECHNIQUE: 3 views were obtained. FINDINGS:  No displaced fracture is identified. There is narrowing of   the first MTP joint and multiple interphalangeal joints. There is a   tiny plantar calcaneal spur. There is soft tissue swelling of the   ankle and foot. The soft tissue swelling appears more severe at the   ankle.       Impression   1. No displaced fracture is seen of the foot. 2. Joint space narrowing, as described. Tiny plantar calcaneal spur. 3. Soft tissue swelling.    Signed by Dr Dilma Montano           Past Medical History:   Diagnosis Date    Arthritis     Atrial fibrillation ([address]     Carpal tunnel syndrome     x2 right hand    Depression     History of blood clots     behind left knee    History of vein stripping     left leg    Hyperlipidemia     Hypertension     Neurofibromatosis ([address]     Polio     as a child    Tachycardia       Prior to Visit Medications    Medication Sig Taking? Authorizing Provider   escitalopram (LEXAPRO) 20 MG tablet Take 1 tablet by mouth daily Yes Alexis Eldridge MD   omeprazole 20 MG EC tablet Take 20 mg by mouth daily Yes Historical Provider, MD   Cholecalciferol (VITAMIN D3) 2000 units CAPS Take 2,000 Units by mouth Yes Historical Provider, MD   valsartan (DIOVAN) 40 MG tablet Take 40 mg by mouth daily  Patient not taking: Reported on 8/5/2021  Historical Provider, MD   ondansetron (ZOFRAN ODT) 4 MG disintegrating tablet Take 1 tablet by mouth every 8 hours as needed for Nausea Take one prior to beginning colon prep  Patient not taking: Reported on 6/1/2021  NEO Gongora NP      No Known Allergies    Review of Systems   Musculoskeletal: Positive for arthralgias, gait problem and joint swelling. OBJECTIVE:    Physical Exam  Constitutional:       Appearance: Normal appearance. She is well-developed. She is not ill-appearing. HENT:      Head: Normocephalic and atraumatic. Right Ear: External ear normal.      Left Ear: External ear normal.      Nose: Nose normal.      Mouth/Throat:      Lips: Pink. Mouth: Mucous membranes are moist.   Eyes:      General: Lids are normal.      Extraocular Movements: Extraocular movements intact. Conjunctiva/sclera: Conjunctivae normal.   Cardiovascular:      Rate and Rhythm: Normal rate and regular rhythm. Heart sounds: Normal heart sounds. No murmur heard. Pulmonary:      Effort: Pulmonary effort is normal.      Breath sounds: Normal breath sounds. Musculoskeletal:      Cervical back: Normal range of motion. Right ankle: Swelling and deformity present. Tenderness present over the lateral malleolus. Decreased range of motion. Feet:    Feet:      Comments: Ankle is swollen and tender to touch   Skin:     General: Skin is warm and dry.    Neurological:      Mental Status: She is

## 2021-08-09 ENCOUNTER — OFFICE VISIT (OUTPATIENT)
Dept: FAMILY MEDICINE CLINIC | Age: 76
End: 2021-08-09
Payer: MEDICARE

## 2021-08-09 ENCOUNTER — HOSPITAL ENCOUNTER (OUTPATIENT)
Dept: GENERAL RADIOLOGY | Age: 76
Discharge: HOME | End: 2021-08-09
Payer: MEDICARE

## 2021-08-09 VITALS
HEIGHT: 60 IN | TEMPERATURE: 97.6 F | RESPIRATION RATE: 20 BRPM | SYSTOLIC BLOOD PRESSURE: 118 MMHG | DIASTOLIC BLOOD PRESSURE: 70 MMHG | OXYGEN SATURATION: 98 % | WEIGHT: 163 LBS | BODY MASS INDEX: 32 KG/M2 | HEART RATE: 59 BPM

## 2021-08-09 DIAGNOSIS — E04.1 THYROID NODULE: ICD-10-CM

## 2021-08-09 DIAGNOSIS — Z13.6 SCREENING FOR CARDIOVASCULAR CONDITION: ICD-10-CM

## 2021-08-09 DIAGNOSIS — Z23 NEED FOR SHINGLES VACCINE: Primary | ICD-10-CM

## 2021-08-09 DIAGNOSIS — Z72.89 OTHER PROBLEMS RELATED TO LIFESTYLE: ICD-10-CM

## 2021-08-09 DIAGNOSIS — Z11.59 NEED FOR HEPATITIS C SCREENING TEST: ICD-10-CM

## 2021-08-09 DIAGNOSIS — Z00.00 ROUTINE GENERAL MEDICAL EXAMINATION AT A HEALTH CARE FACILITY: ICD-10-CM

## 2021-08-09 PROCEDURE — 76536 US EXAM OF HEAD AND NECK: CPT

## 2021-08-09 PROCEDURE — 4040F PNEUMOC VAC/ADMIN/RCVD: CPT | Performed by: NURSE PRACTITIONER

## 2021-08-09 PROCEDURE — 1123F ACP DISCUSS/DSCN MKR DOCD: CPT | Performed by: NURSE PRACTITIONER

## 2021-08-09 PROCEDURE — G0438 PPPS, INITIAL VISIT: HCPCS | Performed by: NURSE PRACTITIONER

## 2021-08-09 PROCEDURE — 90471 IMMUNIZATION ADMIN: CPT | Performed by: NURSE PRACTITIONER

## 2021-08-09 PROCEDURE — 90750 HZV VACC RECOMBINANT IM: CPT | Performed by: NURSE PRACTITIONER

## 2021-08-09 SDOH — ECONOMIC STABILITY: FOOD INSECURITY: WITHIN THE PAST 12 MONTHS, YOU WORRIED THAT YOUR FOOD WOULD RUN OUT BEFORE YOU GOT MONEY TO BUY MORE.: NEVER TRUE

## 2021-08-09 SDOH — ECONOMIC STABILITY: FOOD INSECURITY: WITHIN THE PAST 12 MONTHS, THE FOOD YOU BOUGHT JUST DIDN'T LAST AND YOU DIDN'T HAVE MONEY TO GET MORE.: NEVER TRUE

## 2021-08-09 ASSESSMENT — PATIENT HEALTH QUESTIONNAIRE - PHQ9
7. TROUBLE CONCENTRATING ON THINGS, SUCH AS READING THE NEWSPAPER OR WATCHING TELEVISION: 2
5. POOR APPETITE OR OVEREATING: 0
8. MOVING OR SPEAKING SO SLOWLY THAT OTHER PEOPLE COULD HAVE NOTICED. OR THE OPPOSITE, BEING SO FIGETY OR RESTLESS THAT YOU HAVE BEEN MOVING AROUND A LOT MORE THAN USUAL: 1
6. FEELING BAD ABOUT YOURSELF - OR THAT YOU ARE A FAILURE OR HAVE LET YOURSELF OR YOUR FAMILY DOWN: 3
SUM OF ALL RESPONSES TO PHQ9 QUESTIONS 1 & 2: 6
1. LITTLE INTEREST OR PLEASURE IN DOING THINGS: 3
SUM OF ALL RESPONSES TO PHQ QUESTIONS 1-9: 17
2. FEELING DOWN, DEPRESSED OR HOPELESS: 3
4. FEELING TIRED OR HAVING LITTLE ENERGY: 1
9. THOUGHTS THAT YOU WOULD BE BETTER OFF DEAD, OR OF HURTING YOURSELF: 1
SUM OF ALL RESPONSES TO PHQ QUESTIONS 1-9: 17
10. IF YOU CHECKED OFF ANY PROBLEMS, HOW DIFFICULT HAVE THESE PROBLEMS MADE IT FOR YOU TO DO YOUR WORK, TAKE CARE OF THINGS AT HOME, OR GET ALONG WITH OTHER PEOPLE: 3
SUM OF ALL RESPONSES TO PHQ QUESTIONS 1-9: 16
3. TROUBLE FALLING OR STAYING ASLEEP: 3

## 2021-08-09 ASSESSMENT — COLUMBIA-SUICIDE SEVERITY RATING SCALE - C-SSRS
6. HAVE YOU EVER DONE ANYTHING, STARTED TO DO ANYTHING, OR PREPARED TO DO ANYTHING TO END YOUR LIFE?: NO
1. WITHIN THE PAST MONTH, HAVE YOU WISHED YOU WERE DEAD OR WISHED YOU COULD GO TO SLEEP AND NOT WAKE UP?: YES
2. HAVE YOU ACTUALLY HAD ANY THOUGHTS OF KILLING YOURSELF?: NO

## 2021-08-09 ASSESSMENT — LIFESTYLE VARIABLES
HOW OFTEN DO YOU HAVE A DRINK CONTAINING ALCOHOL: 2
HOW OFTEN DO YOU HAVE SIX OR MORE DRINKS ON ONE OCCASION: 0
HOW OFTEN DURING THE LAST YEAR HAVE YOU FOUND THAT YOU WERE NOT ABLE TO STOP DRINKING ONCE YOU HAD STARTED: 0
HAS A RELATIVE, FRIEND, DOCTOR, OR ANOTHER HEALTH PROFESSIONAL EXPRESSED CONCERN ABOUT YOUR DRINKING OR SUGGESTED YOU CUT DOWN: 0
HAVE YOU OR SOMEONE ELSE BEEN INJURED AS A RESULT OF YOUR DRINKING: 0
HOW OFTEN DURING THE LAST YEAR HAVE YOU FAILED TO DO WHAT WAS NORMALLY EXPECTED FROM YOU BECAUSE OF DRINKING: 0
AUDIT-C TOTAL SCORE: 2
HOW OFTEN DURING THE LAST YEAR HAVE YOU BEEN UNABLE TO REMEMBER WHAT HAPPENED THE NIGHT BEFORE BECAUSE YOU HAD BEEN DRINKING: 0
HOW OFTEN DURING THE LAST YEAR HAVE YOU NEEDED AN ALCOHOLIC DRINK FIRST THING IN THE MORNING TO GET YOURSELF GOING AFTER A NIGHT OF HEAVY DRINKING: 0
AUDIT TOTAL SCORE: 2
HOW OFTEN DURING THE LAST YEAR HAVE YOU HAD A FEELING OF GUILT OR REMORSE AFTER DRINKING: 0
HOW MANY STANDARD DRINKS CONTAINING ALCOHOL DO YOU HAVE ON A TYPICAL DAY: 0

## 2021-08-09 ASSESSMENT — SOCIAL DETERMINANTS OF HEALTH (SDOH): HOW HARD IS IT FOR YOU TO PAY FOR THE VERY BASICS LIKE FOOD, HOUSING, MEDICAL CARE, AND HEATING?: NOT HARD AT ALL

## 2021-08-09 NOTE — PATIENT INSTRUCTIONS
Learning About Healthy Weight  What is a healthy weight? A healthy weight is the weight at which you feel good about yourself and have energy for work and play. It's also one that lowers your risk for health problems. What can you do to stay at a healthy weight? It can be hard to stay at a healthy weight, especially when fast food, vending-machine snacks, and processed foods are so easy to find. And with your busy lifestyle, activity may be low on your list of things to do. But staying at a healthy weight may be easier than you think. Here are some dos and don'ts for staying at a healthy weight. Do eat healthy foods  The kinds of foods you eat have a big impact on both your weight and your health. Reaching and staying at a healthy weight is not about going on a diet. It's about making healthier food choices every day and changing your diet for good. Healthy eating means eating a variety of foods so that you get all the nutrients you need. Your body needs protein, carbohydrate, and fats for energy. They keep your heart beating, your brain active, and your muscles working. On most days, try to eat from each food group. This means eating a variety of:  · Whole grains, such as whole wheat breads and pastas. · Fruits and vegetables. · Dairy products, such as low-fat milk, yogurt, and cheese. · Lean proteins, such as all types of fish, chicken without the skin, and beans. Don't have too much or too little of one thing. All foods, if eaten in moderation, can be part of healthy eating. Even sweets can be okay. If your favorite foods are high in fat, salt, sugar, or calories, limit how often you eat them. Eat smaller servings, or look for healthy substitutes. Do watch what you eat  Many people eat more than their bodies need. Part of staying at a healthy weight means learning how much food you really need from day to day and not eating more than that.  Even with healthy foods, eating too much can make you gain weight. Having a well-balanced diet means that you eat enough, but not too much, and that your food gives you the nutrients you need to stay healthy. So listen to your body. Eat when you're hungry. Stop when you feel satisfied. It's a good idea to have healthy snacks ready for when you get hungry. Keep healthy snacks with you at work, in your car, and at home. If you have a healthy snack easily available, you'll be less likely to pick a candy bar or bag of chips from a vending machine instead. Some healthy snacks you might want to keep on hand are fruit, low-fat yogurt, string cheese, low-fat microwave popcorn, raisins and other dried fruit, nuts, whole wheat crackers, pretzels, carrots, celery sticks, and broccoli. Do some physical activity  A big part of reaching and staying at a healthy weight is being active. When you're active, you burn calories. This makes it easier to reach and stay at a healthy weight. When you're active on a regular basis, your body burns more calories, even when you're at rest. Being active helps you lose fat and build lean muscle. Try to be active for at least 1 hour every day. This may sound like a lot, but it's okay to be active in smaller blocks of time that add up to 1 hour a day. Any activity that makes your heart beat faster and keeps it there for a while counts. A brisk walk, run, or swim will get your heart beating faster. So will climbing stairs, shooting baskets, or cycling. Even some household chores like vacuuming and mowing the lawn will get your heart rate up. Pick activities that you enjoyones that make your heart beat faster, your muscles stronger, and your muscles and joints more flexible. If you find more than one thing you like doing, do them all. You don't have to do the same thing every day. Don't diet  Diets don't work. Diets are temporary. Because you give up so much when you diet, you may be hungry and think about food all the time.  And after you stop dieting, you also may overeat to make up for what you missed. Most people who diet end up gaining back the pounds they lostand more. Remember that healthy bodies come in lots of shapes and sizes. Everyone can get healthier by eating better and being more active. Where can you learn more? Go to [email or web address] and sign in to your [employer] account. Enter [id number] in the [employer] box to learn more about \"Learning About Healthy Weight. \"     If you do not have an account, please click on the \"Sign Up Now\" link. Current as of: March 17, 2021               Content Version: 12.9  © 2006-2021 Healthwise, [employer]. Care instructions adapted under license by [facility] ([facility]). If you have questions about a medical condition or this instruction, always ask your healthcare professional. [place] [address] any warranty or liability for your use of this information. Personalized Preventive Plan for Tracy Calvillo - 8/9/2021  Medicare offers a range of preventive health benefits. Some of the tests and screenings are paid in full while other may be subject to a deductible, co-insurance, and/or copay. Some of these benefits include a comprehensive review of your medical history including lifestyle, illnesses that may run in your family, and various assessments and screenings as appropriate. After reviewing your medical record and screening and assessments performed today your provider may have ordered immunizations, labs, imaging, and/or referrals for you. A list of these orders (if applicable) as well as your Preventive Care list are included within your After Visit Summary for your review. Other Preventive Recommendations:    · A preventive eye exam performed by an eye specialist is recommended every 1-2 years to screen for glaucoma; cataracts, macular degeneration, and other eye disorders.   · A preventive dental visit is recommended every 6 months. · Try to get at least 150 minutes of exercise per week or 10,000 steps per day on a pedometer . · Order or download the FREE \"Exercise & Physical Activity: Your Everyday Guide\" from [employer]. Call 3-383.334.3812 or search [employer] online. · You need 0861-6763 mg of calcium and 5810-6681 IU of vitamin D per day. It is possible to meet your calcium requirement with diet alone, but a vitamin D supplement is usually necessary to meet this goal.  · When exposed to the sun, use a sunscreen that protects against both UVA and UVB radiation with an SPF of 30 or greater. Reapply every 2 to 3 hours or after sweating, drying off with a towel, or swimming. · Always wear a seat belt when traveling in a car. Always wear a helmet when riding a bicycle or motorcycle.

## 2021-08-09 NOTE — PROGRESS NOTES
CARPAL TUNNEL RELEASE Right     COLONOSCOPY  2019    Dr Rahat Low Co-Retained stool, TVA, (-) dysplasia x 1, HP x 1, 3 yr recall    HERNIA REPAIR      HYSTERECTOMY      MASTECTOMY, BILATERAL Bilateral     2010 had implants redone    SINUS SURGERY      UPPER GASTROINTESTINAL ENDOSCOPY  2019    Dr Rahat Low Co-Duodenitis, gastric antral ulcer, gastritis, repeat in 3 months    UPPER GASTROINTESTINAL ENDOSCOPY N/A 2020    Dr Patricia Alcantar-Esophagitis, gastritis, well healed gastric ulceration          Family History   Problem Relation Age of Onset    Hypertension Father     Stroke Father          age 80    Brain Cancer Mother     Colon Cancer Neg Hx     Colon Polyps Neg Hx        CareTeam (Including outside providers/suppliers regularly involved in providing care):   Patient Care Team:  NEO Calderon as PCP - General (Nurse Practitioner Family)  NEO Calderon as PCP - [facility] Empaneled Provider  Emily Abraham MD as Consulting Physician (Interventional Cardiology)    Wt Readings from Last 3 Encounters:   21 163 lb (73.9 kg)   21 162 lb (73.5 kg)   21 161 lb (73 kg)     Vitals:    21 1037   BP: 118/70   Site: Left Upper Arm   Position: Sitting   Cuff Size: Large Adult   Pulse: 59   Resp: 20   Temp: 97.6 °F (36.4 °C)   TempSrc: Temporal   SpO2: 98%   Weight: 163 lb (73.9 kg)   Height: 5' (1.524 m)     Body mass index is 31.83 kg/m². Based upon direct observation of the patient, evaluation of cognition reveals recent and remote memory intact.     General Appearance: alert and oriented to person, place and time, well developed and well- nourished, in no acute distress  Skin: warm and dry, no rash or erythema  Head: normocephalic and atraumatic  Eyes: pupils equal, round, and reactive to light, extraocular eye movements intact, conjunctivae normal  ENT: tympanic membrane, external ear and ear canal normal bilaterally, nose without deformity, nasal mucosa and turbinates normal without polyps  Neck: supple and non-tender without mass, no thyromegaly or thyroid nodules, no cervical lymphadenopathy  Pulmonary/Chest: clear to auscultation bilaterally- no wheezes, rales or rhonchi, normal air movement, no respiratory distress  Cardiovascular: normal rate, regular rhythm, normal S1 and S2, no murmurs, rubs, clicks, or gallops, distal pulses intact, no carotid bruits  Abdomen: soft, non-tender, non-distended, normal bowel sounds, no masses or organomegaly  Extremities: no cyanosis, clubbing or edema  Musculoskeletal: normal range of motion, no joint swelling, deformity or tenderness  Neurologic: reflexes normal and symmetric, no cranial nerve deficit, gait, coordination and speech normal  Has an montrell that will help her to reminder to take her medication   Patient's complete Health Risk Assessment and screening values have been reviewed and are found in Flowsheets. The following problems were reviewed today and where indicated follow up appointments were made and/or referrals ordered. Positive Risk Factor Screenings with Interventions:     Fall Risk:  Timed Up and Go Test > 12 seconds? (Complete if either Fall Risk answers are Yes): no  2 or more falls in past year?: no  Fall with injury in past year?: (!) yes  Fall Risk Interventions:    · Patient declines any further evaluation/treatment for this issue     Depression:  PHQ-2 Score: 6  PHQ-9 Total Score: 17    Severity:1-4 = minimal depression, 5-9 = mild depression, 10-14 = moderate depression, 15-19 = moderately severe depression, 20-27 = severe depression  Depression Interventions:  · LPN INTERVENTION GUIDE: SCORE 15 OR ABOVE = MODERATE TO SEVERE DEPRESSION: PCP CONSULTED DURING VISIT        General Health and ACP:  General  In general, how would you say your health is?: Very Good  In the past 7 days, have you experienced any of the following?  New or Increased Pain, New or Increased Fatigue, Loneliness, Social Isolation, Stress or Anger?: (!) Loneliness, Stress  Do you get the social and emotional support that you need?: (!) No  Do you have a Living Will?: Yes  Advance Directives     Power of  Living Will ACP-Advance Directive ACP-Power of     Not on File Filed on 02/19/20 Filed Not on File      General Health Risk Interventions:  · No Living Will: Advance Care Planning addressed with patient today  · depression  plans to restart counseling      Health Habits/Nutrition:  Health Habits/Nutrition  Do you exercise for at least 20 minutes 2-3 times per week?: (!) No  Have you lost any weight without trying in the past 3 months?: No  Do you eat only one meal per day?: (!) Yes  Have you seen the dentist within the past year?: (!) No  Body mass index: (!) 31.83  Health Habits/Nutrition Interventions:  · Inadequate physical activity:  once foot healed will start walking program     Safety:  Safety  Do you have working smoke detectors?: Yes  Have all throw rugs been removed or fastened?: Yes  Do you have non-slip mats or surfaces in all bathtubs/showers?: (!) No  Do all of your stairways have a railing or banister?: Yes  Are your doorways, halls and stairs free of clutter?: Yes  Do you always fasten your seatbelt when you are in a car?: Yes  Safety Interventions:  · Patient declines any further evaluation/treatment for this issue     Personalized Preventive Plan   Current Health Maintenance Status  Immunization History   Administered Date(s) Administered    Influenza, High Dose (Fluzone 65 yrs and older) 10/20/2018    Pneumococcal Polysaccharide ([id number]) 08/15/2019    Zoster Recombinant (Shingrix) 10/02/2018        Health Maintenance   Topic Date Due    Hepatitis C screen  Never done    COVID-19 Vaccine (1) Never done    DTaP/Tdap/Td vaccine (1 - Tdap) Never done    Shingles Vaccine (2 of 2) 11/27/2018    Annual Wellness Visit (AWV)  Never done    Flu vaccine (1) 09/01/2021    Potassium monitoring 05/11/2022    Creatinine monitoring  05/11/2022    DEXA (modify frequency per FRAX score)  Completed    Pneumococcal 65+ years Vaccine  Completed    Hepatitis A vaccine  Aged Out    Hepatitis B vaccine  Aged Out    Hib vaccine  Aged Out    Meningococcal (ACWY) vaccine  Aged Out     Recommendations for [place] Due: see orders and patient instructions/AVS.  . Recommended screening schedule for the next 5-10 years is provided to the patient in written form: see Patient Nat Terrazas was seen today for medicare aw. Diagnoses and all orders for this visit:    Need for shingles vaccine  -     Discontinue: zoster recombinant adjuvanted vaccine [facility]) injection 50 mcg    Thyroid nodule  -     US THYROID; Future    BMI 31.0-31.9,adult    Screening for cardiovascular condition  -     Lipid Panel; Future    Need for hepatitis C screening test  -     Hepatitis C Antibody; Future    Other problems related to lifestyle  -     Hepatitis C Antibody; Future    Routine general medical examination at a health care facility                 Advance Care Planning   Advanced Care Planning: Discussed the patients choices for care and treatment in case of a health event that adversely affects decision-making abilities. Also discussed the patients long-term treatment options. Reviewed the process of designating a Health Care Surrogate as defined by the [facility]. Reviewed the [facility] Will Directive process and the kinds of life-sustaining treatments the patient would like to receive should they become terminally ill or permanently unconscious. Explained the state requirement to complete the forms in the presence of two eligible witnesses OR in the presence of a . Patient was asked to provide a copy of the signed forms to the practice office.   Time spent (minutes): will bring in plan

## 2021-08-17 RX ORDER — ATORVASTATIN CALCIUM 20 MG/1
20 TABLET, FILM COATED ORAL DAILY
Qty: 30 TABLET | Refills: 3 | Status: SHIPPED | OUTPATIENT
Start: 2021-08-17 | End: 2021-12-16

## 2021-09-21 ENCOUNTER — OFFICE VISIT (OUTPATIENT)
Dept: CARDIOLOGY CLINIC | Age: 76
End: 2021-09-21
Payer: MEDICARE

## 2021-09-21 VITALS
OXYGEN SATURATION: 97 % | DIASTOLIC BLOOD PRESSURE: 78 MMHG | BODY MASS INDEX: 30.58 KG/M2 | WEIGHT: 162 LBS | SYSTOLIC BLOOD PRESSURE: 120 MMHG | HEIGHT: 61 IN | HEART RATE: 56 BPM

## 2021-09-21 DIAGNOSIS — I10 ESSENTIAL HYPERTENSION: Primary | ICD-10-CM

## 2021-09-21 DIAGNOSIS — I47.1 PAROXYSMAL SVT (SUPRAVENTRICULAR TACHYCARDIA) (HCC): ICD-10-CM

## 2021-09-21 DIAGNOSIS — I25.10 CORONARY ARTERY DISEASE, NON-OCCLUSIVE: ICD-10-CM

## 2021-09-21 DIAGNOSIS — R00.1 SYMPTOMATIC BRADYCARDIA: ICD-10-CM

## 2021-09-21 PROBLEM — I48.91 NEW ONSET A-FIB (HCC): Status: RESOLVED | Noted: 2019-02-22 | Resolved: 2021-09-21

## 2021-09-21 PROCEDURE — 1036F TOBACCO NON-USER: CPT | Performed by: CLINICAL NURSE SPECIALIST

## 2021-09-21 PROCEDURE — G8399 PT W/DXA RESULTS DOCUMENT: HCPCS | Performed by: CLINICAL NURSE SPECIALIST

## 2021-09-21 PROCEDURE — 1123F ACP DISCUSS/DSCN MKR DOCD: CPT | Performed by: CLINICAL NURSE SPECIALIST

## 2021-09-21 PROCEDURE — 99214 OFFICE O/P EST MOD 30 MIN: CPT | Performed by: CLINICAL NURSE SPECIALIST

## 2021-09-21 PROCEDURE — 4040F PNEUMOC VAC/ADMIN/RCVD: CPT | Performed by: CLINICAL NURSE SPECIALIST

## 2021-09-21 PROCEDURE — 1090F PRES/ABSN URINE INCON ASSESS: CPT | Performed by: CLINICAL NURSE SPECIALIST

## 2021-09-21 PROCEDURE — G8427 DOCREV CUR MEDS BY ELIG CLIN: HCPCS | Performed by: CLINICAL NURSE SPECIALIST

## 2021-09-21 PROCEDURE — G8417 CALC BMI ABV UP PARAM F/U: HCPCS | Performed by: CLINICAL NURSE SPECIALIST

## 2021-09-21 ASSESSMENT — ENCOUNTER SYMPTOMS
NAUSEA: 0
EYE REDNESS: 0
SHORTNESS OF BREATH: 0
CHEST TIGHTNESS: 0
COUGH: 0
VOMITING: 0
FACIAL SWELLING: 0
WHEEZING: 0
ABDOMINAL PAIN: 0

## 2021-09-21 NOTE — PATIENT INSTRUCTIONS
Return in about 6 months (around 3/21/2022) for APRN.    Call for dizziness, weakness, passing out spells

## 2021-09-21 NOTE — PROGRESS NOTES
[facility], [address], [address]  [id number]  Phone: [phone]  Fax: [phone]    OFFICE VISIT:  9/21/2021    [address]: 1945    Reason For Visit:  Vicente Chaudhary is a 68 y.o. female who is here for 6 Month Follow-Up (Patient states that she is not having the palpitations like she was. ), Palpitations, and Atrial Fibrillation       Diagnosis Orders   1. Essential hypertension     2. Symptomatic bradycardia     3. Paroxysmal SVT (supraventricular tachycardia) ([address]     4. Coronary artery disease, non-occlusive           HPI  Patient was initially seen in our office with complaints of palpitations and brief SVT on heart monitor in 2019    She presented to the hospital 5/6/2021 with dizziness found to be bradycardic and hypotensive. Antihypertensives and beta-blocker were discontinued. 2D echo shows normal LV size and function with EF 55 to 60%. Mild concentric LVH. Mild MR and mildly dilated LA.     Heart catheterization 5/6/2021 showed nonobstructive coronary disease with ostial RCA approximately 55% stenosis. Mid circumflex 20% stenosis and LAD moderate to severe distal vessel tortuosity    Patient denies chest pain or dyspnea. No issues with dizziness, lightheadedness, syncope. She states she feels much better off the beta-blocker     NEO Miller is PCP.   Kelli Patterson has the following history as recorded in [facility]:    Patient Active Problem List    Diagnosis Date Noted    Vertigo 06/01/2021    Symptomatic bradycardia 05/06/2021    Essential hypertension 02/27/2019    Hyperlipidemia 08/17/2018    Abnormal urination 08/17/2018    Female stress incontinence 11/09/2017    Prolapse of vaginal vault after hysterectomy 11/09/2017    Rectocele 11/09/2017    Enterocele 11/09/2017    Cystocele, midline 11/09/2017     Past Medical History:   Diagnosis Date    Arthritis     Atrial fibrillation (HCC)     Carpal tunnel syndrome     x2 right hand    Depression     History of blood clots     behind left knee    History of vein stripping     left leg    Hyperlipidemia     Hypertension     Neurofibromatosis ([address]     Polio     as a child    Tachycardia      Past Surgical History:   Procedure Laterality Date    ANKLE SURGERY Left     3 surgeries    APPENDECTOMY      BLADDER SURGERY      x 2    CARDIAC CATHETERIZATION  2021    Cx 20%. RCA 55%    CARPAL TUNNEL RELEASE Right     COLONOSCOPY  2019    Dr Katya eLster Co-Retained stool, TVA, (-) dysplasia x 1, HP x 1, 3 yr recall    HERNIA REPAIR      HYSTERECTOMY      MASTECTOMY, BILATERAL Bilateral     2010 had implants redone    SINUS SURGERY      UPPER GASTROINTESTINAL ENDOSCOPY  2019    Dr Katya Lester Co-Duodenitis, gastric antral ulcer, gastritis, repeat in 3 months    UPPER GASTROINTESTINAL ENDOSCOPY N/A 2020    Dr Cat Deleon Alcantar-Esophagitis, gastritis, well healed gastric ulceration      Family History   Problem Relation Age of Onset    Hypertension Father     Stroke Father          age 80    Brain Cancer Mother     Colon Cancer Neg Hx     Colon Polyps Neg Hx      Social History     Tobacco Use    Smoking status: Former Smoker     Packs/day: 1.00     Years: 24.00     Pack years: 24.00     Types: Cigarettes     Quit date:      Years since quittin.7    Smokeless tobacco: Never Used   Substance Use Topics    Alcohol use: Yes     Comment: Occasional.      Current Outpatient Medications   Medication Sig Dispense Refill    atorvastatin (LIPITOR) 20 MG tablet Take 1 tablet by mouth daily 30 tablet 3    valsartan (DIOVAN) 40 MG tablet Take 40 mg by mouth daily Patient states she was told to only take when BP goes over 140/90.       escitalopram (LEXAPRO) 20 MG tablet Take 1 tablet by mouth daily 30 tablet 3    omeprazole 20 MG EC tablet Take 20 mg by mouth daily      Cholecalciferol (VITAMIN D3) 2000 units CAPS Take 2,000 Units by mouth       No current facility-administered medications for this visit. Allergies: Patient has no known allergies. Review of Systems  Review of Systems   Constitutional: Negative for activity change, diaphoresis, fatigue, fever and unexpected weight change. HENT: Negative for facial swelling and nosebleeds. Eyes: Negative for redness and visual disturbance. Respiratory: Negative for cough, chest tightness, shortness of breath and wheezing. Cardiovascular: Negative for chest pain, palpitations and leg swelling. Gastrointestinal: Negative for abdominal pain, nausea and vomiting. Endocrine: Negative for cold intolerance and heat intolerance. Genitourinary: Negative for dysuria and hematuria. Musculoskeletal: Negative for arthralgias and myalgias. Skin: Negative for pallor and rash. Neurological: Negative for dizziness, seizures, syncope, weakness and light-headedness. Hematological: Does not bruise/bleed easily. Psychiatric/Behavioral: Negative for agitation. The patient is not nervous/anxious. Objective  Vital Signs - /78   Pulse 56   Ht 5' 1\" (1.549 m)   Wt 162 lb (73.5 kg)   SpO2 97%   BMI 30.61 kg/m²   Physical Exam  Vitals and nursing note reviewed. Constitutional:       General: She is not in acute distress. Appearance: She is well-developed. She is not diaphoretic. HENT:      Head: Normocephalic and atraumatic. Right Ear: Hearing and external ear normal.      Left Ear: Hearing and external ear normal.      Nose: Nose normal.   Eyes:      General:         Right eye: No discharge. Left eye: No discharge. Pupils: Pupils are equal, round, and reactive to light. Neck:      Thyroid: No thyromegaly. Vascular: No carotid bruit or JVD. Trachea: No tracheal deviation. Cardiovascular:      Rate and Rhythm: Normal rate and regular rhythm. Heart sounds: Normal heart sounds. No murmur heard. No friction rub. No gallop. Pulmonary:      Effort: Pulmonary effort is normal. No respiratory distress. Breath sounds: Normal breath sounds. No wheezing or rales. Abdominal:      Palpations: Abdomen is soft. Tenderness: There is no abdominal tenderness. Musculoskeletal:         General: No swelling or deformity. Cervical back: Neck supple. No muscular tenderness. Right lower leg: No edema. Left lower leg: Edema (trace, chronic) present. Skin:     General: Skin is warm and dry. Findings: No rash. Neurological:      General: No focal deficit present. Mental Status: She is alert and oriented to person, place, and time. Cranial Nerves: No cranial nerve deficit. Psychiatric:         Mood and Affect: Mood normal.         Behavior: Behavior normal.         Judgment: Judgment normal.         Data:  Lab Results   Component Value Date    WBC 7.5 05/11/2021    RBC 4.40 05/11/2021    HGB 12.8 05/11/2021    HCT 40.2 05/11/2021     05/11/2021      Lab Results   Component Value Date    CHOL 292 08/09/2021    TRIG 203 08/09/2021    HDL 55 08/09/2021    LDLCALC 196 08/09/2021     Lab Results   Component Value Date     05/11/2021    K 4.7 05/11/2021     05/11/2021    CO2 26 05/11/2021    GLUCOSE 90 05/11/2021    BUN 20 05/11/2021    CREATININE 1.1 05/11/2021    CALCIUM 9.3 05/11/2021    ALT 29 05/06/2021    AST 29 05/06/2021     Lab Results   Component Value Date    TSH 3.080 05/08/2021     Heart Cath 5/11/21  Conclusions      Nonobstructive CAD with ostial RCA intermediate grade stenosis. Normal LV systolic function. Recommendations      Medical management. Signatures      ----------------------------------------------------------------   Electronically signed by Bobby Yan MD(Performing Physician) on   05/11/2021 15:10    Echo 5/7//21  Conclusions      Summary   Mitral valve leaflets are mildly thickened with preserved leaflet   mobility.    Mild mitral regurgitation is present. Mildly thickened aortic valve leaflets with preserved leaflet mobility. Tricuspid valve is structurally normal.   Mild tricuspid regurgitation with estimated RVSP of 44 mm Hg. Mildly dilated left atrium. Normal left ventricular size with preserved LV function and an estimated   ejection fraction of approximately 55-60%. Impaired relaxation compatible with diastolic dysfunction. ( reversed E/A   ratio)   Mild concentric left ventricular hypertrophy. No regional wall motion abnormalities. Signature      ----------------------------------------------------------------   Electronically signed by Edis Santillan MD(Interpreting   physician) on 05/08/2021 10:02 AM    Zio patch heart monitor 6/15/2021  Sinus   9 brief SVT, longest 13 beats  No significant bradycardia, pauses or heart block  No atrial fibrillation    Zio patch heart monitor 3/11/2019  Worn 4 days and 12 hours  Predominantly sinus rhythm  No significant bradycardia, pauses or heart block  6 SVT, longest 9 beats    Assessment:     Diagnosis Orders   1. Essential hypertension     2. Symptomatic bradycardia     3. Paroxysmal SVT (supraventricular tachycardia) ([address]     4. Coronary artery disease, non-occlusive          Hypertensionshe only uses valsartan on an as-needed basis if BP over 140/90. Symptomatic bradycardiawith hospitalization in May 2021. Beta-blocker was discontinued. Zio patch heart monitor showed no significant pauses, heart block or arrhythmias. Monitor for signs of dizziness, lightheadedness or syncope    Paroxysmal SVTbrief per recent heart monitor. Occasional brief palpitations, not symptomatic. Continue to monitor    CADnonocclusive per heart cath in May 2021. Continue atorvastatin  Stable cardiovascular status. No evidence of overt heart failure,angina or dysrhythmia. Plan    No orders of the defined types were placed in this encounter. Return in about 6 months (around 3/21/2022) for APRN. Call for dizziness, weakness, passing out spells    Call with any questionsor concerns  Follow up with Linde Rubinstein, APRN for non cardiac problems  Report any new problems  Cardiovascular Fitness-Exercise as tolerated. Strive for 15 minutes of exercise most days of the week. Cardiac / HealthyDiet  Continue current medications as directed  Continue plan of treatment  It is always recommended that you bring your medicationsbottles with you to each visit - this is for your safety!        NEO Vinson

## 2022-02-14 ENCOUNTER — OFFICE VISIT (OUTPATIENT)
Dept: FAMILY MEDICINE CLINIC | Age: 77
End: 2022-02-14
Payer: MEDICARE

## 2022-02-14 VITALS
DIASTOLIC BLOOD PRESSURE: 94 MMHG | BODY MASS INDEX: 30.78 KG/M2 | HEIGHT: 61 IN | RESPIRATION RATE: 20 BRPM | HEART RATE: 52 BPM | TEMPERATURE: 97.6 F | WEIGHT: 163 LBS | SYSTOLIC BLOOD PRESSURE: 162 MMHG

## 2022-02-14 DIAGNOSIS — R03.0 ELEVATED BLOOD PRESSURE READING: ICD-10-CM

## 2022-02-14 DIAGNOSIS — I49.9 IRREGULAR HEARTBEAT: Primary | ICD-10-CM

## 2022-02-14 PROCEDURE — G8417 CALC BMI ABV UP PARAM F/U: HCPCS | Performed by: NURSE PRACTITIONER

## 2022-02-14 PROCEDURE — G8484 FLU IMMUNIZE NO ADMIN: HCPCS | Performed by: NURSE PRACTITIONER

## 2022-02-14 PROCEDURE — G8427 DOCREV CUR MEDS BY ELIG CLIN: HCPCS | Performed by: NURSE PRACTITIONER

## 2022-02-14 PROCEDURE — 1123F ACP DISCUSS/DSCN MKR DOCD: CPT | Performed by: NURSE PRACTITIONER

## 2022-02-14 PROCEDURE — 93000 ELECTROCARDIOGRAM COMPLETE: CPT | Performed by: NURSE PRACTITIONER

## 2022-02-14 PROCEDURE — G8399 PT W/DXA RESULTS DOCUMENT: HCPCS | Performed by: NURSE PRACTITIONER

## 2022-02-14 PROCEDURE — 4040F PNEUMOC VAC/ADMIN/RCVD: CPT | Performed by: NURSE PRACTITIONER

## 2022-02-14 PROCEDURE — 1090F PRES/ABSN URINE INCON ASSESS: CPT | Performed by: NURSE PRACTITIONER

## 2022-02-14 PROCEDURE — 99213 OFFICE O/P EST LOW 20 MIN: CPT | Performed by: NURSE PRACTITIONER

## 2022-02-14 PROCEDURE — 1036F TOBACCO NON-USER: CPT | Performed by: NURSE PRACTITIONER

## 2022-02-14 ASSESSMENT — ENCOUNTER SYMPTOMS
DIARRHEA: 0
COUGH: 0
SHORTNESS OF BREATH: 0
TROUBLE SWALLOWING: 0
ABDOMINAL PAIN: 0

## 2022-02-14 NOTE — PROGRESS NOTES
SUBJECTIVE:  ? Stress   Patient ID: Quan Krishna is a 68 y.o. female. HPI:   HPI   Several deaths with cousins, sister-in-law, and brother-in-law. Niece with [facility] on the lung Transplant team. Due to problems. Has been taking the Valsartan, Started taking Regularly. Over the weekend. Only taking when blood pressure   Complaints of dizziness at times. States was in the hospital for dizziness and went to therapy which  Ms. daughter he comes in today and she is complaining of dizziness sedated also occurred back in May she went to the hospital had multiple test that were done at the hospital she did have some blockage and the LAD had moderate to severe blockage but it was a tortuous vessel from what I can understand on the hospital note the dizziness they related it to an ENT problem which is vestibular physical therapy which she ended up doing and improved  This time her blood pressure is elevated 162/94 she only takes her Diovan on a as needed basis she had been taking a beta-blocker for control of heart rate and SVT but her heart rate is really slow in comparison to the last year's EKG is essentially the same no changes heart rates in the 50s what we will do is were going to encourage her to take the valsartan regularly she is only been taking it daily for over the weekend and see if that brings her blood pressure back down rather than increasing the dosage in 2 weeks she is to come back we will reevaluate and then also consider vestibular therapy    Past Medical History:   Diagnosis Date    Arthritis     Atrial fibrillation ([address]     Carpal tunnel syndrome     x2 right hand    Depression     History of blood clots     behind left knee    History of vein stripping     left leg    Hyperlipidemia     Hypertension     Neurofibromatosis ([address]     Polio     as a child    Tachycardia       Prior to Visit Medications    Medication Sig Taking?  Authorizing Provider   atorvastatin (LIPITOR) 20 MG tablet TAKE 1 TABLET BY MOUTH DAILY Yes Rodo Ax, NEO   valsartan (DIOVAN) 40 MG tablet Take 40 mg by mouth daily Patient states she was told to only take when BP goes over 140/90. Yes Historical Provider, MD   escitalopram (LEXAPRO) 20 MG tablet Take 1 tablet by mouth daily Yes Jameel Lund MD   omeprazole 20 MG EC tablet Take 20 mg by mouth daily Yes Historical Provider, MD   Cholecalciferol (VITAMIN D3) 2000 units CAPS Take 2,000 Units by mouth Yes Historical Provider, MD      No Known Allergies    Review of Systems   Constitutional: Negative for fever and unexpected weight change. HENT: Negative for congestion and trouble swallowing. Respiratory: Negative for cough and shortness of breath. Cardiovascular: Negative for chest pain and leg swelling. Gastrointestinal: Negative for abdominal pain and diarrhea. Genitourinary: Negative for difficulty urinating and dysuria. Musculoskeletal: Negative for arthralgias and gait problem. Neurological: Positive for dizziness. Negative for facial asymmetry, weakness and numbness. Psychiatric/Behavioral: Negative for sleep disturbance. The patient is nervous/anxious. OBJECTIVE:    Physical Exam  Constitutional:       Appearance: Normal appearance. She is well-developed and well-groomed. HENT:      Head: Normocephalic and atraumatic. Right Ear: Tympanic membrane, ear canal and external ear normal. There is no impacted cerumen. Left Ear: Tympanic membrane, ear canal and external ear normal. There is no impacted cerumen. Nose: Nose normal.      Mouth/Throat:      Lips: Pink. Mouth: Mucous membranes are moist.      Dentition: Normal dentition. Pharynx: Oropharynx is clear. Uvula midline. Eyes:      General: Lids are normal.         Right eye: No discharge. Left eye: No discharge. Extraocular Movements: Extraocular movements intact.       Conjunctiva/sclera: Conjunctivae normal.      Right eye: Right conjunctiva is not injected. Left eye: Left conjunctiva is not injected. Pupils: Pupils are equal, round, and reactive to light. Neck:      Thyroid: No thyromegaly. Vascular: No carotid bruit or JVD. Cardiovascular:      Rate and Rhythm: Normal rate and regular rhythm. Pulses: Normal pulses. Carotid pulses are 2+ on the right side and 2+ on the left side. Radial pulses are 2+ on the right side and 2+ on the left side. Heart sounds: Normal heart sounds, S1 normal and S2 normal. No murmur heard. Pulmonary:      Effort: Pulmonary effort is normal.      Breath sounds: Normal breath sounds. Chest:   Breasts:      Right: No supraclavicular adenopathy. Left: No supraclavicular adenopathy. Abdominal:      General: Bowel sounds are normal. There is no distension or abdominal bruit. Palpations: Abdomen is soft. There is no mass. Hernia: No hernia is present. Musculoskeletal:         General: Normal range of motion. Cervical back: Full passive range of motion without pain, normal range of motion and neck supple. Right lower leg: No edema. Left lower leg: No edema. Lymphadenopathy:      Cervical: No cervical adenopathy. Right cervical: No superficial cervical adenopathy. Left cervical: No superficial cervical adenopathy. Upper Body:      Right upper body: No supraclavicular adenopathy. Left upper body: No supraclavicular adenopathy. Skin:     General: Skin is warm and dry. Coloration: Skin is not pale. Findings: No lesion or rash. Nails: There is no clubbing. Neurological:      Mental Status: She is alert and oriented to person, place, and time. Motor: No weakness or tremor. Coordination: Coordination normal.      Deep Tendon Reflexes: Reflexes are normal and symmetric.    Psychiatric:         Attention and Perception: Attention normal.         Mood and Affect: Mood normal. Speech: Speech normal.         Behavior: Behavior normal. Behavior is cooperative. Thought Content: Thought content normal.         Cognition and Memory: Cognition and memory normal.         Judgment: Judgment normal.        BP (!) 162/94 (Site: Left Upper Arm, Position: Sitting, Cuff Size: Medium Adult)   Pulse 52   Temp 97.6 °F (36.4 °C) (Temporal)   Resp 20   Ht 5' 1\" (1.549 m)   Wt 163 lb (73.9 kg)   BMI 30.80 kg/m²      ASSESSMENT:      ICD-10-CM    1. Irregular heartbeat  I49.9 EKG 12 Lead     EKG 12 Lead   2. Elevated blood pressure reading  R03.0 EKG 12 Lead     EKG 12 Lead       PLAN:    Jose Carlos Fernandez: Hypertension (blood pressures have been elevated over the last couple weeks as high as 140's over 108 and 112 . ), Dizziness (elevated bp with dizziness ), and Irregular Heart Beat (has irregular heart rate )  Take Valsartan daily check BP  RTC in 2 weeks  Consider dose change or Vestibular therapy     Diagnosis and orders for this visit are above.

## 2022-03-07 ENCOUNTER — HOSPITAL ENCOUNTER (OUTPATIENT)
Dept: GENERAL RADIOLOGY | Age: 77
Discharge: HOME | End: 2022-03-07
Payer: MEDICARE

## 2022-03-07 ENCOUNTER — OFFICE VISIT (OUTPATIENT)
Dept: FAMILY MEDICINE CLINIC | Age: 77
End: 2022-03-07
Payer: MEDICARE

## 2022-03-07 VITALS
OXYGEN SATURATION: 100 % | SYSTOLIC BLOOD PRESSURE: 138 MMHG | DIASTOLIC BLOOD PRESSURE: 82 MMHG | BODY MASS INDEX: 31.93 KG/M2 | HEART RATE: 58 BPM | TEMPERATURE: 95.6 F | RESPIRATION RATE: 20 BRPM | WEIGHT: 169 LBS

## 2022-03-07 DIAGNOSIS — E04.1 THYROID NODULE: ICD-10-CM

## 2022-03-07 DIAGNOSIS — F33.1 MODERATE EPISODE OF RECURRENT MAJOR DEPRESSIVE DISORDER (HCC): ICD-10-CM

## 2022-03-07 DIAGNOSIS — E04.1 THYROID NODULE: Primary | ICD-10-CM

## 2022-03-07 DIAGNOSIS — I10 ESSENTIAL HYPERTENSION: ICD-10-CM

## 2022-03-07 DIAGNOSIS — M54.50 CHRONIC BILATERAL LOW BACK PAIN WITHOUT SCIATICA: ICD-10-CM

## 2022-03-07 DIAGNOSIS — E78.2 MIXED HYPERLIPIDEMIA: ICD-10-CM

## 2022-03-07 DIAGNOSIS — G89.29 CHRONIC BILATERAL LOW BACK PAIN WITHOUT SCIATICA: ICD-10-CM

## 2022-03-07 DIAGNOSIS — Z91.81 AT HIGH RISK FOR FALLS: ICD-10-CM

## 2022-03-07 PROCEDURE — 96372 THER/PROPH/DIAG INJ SC/IM: CPT | Performed by: NURSE PRACTITIONER

## 2022-03-07 PROCEDURE — 4040F PNEUMOC VAC/ADMIN/RCVD: CPT | Performed by: NURSE PRACTITIONER

## 2022-03-07 PROCEDURE — G8399 PT W/DXA RESULTS DOCUMENT: HCPCS | Performed by: NURSE PRACTITIONER

## 2022-03-07 PROCEDURE — 1036F TOBACCO NON-USER: CPT | Performed by: NURSE PRACTITIONER

## 2022-03-07 PROCEDURE — 1090F PRES/ABSN URINE INCON ASSESS: CPT | Performed by: NURSE PRACTITIONER

## 2022-03-07 PROCEDURE — G8417 CALC BMI ABV UP PARAM F/U: HCPCS | Performed by: NURSE PRACTITIONER

## 2022-03-07 PROCEDURE — 99214 OFFICE O/P EST MOD 30 MIN: CPT | Performed by: NURSE PRACTITIONER

## 2022-03-07 PROCEDURE — 1123F ACP DISCUSS/DSCN MKR DOCD: CPT | Performed by: NURSE PRACTITIONER

## 2022-03-07 PROCEDURE — 76536 US EXAM OF HEAD AND NECK: CPT

## 2022-03-07 PROCEDURE — G8484 FLU IMMUNIZE NO ADMIN: HCPCS | Performed by: NURSE PRACTITIONER

## 2022-03-07 PROCEDURE — G8427 DOCREV CUR MEDS BY ELIG CLIN: HCPCS | Performed by: NURSE PRACTITIONER

## 2022-03-07 RX ORDER — HYDROCODONE BITARTRATE AND ACETAMINOPHEN 7.5; 325 MG/1; MG/1
1 TABLET ORAL EVERY 6 HOURS PRN
COMMUNITY

## 2022-03-07 RX ORDER — DEXAMETHASONE SODIUM PHOSPHATE 10 MG/ML
4 INJECTION INTRAMUSCULAR; INTRAVENOUS ONCE
Status: COMPLETED | OUTPATIENT
Start: 2022-03-07 | End: 2022-03-07

## 2022-03-07 RX ORDER — VALSARTAN 40 MG/1
40 TABLET ORAL 2 TIMES DAILY
Qty: 180 TABLET | Refills: 1 | Status: SHIPPED | OUTPATIENT
Start: 2022-03-07 | End: 2022-03-14 | Stop reason: SDUPTHER

## 2022-03-07 RX ORDER — TRIAMCINOLONE ACETONIDE 40 MG/ML
40 INJECTION, SUSPENSION INTRA-ARTICULAR; INTRAMUSCULAR ONCE
Status: COMPLETED | OUTPATIENT
Start: 2022-03-07 | End: 2022-03-07

## 2022-03-07 RX ORDER — ESCITALOPRAM OXALATE 20 MG/1
TABLET ORAL
Qty: 30 TABLET | Refills: 3 | OUTPATIENT
Start: 2022-03-07

## 2022-03-07 RX ORDER — TIZANIDINE 4 MG/1
4 TABLET ORAL EVERY 6 HOURS PRN
COMMUNITY
End: 2022-03-07 | Stop reason: SDUPTHER

## 2022-03-07 RX ORDER — TIZANIDINE 4 MG/1
4 TABLET ORAL EVERY 6 HOURS PRN
Qty: 90 TABLET | Refills: 1 | Status: SHIPPED | OUTPATIENT
Start: 2022-03-07 | End: 2022-04-28

## 2022-03-07 RX ORDER — ESCITALOPRAM OXALATE 20 MG/1
20 TABLET ORAL DAILY
Qty: 90 TABLET | Refills: 1 | Status: SHIPPED | OUTPATIENT
Start: 2022-03-07 | End: 2022-07-07

## 2022-03-07 RX ADMIN — DEXAMETHASONE SODIUM PHOSPHATE 4 MG: 10 INJECTION INTRAMUSCULAR; INTRAVENOUS at 14:17

## 2022-03-07 RX ADMIN — TRIAMCINOLONE ACETONIDE 40 MG: 40 INJECTION, SUSPENSION INTRA-ARTICULAR; INTRAMUSCULAR at 14:18

## 2022-03-07 ASSESSMENT — ENCOUNTER SYMPTOMS
CONSTIPATION: 0
DIARRHEA: 1
SHORTNESS OF BREATH: 1
COUGH: 0
SINUS PRESSURE: 1
BACK PAIN: 1
PHOTOPHOBIA: 0

## 2022-03-07 NOTE — PROGRESS NOTES
SUBJECTIVE:  Wanting med refill  Blood pressure   Patient ID: Juliana Hilario is a 68 y.o. female. HPI:   HPI   Blood pressure states it has been running high in the morning. States was taking med 2 times a day because BP running high around 6 pm diovan. Most of the times it would work she does adjust her medication at times  This morning was 147 systolic. States didn't miss a dose. .     Back pain had a massage. And it usually cause back muscle pain. Eating her diet is antedating the refilled    Major Depression currently taking Lexapro but ran out. Having some crying spells due to being out of her medication but normally it works well    Tender spot on back black head has been present for 7 months this area is a abscess that has formed we used 1% lidocaine cleaned with alcohol and then used a 11 inch blade and express some exudate and she is to keep it open by using the warm compresses to that area to allow it to heal from the inside out  Past Medical History:   Diagnosis Date    Arthritis     Atrial fibrillation ([address]     Carpal tunnel syndrome     x2 right hand    Depression     History of blood clots     behind left knee    History of vein stripping     left leg    Hyperlipidemia     Hypertension     [facility]     Polio     as a child    Tachycardia       Prior to Visit Medications    Medication Sig Taking? Authorizing Provider   HYDROcodone-acetaminophen (NORCO) 7.5-325 MG per tablet Take 1 tablet by mouth every 6 hours as needed for Pain. Yes Historical Provider, MD   valsartan (DIOVAN) 40 MG tablet Take 1 tablet by mouth 2 times daily .  Yes NEO Diaz   escitalopram (LEXAPRO) 20 MG tablet Take 1 tablet by mouth daily Yes NEO Diaz   tiZANidine (ZANAFLEX) 4 MG tablet Take 1 tablet by mouth every 6 hours as needed (back pain) Yes NEO Diaz   atorvastatin (LIPITOR) 20 MG tablet TAKE 1 TABLET BY MOUTH DAILY Yes Remedios Rodriguez APRN   omeprazole 20 MG EC tablet Take 20 mg by mouth daily Yes Historical Provider, MD   Cholecalciferol (VITAMIN D3) 2000 units CAPS Take 2,000 Units by mouth Yes Historical Provider, MD     No Known Allergies    Review of Systems   Constitutional: Positive for unexpected weight change. Negative for fever. HENT: Positive for congestion and sinus pressure. Negative for nosebleeds. Eyes: Negative for photophobia and visual disturbance. Respiratory: Positive for shortness of breath. Negative for cough. Gastrointestinal: Positive for diarrhea. Negative for constipation. Genitourinary: Negative for difficulty urinating. Musculoskeletal: Positive for back pain and myalgias (muscle cramps legs ). Negative for arthralgias. Skin:        Black head    Neurological: Positive for dizziness. Negative for headaches. Psychiatric/Behavioral: Positive for dysphoric mood and sleep disturbance. OBJECTIVE:    Physical Exam  Constitutional:       Appearance: Normal appearance. She is well-developed and well-groomed. HENT:      Head: Normocephalic and atraumatic. Right Ear: Tympanic membrane, ear canal and external ear normal. There is no impacted cerumen. Left Ear: Tympanic membrane, ear canal and external ear normal. There is no impacted cerumen. Nose: Nose normal.      Mouth/Throat:      Lips: Pink. Mouth: Mucous membranes are moist.      Dentition: Normal dentition. Pharynx: Oropharynx is clear. Uvula midline. Eyes:      General: Lids are normal.         Right eye: No discharge. Left eye: No discharge. Extraocular Movements: Extraocular movements intact. Conjunctiva/sclera: Conjunctivae normal.      Right eye: Right conjunctiva is not injected. Left eye: Left conjunctiva is not injected. Pupils: Pupils are equal, round, and reactive to light. Neck:      Thyroid: No thyromegaly. Vascular: No carotid bruit or JVD.    Cardiovascular:      Rate and Rhythm: Normal rate and regular rhythm. Pulses: Normal pulses. Carotid pulses are 2+ on the right side and 2+ on the left side. Radial pulses are 2+ on the right side and 2+ on the left side. Heart sounds: Normal heart sounds, S1 normal and S2 normal. No murmur heard. Pulmonary:      Effort: Pulmonary effort is normal.      Breath sounds: Normal breath sounds. Chest:   Breasts:      Right: No supraclavicular adenopathy. Left: No supraclavicular adenopathy. Abdominal:      General: Bowel sounds are normal. There is no distension or abdominal bruit. Palpations: Abdomen is soft. There is no mass. Hernia: No hernia is present. Musculoskeletal:         General: Normal range of motion. Cervical back: Full passive range of motion without pain, normal range of motion and neck supple. Right lower leg: No edema. Left lower leg: No edema. Lymphadenopathy:      Cervical: No cervical adenopathy. Right cervical: No superficial cervical adenopathy. Left cervical: No superficial cervical adenopathy. Upper Body:      Right upper body: No supraclavicular adenopathy. Left upper body: No supraclavicular adenopathy. Skin:     General: Skin is warm and dry. Coloration: Skin is not pale. Findings: No lesion or rash. Nails: There is no clubbing. Neurological:      Mental Status: She is alert and oriented to person, place, and time. Motor: No weakness or tremor. Coordination: Coordination normal.      Deep Tendon Reflexes: Reflexes are normal and symmetric. Psychiatric:         Attention and Perception: Attention normal.         Mood and Affect: Mood normal.         Speech: Speech normal.         Behavior: Behavior normal. Behavior is cooperative. Thought Content:  Thought content normal.         Cognition and Memory: Cognition and memory normal.         Judgment: Judgment normal.        /82 (Site: Left Upper Arm, Position: Sitting, Cuff Size: Medium Adult)   Pulse 58   Temp 95.6 °F (35.3 °C) (Temporal)   Resp 20   Wt 169 lb (76.7 kg)   SpO2 100%   BMI 31.93 kg/m²      ASSESSMENT:      ICD-10-CM    1. Thyroid nodule  E04.1 TSH     T4, Free     US THYROID   2. Essential hypertension  I10 valsartan (DIOVAN) 40 MG tablet     CBC with Auto Differential     Comprehensive Metabolic Panel   3. Mixed hyperlipidemia  E78.2 Lipid, Fasting   4. Chronic bilateral low back pain without sciatica  M54.50 tiZANidine (ZANAFLEX) 4 MG tablet    G89.29 dexamethasone (DECADRON) injection 4 mg     triamcinolone acetonide (KENALOG-40) injection 40 mg   5. Moderate episode of recurrent major depressive disorder (HCC)  F33.1 escitalopram (LEXAPRO) 20 MG tablet       PLAN:    [place]: Hypertension (blood pressure was elevated this morning she is feeling dizzy ), Mole (has a spot on back wants looked at ), Back Pain (needing referral to pain management for back pain and pain medication ), and Thyroid Problem (needs US thyroid repeat was due in February )  Review labs    Diagnosis and orders for thisvisit are above. All patient questions answered. Pt voiced understanding. Reviewedhealth maintenance. Instructed to continue current medications, diet and exercise. Patient agreed with treatment plan. Follow up as directed. On the basis of positive falls risk screening, assessment and plan is as follows: home safety tips provided.

## 2022-03-08 DIAGNOSIS — E04.1 THYROID NODULE: ICD-10-CM

## 2022-03-08 DIAGNOSIS — I10 ESSENTIAL HYPERTENSION: ICD-10-CM

## 2022-03-08 DIAGNOSIS — E78.2 MIXED HYPERLIPIDEMIA: ICD-10-CM

## 2022-03-08 LAB
ALBUMIN SERPL-MCNC: 4 G/DL (ref 3.5–5.2)
ALP BLD-CCNC: 107 U/L (ref 35–104)
ALT SERPL-CCNC: 17 U/L (ref 5–33)
ANION GAP SERPL CALCULATED.3IONS-SCNC: 13 MMOL/L (ref 7–19)
AST SERPL-CCNC: 14 U/L (ref 5–32)
BASOPHILS ABSOLUTE: 0.1 K/UL (ref 0–0.2)
BASOPHILS RELATIVE PERCENT: 0.5 % (ref 0–1)
BILIRUB SERPL-MCNC: 0.3 MG/DL (ref 0.2–1.2)
BUN BLDV-MCNC: 17 MG/DL (ref 8–23)
CALCIUM SERPL-MCNC: 9.1 MG/DL (ref 8.8–10.2)
CHLORIDE BLD-SCNC: 105 MMOL/L (ref 98–111)
CHOLESTEROL, FASTING: 213 MG/DL (ref 160–199)
CO2: 21 MMOL/L (ref 22–29)
CREAT SERPL-MCNC: 0.8 MG/DL (ref 0.5–0.9)
EOSINOPHILS ABSOLUTE: 0 K/UL (ref 0–0.6)
EOSINOPHILS RELATIVE PERCENT: 0.1 % (ref 0–5)
GFR AFRICAN AMERICAN: >59
GFR NON-AFRICAN AMERICAN: >60
GLUCOSE BLD-MCNC: 72 MG/DL (ref 74–109)
HCT VFR BLD CALC: 41.2 % (ref 37–47)
HDLC SERPL-MCNC: 57 MG/DL (ref 65–121)
HEMOGLOBIN: 12.7 G/DL (ref 12–16)
IMMATURE GRANULOCYTES #: 0.2 K/UL
LDL CHOLESTEROL CALCULATED: 125 MG/DL
LYMPHOCYTES ABSOLUTE: 1.3 K/UL (ref 1.1–4.5)
LYMPHOCYTES RELATIVE PERCENT: 10.6 % (ref 20–40)
MCH RBC QN AUTO: 29.7 PG (ref 27–31)
MCHC RBC AUTO-ENTMCNC: 30.8 G/DL (ref 33–37)
MCV RBC AUTO: 96.3 FL (ref 81–99)
MONOCYTES ABSOLUTE: 0.9 K/UL (ref 0–0.9)
MONOCYTES RELATIVE PERCENT: 7.4 % (ref 0–10)
NEUTROPHILS ABSOLUTE: 10 K/UL (ref 1.5–7.5)
NEUTROPHILS RELATIVE PERCENT: 79.6 % (ref 50–65)
PDW BLD-RTO: 13.7 % (ref 11.5–14.5)
PLATELET # BLD: 265 K/UL (ref 130–400)
PMV BLD AUTO: 11.3 FL (ref 9.4–12.3)
POTASSIUM SERPL-SCNC: 4.6 MMOL/L (ref 3.5–5)
RBC # BLD: 4.28 M/UL (ref 4.2–5.4)
SODIUM BLD-SCNC: 139 MMOL/L (ref 136–145)
T4 FREE: 0.95 NG/DL (ref 0.93–1.7)
TOTAL PROTEIN: 6.9 G/DL (ref 6.6–8.7)
TRIGLYCERIDE, FASTING: 153 MG/DL (ref 0–149)
TSH SERPL DL<=0.05 MIU/L-ACNC: 0.88 UIU/ML (ref 0.27–4.2)
WBC # BLD: 12.5 K/UL (ref 4.8–10.8)

## 2022-03-14 ENCOUNTER — OFFICE VISIT (OUTPATIENT)
Dept: FAMILY MEDICINE CLINIC | Age: 77
End: 2022-03-14
Payer: MEDICARE

## 2022-03-14 ENCOUNTER — NURSE TRIAGE (OUTPATIENT)
Dept: OTHER | Facility: CLINIC | Age: 77
End: 2022-03-14

## 2022-03-14 VITALS
WEIGHT: 166 LBS | HEART RATE: 65 BPM | BODY MASS INDEX: 31.37 KG/M2 | SYSTOLIC BLOOD PRESSURE: 146 MMHG | TEMPERATURE: 97.8 F | OXYGEN SATURATION: 97 % | DIASTOLIC BLOOD PRESSURE: 84 MMHG

## 2022-03-14 DIAGNOSIS — J01.90 ACUTE SINUSITIS, RECURRENCE NOT SPECIFIED, UNSPECIFIED LOCATION: Primary | ICD-10-CM

## 2022-03-14 DIAGNOSIS — I10 ESSENTIAL HYPERTENSION: ICD-10-CM

## 2022-03-14 PROCEDURE — G8417 CALC BMI ABV UP PARAM F/U: HCPCS | Performed by: NURSE PRACTITIONER

## 2022-03-14 PROCEDURE — 4040F PNEUMOC VAC/ADMIN/RCVD: CPT | Performed by: NURSE PRACTITIONER

## 2022-03-14 PROCEDURE — G8399 PT W/DXA RESULTS DOCUMENT: HCPCS | Performed by: NURSE PRACTITIONER

## 2022-03-14 PROCEDURE — 1123F ACP DISCUSS/DSCN MKR DOCD: CPT | Performed by: NURSE PRACTITIONER

## 2022-03-14 PROCEDURE — 1090F PRES/ABSN URINE INCON ASSESS: CPT | Performed by: NURSE PRACTITIONER

## 2022-03-14 PROCEDURE — 99213 OFFICE O/P EST LOW 20 MIN: CPT | Performed by: NURSE PRACTITIONER

## 2022-03-14 PROCEDURE — G8427 DOCREV CUR MEDS BY ELIG CLIN: HCPCS | Performed by: NURSE PRACTITIONER

## 2022-03-14 PROCEDURE — G8484 FLU IMMUNIZE NO ADMIN: HCPCS | Performed by: NURSE PRACTITIONER

## 2022-03-14 PROCEDURE — 1036F TOBACCO NON-USER: CPT | Performed by: NURSE PRACTITIONER

## 2022-03-14 RX ORDER — AMOXICILLIN AND CLAVULANATE POTASSIUM 875; 125 MG/1; MG/1
1 TABLET, FILM COATED ORAL 2 TIMES DAILY
Qty: 20 TABLET | Refills: 0 | Status: SHIPPED | OUTPATIENT
Start: 2022-03-14 | End: 2022-03-24

## 2022-03-14 RX ORDER — VALSARTAN 80 MG/1
TABLET ORAL
Qty: 60 TABLET | Refills: 0
Start: 2022-03-14 | End: 2022-09-26

## 2022-03-14 NOTE — PROGRESS NOTES
SUBJECTIVE:  Blood pressure  Patient ID: Dung Padron is a 68 y.o. female. HPI:   HPI   Sinus  States always keeping sinus cleared and having yellowish drainage. In reviewing her labs here in the office everything looked pretty good but her white count neutrophils were elevated both absolute and percentage she said she always keeps a sinus problem but today she felt pretty good about it getting some yellow drainage she also has some dark circles under her eyes I would like to treat her with an antibiotic and see if that does not also bring down her white count  Blood pressure still running elevated 188  In the morning and then afterward lowest 150 mostly blood pressure still running elevated especially in the morning she has been adjusting her Diovan some of the ARB is really need a little more time in order to see the full effect but she is concerned because it is elevated both in the morning and in the afternoon so will adjusted a little bit more  Past Medical History:   Diagnosis Date    Arthritis     Atrial fibrillation ([address]     Carpal tunnel syndrome     x2 right hand    Depression     History of blood clots     behind left knee    History of vein stripping     left leg    Hyperlipidemia     Hypertension     [facility]fibromatosis [address] [address]     Polio     as a child    Tachycardia       Prior to Visit Medications    Medication Sig Taking? Authorizing Provider   amoxicillin-clavulanate (AUGMENTIN) 875-125 MG per tablet Take 1 tablet by mouth 2 times daily for 10 days Yes NEO Courtney   valsartan (DIOVAN) 80 MG tablet . 1 tablet in am and .5 tablet in the evening Yes NEO Courtney   HYDROcodone-acetaminophen (NORCO) 7.5-325 MG per tablet Take 1 tablet by mouth every 6 hours as needed for Pain.  Yes Historical Provider, MD   escitalopram (LEXAPRO) 20 MG tablet Take 1 tablet by mouth daily Yes NEO Courtney   tiZANidine (ZANAFLEX) 4 MG tablet Take 1 tablet by mouth every 6 hours as needed (back pain) Yes NEO Fierro   atorvastatin (LIPITOR) 20 MG tablet TAKE 1 TABLET BY MOUTH DAILY Yes NEO Fierro   omeprazole 20 MG EC tablet Take 20 mg by mouth daily Yes Historical Provider, MD   Cholecalciferol (VITAMIN D3) 2000 units CAPS Take 2,000 Units by mouth Yes Historical Provider, MD      No Known Allergies    Review of Systems   Constitutional: Negative for fever. HENT: Positive for congestion and sinus pressure. Negative for facial swelling. Respiratory: Negative for cough and shortness of breath. Neurological: Positive for headaches. OBJECTIVE:    Physical Exam  Constitutional:       Appearance: Normal appearance. She is well-developed. She is not ill-appearing. HENT:      Head: Normocephalic and atraumatic. Right Ear: External ear normal.      Left Ear: External ear normal.      Nose: Nose normal.      Mouth/Throat:      Lips: Pink. Mouth: Mucous membranes are moist.   Eyes:      General: Lids are normal.      Extraocular Movements: Extraocular movements intact. Conjunctiva/sclera: Conjunctivae normal.   Cardiovascular:      Rate and Rhythm: Normal rate and regular rhythm. Heart sounds: Normal heart sounds. No murmur heard. Pulmonary:      Effort: Pulmonary effort is normal.      Breath sounds: Normal breath sounds. Musculoskeletal:      Cervical back: Normal range of motion. Skin:     General: Skin is warm and dry. Neurological:      Mental Status: She is alert and oriented to person, place, and time. Motor: No weakness or tremor. Coordination: Coordination is intact. Psychiatric:         Attention and Perception: Attention and perception normal.         Mood and Affect: Mood normal.         Speech: Speech normal.         Behavior: Behavior normal. Behavior is cooperative. Thought Content:  Thought content normal.         Cognition and Memory: Cognition and memory normal.         Judgment: Judgment normal.        BP (!) 146/84 (Site: Left Upper Arm, Position: Sitting, Cuff Size: Medium Adult)   Pulse 65   Temp 97.8 °F (36.6 °C) (Temporal)   Wt 166 lb (75.3 kg)   SpO2 97%   BMI 31.37 kg/m²      ASSESSMENT:      ICD-10-CM    1. Acute sinusitis, recurrence not specified, unspecified location  J01.90 amoxicillin-clavulanate (AUGMENTIN) 875-125 MG per tablet   2. Essential hypertension  I10 valsartan (DIOVAN) 80 MG tablet       PLAN:    Michael Fong: Hypertension (has been 188/90 this morning at home, machine check in office at 154/84)  return in one week     Diagnosis and orders for this visit are above.

## 2022-03-14 NOTE — TELEPHONE ENCOUNTER
Received call from [facility] with Red Flag Complaint. Subjective: Caller states \"htn\"     Current Symptoms: htn seen pcp last week and meds adjusted some sob has not missed any medications has occas dizziness states has skipped beats at times hx htn and bradycardia checked bp 164/95 and 174/85 no h/a or blurred vision, no cp    Onset:  1 month    Associated Symptoms: NA    Pain Severity: none    Temperature: none     What has been tried: bp meds    LMP: NA Pregnant: NA    Recommended disposition: See PCP within 3 Days    Care advice provided, patient verbalizes understanding; denies any other questions or concerns; instructed to call back for any new or worsening symptoms. Patient/Caller agrees with recommended disposition; writer provided warm transfer to [facility] for appointment scheduling     Attention Provider: Thank you for allowing me to participate in the care of your patient. The patient was connected to triage in response to information provided to the ECC/PSC. Please do not respond through this encounter as the response is not directed to a shared pool.           Reason for Disposition   Systolic BP >= 185 OR Diastolic >= 753    Protocols used: BLOOD PRESSURE - HIGH-ADULT-OH

## 2022-03-15 ASSESSMENT — ENCOUNTER SYMPTOMS
SINUS PRESSURE: 1
FACIAL SWELLING: 0
SHORTNESS OF BREATH: 0
COUGH: 0

## 2022-03-21 ENCOUNTER — OFFICE VISIT (OUTPATIENT)
Dept: FAMILY MEDICINE CLINIC | Age: 77
End: 2022-03-21
Payer: MEDICARE

## 2022-03-21 ENCOUNTER — OFFICE VISIT (OUTPATIENT)
Dept: CARDIOLOGY CLINIC | Age: 77
End: 2022-03-21
Payer: MEDICARE

## 2022-03-21 VITALS
DIASTOLIC BLOOD PRESSURE: 70 MMHG | BODY MASS INDEX: 30.8 KG/M2 | TEMPERATURE: 97.8 F | HEART RATE: 58 BPM | RESPIRATION RATE: 20 BRPM | WEIGHT: 163 LBS | SYSTOLIC BLOOD PRESSURE: 128 MMHG

## 2022-03-21 VITALS
WEIGHT: 163 LBS | HEIGHT: 61 IN | SYSTOLIC BLOOD PRESSURE: 128 MMHG | HEART RATE: 60 BPM | BODY MASS INDEX: 30.78 KG/M2 | DIASTOLIC BLOOD PRESSURE: 70 MMHG

## 2022-03-21 DIAGNOSIS — I10 ESSENTIAL HYPERTENSION: Primary | ICD-10-CM

## 2022-03-21 DIAGNOSIS — R42 VERTIGO: ICD-10-CM

## 2022-03-21 DIAGNOSIS — E78.2 MODERATE MIXED HYPERLIPIDEMIA NOT REQUIRING STATIN THERAPY: ICD-10-CM

## 2022-03-21 DIAGNOSIS — I47.1 PAROXYSMAL SVT (SUPRAVENTRICULAR TACHYCARDIA) (HCC): ICD-10-CM

## 2022-03-21 DIAGNOSIS — I25.10 CORONARY ARTERY DISEASE, NON-OCCLUSIVE: ICD-10-CM

## 2022-03-21 DIAGNOSIS — R00.1 SYMPTOMATIC BRADYCARDIA: ICD-10-CM

## 2022-03-21 PROCEDURE — 99213 OFFICE O/P EST LOW 20 MIN: CPT | Performed by: CLINICAL NURSE SPECIALIST

## 2022-03-21 PROCEDURE — G8427 DOCREV CUR MEDS BY ELIG CLIN: HCPCS | Performed by: NURSE PRACTITIONER

## 2022-03-21 PROCEDURE — G8417 CALC BMI ABV UP PARAM F/U: HCPCS | Performed by: NURSE PRACTITIONER

## 2022-03-21 PROCEDURE — G8484 FLU IMMUNIZE NO ADMIN: HCPCS | Performed by: NURSE PRACTITIONER

## 2022-03-21 PROCEDURE — 1123F ACP DISCUSS/DSCN MKR DOCD: CPT | Performed by: NURSE PRACTITIONER

## 2022-03-21 PROCEDURE — 4040F PNEUMOC VAC/ADMIN/RCVD: CPT | Performed by: CLINICAL NURSE SPECIALIST

## 2022-03-21 PROCEDURE — 1123F ACP DISCUSS/DSCN MKR DOCD: CPT | Performed by: CLINICAL NURSE SPECIALIST

## 2022-03-21 PROCEDURE — 4040F PNEUMOC VAC/ADMIN/RCVD: CPT | Performed by: NURSE PRACTITIONER

## 2022-03-21 PROCEDURE — G8484 FLU IMMUNIZE NO ADMIN: HCPCS | Performed by: CLINICAL NURSE SPECIALIST

## 2022-03-21 PROCEDURE — G8399 PT W/DXA RESULTS DOCUMENT: HCPCS | Performed by: CLINICAL NURSE SPECIALIST

## 2022-03-21 PROCEDURE — 99213 OFFICE O/P EST LOW 20 MIN: CPT | Performed by: NURSE PRACTITIONER

## 2022-03-21 PROCEDURE — G8427 DOCREV CUR MEDS BY ELIG CLIN: HCPCS | Performed by: CLINICAL NURSE SPECIALIST

## 2022-03-21 PROCEDURE — 1090F PRES/ABSN URINE INCON ASSESS: CPT | Performed by: NURSE PRACTITIONER

## 2022-03-21 PROCEDURE — 1036F TOBACCO NON-USER: CPT | Performed by: CLINICAL NURSE SPECIALIST

## 2022-03-21 PROCEDURE — G8399 PT W/DXA RESULTS DOCUMENT: HCPCS | Performed by: NURSE PRACTITIONER

## 2022-03-21 PROCEDURE — 1036F TOBACCO NON-USER: CPT | Performed by: NURSE PRACTITIONER

## 2022-03-21 PROCEDURE — G8417 CALC BMI ABV UP PARAM F/U: HCPCS | Performed by: CLINICAL NURSE SPECIALIST

## 2022-03-21 PROCEDURE — 1090F PRES/ABSN URINE INCON ASSESS: CPT | Performed by: CLINICAL NURSE SPECIALIST

## 2022-03-21 RX ORDER — ATORVASTATIN CALCIUM 40 MG/1
40 TABLET, FILM COATED ORAL DAILY
Qty: 90 TABLET | Refills: 1 | Status: SHIPPED | OUTPATIENT
Start: 2022-03-21

## 2022-03-21 RX ORDER — MECLIZINE HYDROCHLORIDE 25 MG/1
25 TABLET ORAL 3 TIMES DAILY PRN
Qty: 30 TABLET | Refills: 0 | Status: SHIPPED | OUTPATIENT
Start: 2022-03-21 | End: 2022-03-31

## 2022-03-21 ASSESSMENT — ENCOUNTER SYMPTOMS
COUGH: 0
FACIAL SWELLING: 0
ABDOMINAL PAIN: 0
NAUSEA: 0
VOMITING: 0
EYE REDNESS: 0
SHORTNESS OF BREATH: 0
BACK PAIN: 0
CHEST TIGHTNESS: 0
COUGH: 0
WHEEZING: 0
SHORTNESS OF BREATH: 0

## 2022-03-21 NOTE — PROGRESS NOTES
[facility] [address]  [id number]  Phone: [phone]  Fax: [phone]    OFFICE VISIT:  3/21/2022    [address]: 1945    Reason For Visit:  Blanco Stuart is a 68 y.o. female who is here for 6 Month Follow-Up (pt having issues with htn), Palpitations, Hypertension, and Atrial Fibrillation       Diagnosis Orders   1. Essential hypertension     2. Symptomatic bradycardia     3. Paroxysmal SVT (supraventricular tachycardia) ([facility] [address]     4. Coronary artery disease, non-occlusive     5. Vertigo           HPI  Patient was initially seen in our office with complaints of palpitations and brief SVT on heart monitor in 2019, nonobstructive CAD, vertigo    She presented to the hospital 5/6/2021 with dizziness found to be bradycardic and hypotensive. Antihypertensives and beta-blocker were discontinued. 2D echo shows normal LV size and function with EF 55 to 60%. Mild concentric LVH. Mild MR and mildly dilated LA.     Heart catheterization 5/6/2021 showed nonobstructive coronary disease with ostial RCA approximately 55% stenosis. Mid circumflex 20% stenosis and LAD moderate to severe distal vessel tortuosity    Patient denies chest pain or dyspnea. No issues lightheadedness, syncope. Most recently she has been having problems with hypertension and has been following with her PCP. Her valsartan has been adjusted and she is doing well. Blood pressure is well controlled today. She has issues with vertigo and uses meclizine periodically. She has some rare, brief palpitations that may last less than 30 seconds       NEO Gipson is PCP.   Jameson Billingsley has the following history as recorded in [employer]:    Patient Active Problem List    Diagnosis Date Noted    Vertigo 06/01/2021    Symptomatic bradycardia 05/06/2021    Essential hypertension 02/27/2019    Hyperlipidemia 08/17/2018    Abnormal urination 08/17/2018    Female stress incontinence 2017    Prolapse of vaginal vault after hysterectomy 2017    Rectocele 2017    Enterocele 2017    Cystocele, midline 2017     Past Medical History:   Diagnosis Date    Arthritis     Atrial fibrillation ([address]     Carpal tunnel syndrome     x2 right hand    Depression     History of blood clots     behind left knee    History of vein stripping     left leg    Hyperlipidemia     Hypertension     Neurofibromatosis ([address]     Polio     as a child    Tachycardia      Past Surgical History:   Procedure Laterality Date    ANKLE SURGERY Left     3 surgeries    APPENDECTOMY      BLADDER SURGERY      x 2    CARDIAC CATHETERIZATION  2021    Cx 20%.  RCA 55%    CARPAL TUNNEL RELEASE Right     COLONOSCOPY  2019    Dr [employer]-Retained stool, TVA, (-) dysplasia x 1, HP x 1, 3 yr recall    HERNIA REPAIR      HYSTERECTOMY      MASTECTOMY, BILATERAL Bilateral     2010 had implants redone    SINUS SURGERY      UPPER GASTROINTESTINAL ENDOSCOPY  2019    Dr [employer]-Duodenitis, gastric antral ulcer, gastritis, repeat in 3 months    UPPER GASTROINTESTINAL ENDOSCOPY N/A 2020    Dr Lance Alcantar-Esophagitis, gastritis, well healed gastric ulceration      Family History   Problem Relation Age of Onset    Hypertension Father     Stroke Father          age 80    Brain Cancer Mother     Colon Cancer Neg Hx     Colon Polyps Neg Hx      Social History     Tobacco Use    Smoking status: Former Smoker     Packs/day: 1.00     Years: 24.00     Pack years: 24.00     Types: Cigarettes     Quit date:      Years since quittin.2    Smokeless tobacco: Never Used   Substance Use Topics    Alcohol use: Yes     Comment: Occasional.      Current Outpatient Medications   Medication Sig Dispense Refill    amoxicillin-clavulanate (AUGMENTIN) 875-125 MG per tablet Take 1 tablet by mouth 2 times daily for 10 days 20 tablet 0    valsartan (DIOVAN) 80 MG tablet . 1 tablet in am and .5 tablet in the evening (Patient taking differently: 80 mg daily ) 60 tablet 0    HYDROcodone-acetaminophen (NORCO) 7.5-325 MG per tablet Take 1 tablet by mouth every 6 hours as needed for Pain.  escitalopram (LEXAPRO) 20 MG tablet Take 1 tablet by mouth daily 90 tablet 1    tiZANidine (ZANAFLEX) 4 MG tablet Take 1 tablet by mouth every 6 hours as needed (back pain) 90 tablet 1    atorvastatin (LIPITOR) 20 MG tablet TAKE 1 TABLET BY MOUTH DAILY (Patient taking differently: Take 20 mg by mouth daily Takes 2 a day) 30 tablet 1    omeprazole 20 MG EC tablet Take 20 mg by mouth daily      Cholecalciferol (VITAMIN D3) 2000 units CAPS Take 2,000 Units by mouth       No current facility-administered medications for this visit. Allergies: Patient has no known allergies. Review of Systems  Review of Systems   Constitutional: Negative for activity change, diaphoresis, fatigue, fever and unexpected weight change. HENT: Negative for facial swelling and nosebleeds. Eyes: Negative for redness and visual disturbance. Respiratory: Negative for cough, chest tightness, shortness of breath and wheezing. Cardiovascular: Positive for palpitations (rare, brief). Negative for chest pain and leg swelling. Gastrointestinal: Negative for abdominal pain, nausea and vomiting. Endocrine: Negative for cold intolerance and heat intolerance. Genitourinary: Negative for dysuria and hematuria. Musculoskeletal: Negative for arthralgias and myalgias. Skin: Negative for pallor and rash. Neurological: Positive for dizziness (has vertigo). Negative for seizures, syncope, weakness and light-headedness. Hematological: Does not bruise/bleed easily. Psychiatric/Behavioral: Negative for agitation. The patient is not nervous/anxious.         Objective  Vital Signs - /70   Pulse 60   Ht 5' 1\" (1.549 m)   Wt 163 lb (73.9 kg)   BMI 30.80 kg/m²   Physical 03/08/2022    GLUCOSE 72 03/08/2022    BUN 17 03/08/2022    CREATININE 0.8 03/08/2022    CALCIUM 9.1 03/08/2022    ALT 17 03/08/2022    AST 14 03/08/2022     Lab Results   Component Value Date    TSH 0.884 03/08/2022     Heart Cath 5/11/21  Conclusions      Nonobstructive CAD with ostial RCA intermediate grade stenosis. Normal LV systolic function. Recommendations      Medical management. Signatures      ----------------------------------------------------------------   Electronically signed by Dae Yan MD(Performing Physician) on   05/11/2021 15:10    Echo 5/7//21  Conclusions      Summary   Mitral valve leaflets are mildly thickened with preserved leaflet   mobility. Mild mitral regurgitation is present. Mildly thickened aortic valve leaflets with preserved leaflet mobility. Tricuspid valve is structurally normal.   Mild tricuspid regurgitation with estimated RVSP of 44 mm Hg. Mildly dilated left atrium. Normal left ventricular size with preserved LV function and an estimated   ejection fraction of approximately 55-60%. Impaired relaxation compatible with diastolic dysfunction. ( reversed E/A   ratio)   Mild concentric left ventricular hypertrophy. No regional wall motion abnormalities. Signature      ----------------------------------------------------------------   Electronically signed by Mylo Heimlich MD(Interpreting   physician) on 05/08/2021 10:02 AM    Zio patch heart monitor 6/15/2021  Sinus   9 brief SVT, longest 13 beats  No significant bradycardia, pauses or heart block  No atrial fibrillation    Zio patch heart monitor 3/11/2019  Worn 4 days and 12 hours  Predominantly sinus rhythm  No significant bradycardia, pauses or heart block  6 SVT, longest 9 beats    Reviewed EKG that shows sinus bradycardia rate 59    Assessment:     Diagnosis Orders   1. Essential hypertension     2. Symptomatic bradycardia     3. Paroxysmal SVT (supraventricular tachycardia) ([address]     4. Coronary artery disease, non-occlusive     5. Vertigo          Hypertensionrecent problems, but stable today on current dosage of valsartan. Continue to monitor, continue present treatment    Symptomatic bradycardiawith hospitalization in May 2021. Beta-blocker was discontinued. Zio patch heart monitor showed no significant pauses, heart block or arrhythmias. Monitor for signs of unusual dizziness, lightheadedness or syncope. Patient reports heart rate running 50-70 at home    Paroxysmal SVTstable, brief, rare  palpitations, not symptomatic. Continue to monitor    CADnonocclusive per heart cath in May 2021. Continue atorvastatin    Vertigo-chronic, ongoing, uses meclizine as needed    Stable cardiovascular status. No evidence of overt heart failure,angina or dysrhythmia. Plan    Return in about 6 months (around 9/21/2022) for NEO. Call for unusual dizziness, weakness, passing out spells    Call with any questionsor concerns  Follow up with NEO Sosa for non cardiac problems  Report any new problems  Cardiovascular Fitness-Exercise as tolerated. Strive for 15 minutes of exercise most days of the week. Cardiac / HealthyDiet  Continue current medications as directed  Continue plan of treatment  It is always recommended that you bring your medicationsbottles with you to each visit - this is for your safety!        NEO Kerr

## 2022-03-21 NOTE — PROGRESS NOTES
SUBJECTIVE:  HTN:   Patient ID: Quan Krishna is a 68 y.o. female. HPI:   HPI   Bp is in control at Office visit   Past Medical History:   Diagnosis Date    Arthritis     [facility] [address]     Carpal tunnel syndrome     x2 right hand    Depression     History of blood clots     behind left knee    History of vein stripping     left leg    Hyperlipidemia     Hypertension     Neurofibromatosis ([facility] [address]     Polio     as a child    Tachycardia       Prior to Visit Medications    Medication Sig Taking? Authorizing Provider   amoxicillin-clavulanate (AUGMENTIN) 875-125 MG per tablet Take 1 tablet by mouth 2 times daily for 10 days Yes NEO Mcdaniel   valsartan (DIOVAN) 80 MG tablet . 1 tablet in am and .5 tablet in the evening  Patient taking differently: 80 mg daily  Yes NEO Mcdaniel   HYDROcodone-acetaminophen (NORCO) 7.5-325 MG per tablet Take 1 tablet by mouth every 6 hours as needed for Pain. Yes Historical Provider, MD   escitalopram (LEXAPRO) 20 MG tablet Take 1 tablet by mouth daily Yes NEO Mcdaniel   tiZANidine (ZANAFLEX) 4 MG tablet Take 1 tablet by mouth every 6 hours as needed (back pain) Yes NEO Mcdaniel   atorvastatin (LIPITOR) 20 MG tablet TAKE 1 TABLET BY MOUTH DAILY  Patient taking differently: Take 20 mg by mouth daily Takes 2 a day Yes NEO Mcdaniel   omeprazole 20 MG EC tablet Take 20 mg by mouth daily Yes Historical Provider, MD   Cholecalciferol (VITAMIN D3) 2000 units CAPS Take 2,000 Units by mouth Yes Historical Provider, MD      No Known Allergies    Review of Systems    OBJECTIVE:    Physical Exam   /70 (Site: Left Upper Arm, Position: Sitting, Cuff Size: Medium Adult)   Pulse 58   Temp 97.8 °F (36.6 °C) (Temporal)   Resp 20   Wt 163 lb (73.9 kg)   BMI 30.80 kg/m²      ASSESSMENT:    No diagnosis found. PLAN:    Quan Krishna: Hypertension (follow up hypertension . she has brought her BP log in with her .  BP is improving some . she saw cardiology this morning )      Diagnosis and orders for this visit are above.

## 2022-03-21 NOTE — PROGRESS NOTES
SUBJECTIVE:  Blood pressure follow-up  Patient ID: Diamond Sherman is a 68 y.o. female. HPI:   HPI   Had gained weight and has now got it back off States around 11 lbs   States she is not taking the med at night due to too low BP with reading of systolic 985. Taking 80 mg in the morning but the Diovan she is not taking the medication at night she tends to alter her medications a little    Taking Zanalfex for back pain. Has not taken lately doing well. Mood is better back on med. Not crying as much    She also feels like the vertigo might be coming back just a little bit she was treated with physical therapy a while back and this time she wants to keep some on hand at the meclizine in case it creates more of a problem    She has been to see the cardiologist today and said that that went well she is not to follow-up for 6 months  Past Medical History:   Diagnosis Date    Arthritis     Atrial fibrillation ([facility] [address]     Carpal tunnel syndrome     x2 right hand    Depression     History of blood clots     behind left knee    History of vein stripping     left leg    Hyperlipidemia     Hypertension     Neurofibromatosis ([facility] [address]     Polio     as a child    Tachycardia       Prior to Visit Medications    Medication Sig Taking? Authorizing Provider   atorvastatin (LIPITOR) 40 MG tablet Take 1 tablet by mouth daily Yes NEO Gottlieb   meclizine (ANTIVERT) 25 MG tablet Take 1 tablet by mouth 3 times daily as needed for Dizziness Yes NEO Gottlieb   amoxicillin-clavulanate (AUGMENTIN) 875-125 MG per tablet Take 1 tablet by mouth 2 times daily for 10 days Yes NEO Gottlieb   valsartan (DIOVAN) 80 MG tablet . 1 tablet in am and .5 tablet in the evening  Patient taking differently: 80 mg daily  Yes NEO Gottlieb   HYDROcodone-acetaminophen (NORCO) 7.5-325 MG per tablet Take 1 tablet by mouth every 6 hours as needed for Pain.  Yes Historical Provider, MD   escitalopram (Pradeep Celaya) 20 MG tablet Take 1 tablet by mouth daily Yes Orlie Co, APRN   tiZANidine (ZANAFLEX) 4 MG tablet Take 1 tablet by mouth every 6 hours as needed (back pain) Yes Orlie Co, APRN   omeprazole 20 MG EC tablet Take 20 mg by mouth daily Yes Historical Provider, MD   Cholecalciferol (VITAMIN D3) 2000 units CAPS Take 2,000 Units by mouth Yes Historical Provider, MD      No Known Allergies    Review of Systems   Constitutional: Negative for fever and unexpected weight change. Respiratory: Negative for cough and shortness of breath. Cardiovascular: Negative for chest pain, palpitations and leg swelling. Musculoskeletal: Negative for arthralgias and back pain. Neurological: Positive for dizziness. Negative for headaches. OBJECTIVE:    Physical Exam  Constitutional:       Appearance: Normal appearance. She is well-developed. She is not ill-appearing. HENT:      Head: Normocephalic and atraumatic. Right Ear: External ear normal.      Left Ear: External ear normal.      Nose: Nose normal.      Mouth/Throat:      Lips: Pink. Mouth: Mucous membranes are moist.   Eyes:      General: Lids are normal.      Extraocular Movements: Extraocular movements intact. Conjunctiva/sclera: Conjunctivae normal.   Cardiovascular:      Rate and Rhythm: Normal rate and regular rhythm. Heart sounds: Normal heart sounds. No murmur heard. Pulmonary:      Effort: Pulmonary effort is normal.      Breath sounds: Normal breath sounds. Musculoskeletal:      Cervical back: Normal range of motion. Skin:     General: Skin is warm and dry. Neurological:      Mental Status: She is alert and oriented to person, place, and time. Motor: No weakness or tremor. Coordination: Coordination is intact.    Psychiatric:         Attention and Perception: Attention and perception normal.         Mood and Affect: Mood normal.         Speech: Speech normal.         Behavior: Behavior normal. Behavior is cooperative. Thought Content: Thought content normal.         Cognition and Memory: Cognition and memory normal.         Judgment: Judgment normal.        /70 (Site: Left Upper Arm, Position: Sitting, Cuff Size: Medium Adult)   Pulse 58   Temp 97.8 °F (36.6 °C) (Temporal)   Resp 20   Wt 163 lb (73.9 kg)   BMI 30.80 kg/m²      ASSESSMENT:      ICD-10-CM    1. Essential hypertension  I10    2. Vertigo  R42 meclizine (ANTIVERT) 25 MG tablet   3. Moderate mixed hyperlipidemia not requiring statin therapy  E78.2 atorvastatin (LIPITOR) 40 MG tablet       PLAN:    Quan Tomi: Hypertension (follow up hypertension . she has brought her BP log in with her . BP is improving some . she saw cardiology this morning )  RTc in 6 months     Diagnosis and orders for this visit are above.

## 2022-03-21 NOTE — PATIENT INSTRUCTIONS
Return in about 6 months (around 9/21/2022) for APRN.    Call for unusual dizziness, weakness, passing out spells

## 2022-04-21 DIAGNOSIS — G89.29 CHRONIC BILATERAL LOW BACK PAIN WITHOUT SCIATICA: ICD-10-CM

## 2022-04-21 DIAGNOSIS — M54.50 CHRONIC BILATERAL LOW BACK PAIN WITHOUT SCIATICA: ICD-10-CM

## 2022-04-28 DIAGNOSIS — G89.29 CHRONIC BILATERAL LOW BACK PAIN WITHOUT SCIATICA: ICD-10-CM

## 2022-04-28 DIAGNOSIS — M54.50 CHRONIC BILATERAL LOW BACK PAIN WITHOUT SCIATICA: ICD-10-CM

## 2022-04-28 RX ORDER — TIZANIDINE 4 MG/1
TABLET ORAL
Qty: 90 TABLET | Refills: 1 | Status: SHIPPED | OUTPATIENT
Start: 2022-04-28

## 2022-04-28 RX ORDER — TIZANIDINE 4 MG/1
TABLET ORAL
Qty: 90 TABLET | Refills: 1 | Status: SHIPPED | OUTPATIENT
Start: 2022-04-28 | End: 2022-04-28 | Stop reason: SDUPTHER

## 2022-07-06 DIAGNOSIS — F33.1 MODERATE EPISODE OF RECURRENT MAJOR DEPRESSIVE DISORDER (HCC): ICD-10-CM

## 2022-07-07 RX ORDER — ESCITALOPRAM OXALATE 20 MG/1
20 TABLET ORAL DAILY
Qty: 90 TABLET | Refills: 0 | Status: SHIPPED | OUTPATIENT
Start: 2022-07-07 | End: 2022-09-26 | Stop reason: ALTCHOICE

## 2022-09-03 DIAGNOSIS — I10 ESSENTIAL HYPERTENSION: ICD-10-CM

## 2022-09-06 DIAGNOSIS — I10 ESSENTIAL HYPERTENSION: ICD-10-CM

## 2022-09-06 RX ORDER — VALSARTAN 40 MG/1
TABLET ORAL
Qty: 60 TABLET | Refills: 0 | Status: SHIPPED | OUTPATIENT
Start: 2022-09-06 | End: 2022-09-08

## 2022-09-06 NOTE — TELEPHONE ENCOUNTER
Alexx Zavala called to request a refill on her medication.       Last office visit : 3/21/2022   Next office visit : 9/26/2022     Requested Prescriptions     Pending Prescriptions Disp Refills    valsartan (DIOVAN) 40 MG tablet [Pharmacy Med Name: VALSARTAN 40MG TABLETS] 180 tablet 1     Sig: TAKE 1 TABLET BY MOUTH TWICE DAILY            Markel Amaya

## 2022-09-07 NOTE — TELEPHONE ENCOUNTER
Maverick Burt called to request a refill on her medication.       Last office visit : 3/21/2022   Next office visit : 9/26/2022     Requested Prescriptions     Pending Prescriptions Disp Refills    valsartan (DIOVAN) 40 MG tablet [Pharmacy Med Name: VALSARTAN 40MG TABLETS] 180 tablet      Sig: TAKE 1 TABLET BY MOUTH TWICE DAILY            Mecca Brown

## 2022-09-08 DIAGNOSIS — I10 ESSENTIAL HYPERTENSION: ICD-10-CM

## 2022-09-08 RX ORDER — VALSARTAN 40 MG/1
TABLET ORAL
Qty: 180 TABLET | OUTPATIENT
Start: 2022-09-08

## 2022-09-08 RX ORDER — VALSARTAN 40 MG/1
TABLET ORAL
Qty: 60 TABLET | Refills: 0 | Status: SHIPPED | OUTPATIENT
Start: 2022-09-08 | End: 2022-11-09

## 2022-09-23 ENCOUNTER — TELEPHONE (OUTPATIENT)
Dept: CARDIOLOGY CLINIC | Age: 77
End: 2022-09-23

## 2022-09-26 ENCOUNTER — OFFICE VISIT (OUTPATIENT)
Dept: FAMILY MEDICINE CLINIC | Age: 77
End: 2022-09-26
Payer: MEDICARE

## 2022-09-26 ENCOUNTER — OFFICE VISIT (OUTPATIENT)
Dept: CARDIOLOGY CLINIC | Age: 77
End: 2022-09-26
Payer: MEDICARE

## 2022-09-26 VITALS
BODY MASS INDEX: 30.04 KG/M2 | WEIGHT: 159 LBS | HEART RATE: 66 BPM | OXYGEN SATURATION: 98 % | TEMPERATURE: 97.1 F | RESPIRATION RATE: 20 BRPM | SYSTOLIC BLOOD PRESSURE: 132 MMHG | DIASTOLIC BLOOD PRESSURE: 80 MMHG

## 2022-09-26 VITALS
WEIGHT: 159 LBS | OXYGEN SATURATION: 98 % | DIASTOLIC BLOOD PRESSURE: 80 MMHG | BODY MASS INDEX: 30.02 KG/M2 | HEIGHT: 61 IN | SYSTOLIC BLOOD PRESSURE: 132 MMHG | HEART RATE: 66 BPM

## 2022-09-26 DIAGNOSIS — I47.1 PAROXYSMAL SVT (SUPRAVENTRICULAR TACHYCARDIA) (HCC): ICD-10-CM

## 2022-09-26 DIAGNOSIS — E78.5 HYPERLIPIDEMIA, UNSPECIFIED HYPERLIPIDEMIA TYPE: ICD-10-CM

## 2022-09-26 DIAGNOSIS — Z87.11 HISTORY OF STOMACH ULCERS: ICD-10-CM

## 2022-09-26 DIAGNOSIS — R00.1 SYMPTOMATIC BRADYCARDIA: Primary | ICD-10-CM

## 2022-09-26 DIAGNOSIS — N32.81 OVERACTIVE BLADDER: ICD-10-CM

## 2022-09-26 DIAGNOSIS — Z87.11 HISTORY OF STOMACH ULCERS: Primary | ICD-10-CM

## 2022-09-26 DIAGNOSIS — I10 ESSENTIAL HYPERTENSION: ICD-10-CM

## 2022-09-26 DIAGNOSIS — I25.10 CORONARY ARTERY DISEASE, NON-OCCLUSIVE: ICD-10-CM

## 2022-09-26 LAB
ALBUMIN SERPL-MCNC: 3.6 G/DL (ref 3.5–5.2)
ALP BLD-CCNC: 95 U/L (ref 35–104)
ALT SERPL-CCNC: 12 U/L (ref 5–33)
ANION GAP SERPL CALCULATED.3IONS-SCNC: 16 MMOL/L (ref 7–19)
AST SERPL-CCNC: 18 U/L (ref 5–32)
BILIRUB SERPL-MCNC: 0.8 MG/DL (ref 0.2–1.2)
BUN BLDV-MCNC: 18 MG/DL (ref 8–23)
CALCIUM SERPL-MCNC: 9.1 MG/DL (ref 8.8–10.2)
CHLORIDE BLD-SCNC: 104 MMOL/L (ref 98–111)
CHOLESTEROL, FASTING: 212 MG/DL (ref 160–199)
CO2: 17 MMOL/L (ref 22–29)
CREAT SERPL-MCNC: 0.8 MG/DL (ref 0.5–0.9)
GFR AFRICAN AMERICAN: >59
GFR NON-AFRICAN AMERICAN: >60
GLUCOSE BLD-MCNC: 83 MG/DL (ref 74–109)
HDLC SERPL-MCNC: 49 MG/DL (ref 65–121)
LDL CHOLESTEROL CALCULATED: 129 MG/DL
POTASSIUM SERPL-SCNC: 4.7 MMOL/L (ref 3.5–5)
SODIUM BLD-SCNC: 137 MMOL/L (ref 136–145)
TOTAL PROTEIN: 7 G/DL (ref 6.6–8.7)
TRIGLYCERIDE, FASTING: 168 MG/DL (ref 0–149)

## 2022-09-26 PROCEDURE — G8417 CALC BMI ABV UP PARAM F/U: HCPCS | Performed by: NURSE PRACTITIONER

## 2022-09-26 PROCEDURE — G8427 DOCREV CUR MEDS BY ELIG CLIN: HCPCS | Performed by: CLINICAL NURSE SPECIALIST

## 2022-09-26 PROCEDURE — 1123F ACP DISCUSS/DSCN MKR DOCD: CPT | Performed by: CLINICAL NURSE SPECIALIST

## 2022-09-26 PROCEDURE — 1090F PRES/ABSN URINE INCON ASSESS: CPT | Performed by: NURSE PRACTITIONER

## 2022-09-26 PROCEDURE — 99214 OFFICE O/P EST MOD 30 MIN: CPT | Performed by: NURSE PRACTITIONER

## 2022-09-26 PROCEDURE — 1123F ACP DISCUSS/DSCN MKR DOCD: CPT | Performed by: NURSE PRACTITIONER

## 2022-09-26 PROCEDURE — G8427 DOCREV CUR MEDS BY ELIG CLIN: HCPCS | Performed by: NURSE PRACTITIONER

## 2022-09-26 PROCEDURE — G8399 PT W/DXA RESULTS DOCUMENT: HCPCS | Performed by: NURSE PRACTITIONER

## 2022-09-26 PROCEDURE — 1036F TOBACCO NON-USER: CPT | Performed by: NURSE PRACTITIONER

## 2022-09-26 PROCEDURE — G8417 CALC BMI ABV UP PARAM F/U: HCPCS | Performed by: CLINICAL NURSE SPECIALIST

## 2022-09-26 PROCEDURE — 99214 OFFICE O/P EST MOD 30 MIN: CPT | Performed by: CLINICAL NURSE SPECIALIST

## 2022-09-26 PROCEDURE — 1090F PRES/ABSN URINE INCON ASSESS: CPT | Performed by: CLINICAL NURSE SPECIALIST

## 2022-09-26 PROCEDURE — 1036F TOBACCO NON-USER: CPT | Performed by: CLINICAL NURSE SPECIALIST

## 2022-09-26 PROCEDURE — G8399 PT W/DXA RESULTS DOCUMENT: HCPCS | Performed by: CLINICAL NURSE SPECIALIST

## 2022-09-26 RX ORDER — ESOMEPRAZOLE MAGNESIUM 40 MG/1
40 CAPSULE, DELAYED RELEASE ORAL
Qty: 30 CAPSULE | Refills: 5 | Status: SHIPPED | OUTPATIENT
Start: 2022-09-26

## 2022-09-26 RX ORDER — TOLTERODINE 2 MG/1
2 CAPSULE, EXTENDED RELEASE ORAL DAILY
Qty: 30 CAPSULE | Refills: 1 | Status: SHIPPED | OUTPATIENT
Start: 2022-09-26 | End: 2022-09-27

## 2022-09-26 SDOH — ECONOMIC STABILITY: FOOD INSECURITY: WITHIN THE PAST 12 MONTHS, YOU WORRIED THAT YOUR FOOD WOULD RUN OUT BEFORE YOU GOT MONEY TO BUY MORE.: NEVER TRUE

## 2022-09-26 SDOH — ECONOMIC STABILITY: FOOD INSECURITY: WITHIN THE PAST 12 MONTHS, THE FOOD YOU BOUGHT JUST DIDN'T LAST AND YOU DIDN'T HAVE MONEY TO GET MORE.: NEVER TRUE

## 2022-09-26 ASSESSMENT — PATIENT HEALTH QUESTIONNAIRE - PHQ9
3. TROUBLE FALLING OR STAYING ASLEEP: 0
SUM OF ALL RESPONSES TO PHQ9 QUESTIONS 1 & 2: 0
6. FEELING BAD ABOUT YOURSELF - OR THAT YOU ARE A FAILURE OR HAVE LET YOURSELF OR YOUR FAMILY DOWN: 1
SUM OF ALL RESPONSES TO PHQ QUESTIONS 1-9: 2
5. POOR APPETITE OR OVEREATING: 0
10. IF YOU CHECKED OFF ANY PROBLEMS, HOW DIFFICULT HAVE THESE PROBLEMS MADE IT FOR YOU TO DO YOUR WORK, TAKE CARE OF THINGS AT HOME, OR GET ALONG WITH OTHER PEOPLE: 0
SUM OF ALL RESPONSES TO PHQ QUESTIONS 1-9: 2
9. THOUGHTS THAT YOU WOULD BE BETTER OFF DEAD, OR OF HURTING YOURSELF: 0
SUM OF ALL RESPONSES TO PHQ QUESTIONS 1-9: 2
4. FEELING TIRED OR HAVING LITTLE ENERGY: 0
8. MOVING OR SPEAKING SO SLOWLY THAT OTHER PEOPLE COULD HAVE NOTICED. OR THE OPPOSITE, BEING SO FIGETY OR RESTLESS THAT YOU HAVE BEEN MOVING AROUND A LOT MORE THAN USUAL: 0
1. LITTLE INTEREST OR PLEASURE IN DOING THINGS: 0
2. FEELING DOWN, DEPRESSED OR HOPELESS: 0
SUM OF ALL RESPONSES TO PHQ QUESTIONS 1-9: 2
7. TROUBLE CONCENTRATING ON THINGS, SUCH AS READING THE NEWSPAPER OR WATCHING TELEVISION: 1

## 2022-09-26 ASSESSMENT — ENCOUNTER SYMPTOMS
NAUSEA: 0
COUGH: 0
CHEST TIGHTNESS: 0
RHINORRHEA: 1
EYE REDNESS: 0
WHEEZING: 0
SHORTNESS OF BREATH: 0
COUGH: 0
FACIAL SWELLING: 0
SHORTNESS OF BREATH: 0
VOMITING: 0
ABDOMINAL PAIN: 0
ABDOMINAL PAIN: 1
BACK PAIN: 1

## 2022-09-26 ASSESSMENT — SOCIAL DETERMINANTS OF HEALTH (SDOH): HOW HARD IS IT FOR YOU TO PAY FOR THE VERY BASICS LIKE FOOD, HOUSING, MEDICAL CARE, AND HEATING?: NOT HARD AT ALL

## 2022-09-26 NOTE — PROGRESS NOTES
SUBJECTIVE:  Abdominal pain  Patient ID: Joana Lang is a 68 y.o. female. HPI:   Abdominal Pain  Associated symptoms include arthralgias and frequency. Pertinent negatives include no dysuria, fever or headaches. HTN; States Is not taking BP med consistently. States it is 120/60 but every 5 months will increase to 180. And only 2 days. Cholesterol: Only taking occasionally forgets not taking. Still wants to check cholesterol It is sitting out at night to remember Not eating fried foods. CBC last OV WBC was elevated. Stomach pains eating differently sore on the LMQ not vomiting. Taking Omeprazole. Is regularly. Also wants to Have Detrol on hand incase of overactive bladder. Past Medical History:   Diagnosis Date    Arthritis     Atrial fibrillation (HCC)     Carpal tunnel syndrome     x2 right hand    Depression     History of blood clots     behind left knee    History of vein stripping     left leg    Hyperlipidemia     Hypertension     Neurofibromatosis ([address]     Polio     as a child    Tachycardia       Prior to Visit Medications    Medication Sig Taking? Authorizing Provider   esomeprazole (NEXIUM) 40 MG delayed release capsule Take 1 capsule by mouth every morning (before breakfast) Yes NEO Quinonez   tolterodine (DETROL LA) 2 MG extended release capsule Take 1 capsule by mouth daily Yes NEO Quinonez   valsartan (DIOVAN) 40 MG tablet TAKE 1 TABLET BY MOUTH TWICE DAILY Yes NEO Quinonez   tiZANidine (ZANAFLEX) 4 MG tablet TAKE 1 TABLET BY MOUTH EVERY 6 HOURS AS NEEDED FOR BACK PAIN Yes NEO Quinonez   atorvastatin (LIPITOR) 40 MG tablet Take 1 tablet by mouth daily Yes NEO Quinonez   HYDROcodone-acetaminophen (NORCO) 7.5-325 MG per tablet Take 1 tablet by mouth every 6 hours as needed for Pain.  Yes Historical Provider, MD   omeprazole 20 MG EC tablet Take 20 mg by mouth daily Yes Historical Provider, MD   Cholecalciferol (VITAMIN D3) 2000 units CAPS Take 2,000 Units by mouth  Patient not taking: Reported on 9/26/2022  Historical Provider, MD     No Known Allergies    Review of Systems   Constitutional:  Negative for fever and unexpected weight change. HENT:  Positive for congestion and rhinorrhea. Respiratory:  Negative for cough and shortness of breath. Cardiovascular: Negative. Gastrointestinal:  Positive for abdominal pain. Genitourinary:  Positive for frequency. Negative for dysuria. Musculoskeletal:  Positive for arthralgias and back pain. Allergic/Immunologic: Positive for environmental allergies. Neurological:  Negative for dizziness and headaches. Psychiatric/Behavioral:  Negative for dysphoric mood. The patient is not nervous/anxious. OBJECTIVE:    Physical Exam  Constitutional:       Appearance: Normal appearance. She is well-developed and well-groomed. HENT:      Head: Normocephalic and atraumatic. Right Ear: Tympanic membrane, ear canal and external ear normal. There is no impacted cerumen. Left Ear: Tympanic membrane, ear canal and external ear normal. There is no impacted cerumen. Nose: Nose normal.      Mouth/Throat:      Lips: Pink. Mouth: Mucous membranes are moist.      Dentition: Normal dentition. Pharynx: Oropharynx is clear. Uvula midline. Eyes:      General: Lids are normal.         Right eye: No discharge. Left eye: No discharge. Extraocular Movements: Extraocular movements intact. Conjunctiva/sclera: Conjunctivae normal.      Right eye: Right conjunctiva is not injected. Left eye: Left conjunctiva is not injected. Pupils: Pupils are equal, round, and reactive to light. Neck:      Thyroid: No thyromegaly. Vascular: No carotid bruit or JVD. Cardiovascular:      Rate and Rhythm: Normal rate and regular rhythm. Pulses: Normal pulses. Carotid pulses are 2+ on the right side and 2+ on the left side. Radial pulses are 2+ on the right side and 2+ on the left side. Heart sounds: Normal heart sounds, S1 normal and S2 normal. No murmur heard. Pulmonary:      Effort: Pulmonary effort is normal.      Breath sounds: Normal breath sounds. Abdominal:      General: Bowel sounds are normal. There is no distension or abdominal bruit. Palpations: Abdomen is soft. There is no mass. Hernia: No hernia is present. Musculoskeletal:         General: Normal range of motion. Cervical back: Full passive range of motion without pain, normal range of motion and neck supple. Right lower leg: No edema. Left lower leg: No edema. Lymphadenopathy:      Cervical: No cervical adenopathy. Right cervical: No superficial cervical adenopathy. Left cervical: No superficial cervical adenopathy. Upper Body:      Right upper body: No supraclavicular adenopathy. Left upper body: No supraclavicular adenopathy. Skin:     General: Skin is warm and dry. Coloration: Skin is not pale. Findings: No lesion or rash. Nails: There is no clubbing. Neurological:      Mental Status: She is alert and oriented to person, place, and time. Motor: No weakness or tremor. Coordination: Coordination normal.      Deep Tendon Reflexes: Reflexes are normal and symmetric. Psychiatric:         Attention and Perception: Attention normal.         Mood and Affect: Mood normal.         Speech: Speech normal.         Behavior: Behavior normal. Behavior is cooperative. Thought Content: Thought content normal.         Cognition and Memory: Cognition and memory normal.         Judgment: Judgment normal.      /80 (Site: Left Upper Arm, Position: Sitting, Cuff Size: Medium Adult)   Pulse 66   Temp 97.1 °F (36.2 °C) (Temporal)   Resp 20   Wt 159 lb (72.1 kg)   SpO2 98%   BMI 30.04 kg/m²      ASSESSMENT:      ICD-10-CM    1.  History of stomach ulcers  Z87.11 Comprehensive Metabolic Panel     H. Pylori Antigen, Stool     CBC with Auto Differential     esomeprazole (NEXIUM) 40 MG delayed release capsule      2. Essential hypertension  I10 Comprehensive Metabolic Panel     CBC with Auto Differential      3. Hyperlipidemia, unspecified hyperlipidemia type  E78.5 Lipid, Fasting      4. Overactive bladder  N32.81 tolterodine (DETROL LA) 2 MG extended release capsule          PLAN:    Claudiaariana Negron: Check-Up (Check up today . She has just seen Angie Tellez and has a good visit . ) and Abdominal Pain (She is having stomach pain that feels like ulcers . /)      Diagnosis and orders for thisvisit are above. All patient questions answered. Pt voiced understanding. Reviewedhealth maintenance. Instructed to continue current medications, diet and exercise. Patient agreed with treatment plan. Follow up as directed.

## 2022-09-26 NOTE — PROGRESS NOTES
[facility], [address], [address] [id number]  Phone: [phone]  Fax: [phone]    OFFICE VISIT:  9/26/2022    [address]: 1945    Reason For Visit:  Matt Thomas is a 68 y.o. female who is here for 6 Month Follow-Up (Patient c/o edema in lower legs ) and Hypertension       Diagnosis Orders   1. Symptomatic bradycardia        2. Essential hypertension        3. Paroxysmal SVT (supraventricular tachycardia) ([address]        4. Coronary artery disease, non-occlusive              HPI  Patient was initially seen in our office with complaints of palpitations and brief SVT on heart monitor in 2019, nonobstructive CAD    She presented to the hospital 5/6/2021 with dizziness found to be bradycardic and hypotensive. Antihypertensives and beta-blocker were discontinued. 2D echo shows normal LV size and function with EF 55 to 60%. Mild concentric LVH. Mild MR and mildly dilated LA. Heart catheterization 5/6/2021 showed nonobstructive coronary disease with ostial RCA approximately 55% stenosis. Mid circumflex 20% stenosis and LAD moderate to severe distal vessel tortuosity    Patient denies chest pain or dyspnea. No issues lightheadedness, syncope. She reports she did some therapy for her chronic dizziness which has now resolved. She notices heart rate no lower than 50s. She reports she does not use her blood pressure medication regularly, only if her blood pressure elevates. She has had some spikes in her blood pressure recently up as high as [address], APRN is PCP.   Derick Holden has the following history as [facility] in [facility]:    Patient Active Problem List    Diagnosis Date Noted    Vertigo 06/01/2021    Symptomatic bradycardia 05/06/2021    Essential hypertension 02/27/2019    Hyperlipidemia 08/17/2018    Abnormal urination 08/17/2018    Female stress incontinence 11/09/2017    Prolapse of vaginal vault after hysterectomy 2017    Rectocele 2017    Enterocele 2017    Cystocele, midline 2017     Past Medical History:   Diagnosis Date    Arthritis     Atrial fibrillation (HCC)     Carpal tunnel syndrome     x2 right hand    Depression     History of blood clots     behind left knee    History of vein stripping     left leg    Hyperlipidemia     Hypertension     Neurofibromatosis ([address]     Polio     as a child    Tachycardia      Past Surgical History:   Procedure Laterality Date    ANKLE SURGERY Left     3 surgeries    APPENDECTOMY      BLADDER SURGERY      x 2    CARDIAC CATHETERIZATION  2021    Cx 20%.  RCA 55%    CARPAL TUNNEL RELEASE Right     COLONOSCOPY  2019    Dr Caitlyn [facility]kins [facility]-Retained stool, TVA, (-) dysplasia x 1, HP x 1, 3 yr recall    HERNIA REPAIR      HYSTERECTOMY (CERVIX STATUS UNKNOWN)      MASTECTOMY, BILATERAL Bilateral     2010 had implants redone    SINUS SURGERY      UPPER GASTROINTESTINAL ENDOSCOPY  2019    Dr Caitlyn Elkins [facility]-Duodenitis, gastric antral ulcer, gastritis, repeat in 3 months    UPPER GASTROINTESTINAL ENDOSCOPY N/A 2020    Dr Eneida Alcantar-Esophagitis, gastritis, well healed gastric ulceration      Family History   Problem Relation Age of Onset    Hypertension Father     Stroke Father          age 80    Brain Cancer Mother     Colon Cancer Neg Hx     Colon Polyps Neg Hx      Social History     Tobacco Use    Smoking status: Former     Packs/day: 1.00     Years: 24.00     Pack years: 24.00     Types: Cigarettes     Quit date:      Years since quittin.7    Smokeless tobacco: Never   Substance Use Topics    Alcohol use: Yes     Comment: Occasional.      Current Outpatient Medications   Medication Sig Dispense Refill    valsartan (DIOVAN) 40 MG tablet TAKE 1 TABLET BY MOUTH TWICE DAILY 60 tablet 0    escitalopram (LEXAPRO) 20 MG tablet TAKE 1 TABLET BY MOUTH DAILY 90 tablet 0    tiZANidine (ZANAFLEX) 4 MG tablet TAKE 1 TABLET BY MOUTH EVERY 6 HOURS AS NEEDED FOR BACK PAIN 90 tablet 1    atorvastatin (LIPITOR) 40 MG tablet Take 1 tablet by mouth daily 90 tablet 1    HYDROcodone-acetaminophen (NORCO) 7.5-325 MG per tablet Take 1 tablet by mouth every 6 hours as needed for Pain. omeprazole 20 MG EC tablet Take 20 mg by mouth daily      Cholecalciferol (VITAMIN D3) 2000 units CAPS Take 2,000 Units by mouth       No current facility-administered medications for this visit. Allergies: Patient has no known allergies. Review of Systems  Review of Systems   Constitutional:  Negative for activity change, diaphoresis, fatigue, fever and unexpected weight change. HENT:  Negative for facial swelling and nosebleeds. Eyes:  Negative for redness and visual disturbance. Respiratory:  Negative for cough, chest tightness, shortness of breath and wheezing. Cardiovascular:  Negative for chest pain, palpitations and leg swelling. Gastrointestinal:  Negative for abdominal pain, nausea and vomiting. Endocrine: Negative for cold intolerance and heat intolerance. Genitourinary:  Negative for dysuria and hematuria. Musculoskeletal:  Negative for arthralgias and myalgias. Skin:  Negative for pallor and rash. Neurological:  Negative for dizziness, seizures, syncope, weakness and light-headedness. Hematological:  Does not bruise/bleed easily. Psychiatric/Behavioral:  Negative for agitation. The patient is not nervous/anxious. Objective  Vital Signs - /80   Pulse 66   Ht 5' 1\" (1.549 m)   Wt 159 lb (72.1 kg)   SpO2 98%   BMI 30.04 kg/m²   Physical Exam  Vitals and nursing note reviewed. Constitutional:       General: She is not in acute distress. Appearance: She is well-developed. She is not diaphoretic. HENT:      Head: Normocephalic and atraumatic. Right Ear: Hearing and external ear normal.      Left Ear: Hearing and external ear normal.      Nose: Nose normal.   Eyes:      General:         Right eye: No discharge. TSH 0.884 03/08/2022 11:49 AM     Heart Cath 5/11/21  Conclusions      Nonobstructive CAD with ostial RCA intermediate grade stenosis. Normal LV systolic function. Recommendations      Medical management. Signatures      ----------------------------------------------------------------   Electronically signed by Tre Yan MD(Performing Physician) on   05/11/2021 15:10    Echo 5/7//21  Conclusions      Summary   Mitral valve leaflets are mildly thickened with preserved leaflet   mobility. Mild mitral regurgitation is present. Mildly thickened aortic valve leaflets with preserved leaflet mobility. Tricuspid valve is structurally normal.   Mild tricuspid regurgitation with estimated RVSP of 44 mm Hg. Mildly dilated left atrium. Normal left ventricular size with preserved LV function and an estimated   ejection fraction of approximately 55-60%. Impaired relaxation compatible with diastolic dysfunction. ( reversed E/A   ratio)   Mild concentric left ventricular hypertrophy. No regional wall motion abnormalities. Signature      ----------------------------------------------------------------   Electronically signed by Julisa Yost MD(Interpreting   physician) on 05/08/2021 10:02 AM    Zio patch heart monitor 6/15/2021  Sinus   9 brief SVT, longest 13 beats  No significant bradycardia, pauses or heart block  No atrial fibrillation    Zio patch heart monitor 3/11/2019  Worn 4 days and 12 hours  Predominantly sinus rhythm  No significant bradycardia, pauses or heart block  6 SVT, longest 9 beats    Reviewed EKG that shows sinus bradycardia rate 59    Assessment:     Diagnosis Orders   1. Symptomatic bradycardia        2. Essential hypertension        3. Paroxysmal SVT (supraventricular tachycardia) ([address]        4. Coronary artery disease, non-occlusive             Hypertension-unstable, some spikes recently per patient report as high as 180. She is only taking low-dose valsartan. Recommend she use it regularly at bedtime    Symptomatic bradycardia-with hospitalization in May 2021. Beta-blocker was discontinued. Zio patch heart monitor showed no significant pauses, heart block or arrhythmias. Monitor for signs of unusual dizziness, lightheadedness or syncope. Patient reports heart rate running no lower than 50s at home    Paroxysmal SVT-stable, brief, rare  palpitations, not symptomatic. Continue to monitor    CAD-nonocclusive per heart cath in May 2021. Continue atorvastatin      Stable cardiovascular status. No evidence of overt heart failure,angina or dysrhythmia. Plan    Return in about 1 year (around 9/26/2023) for NEO. Call for unusual dizziness, weakness, passing out spells    Call with any questionsor concerns  Follow up with NEO Kirkpatrick for non cardiac problems  Report any new problems  Cardiovascular Fitness-Exercise as tolerated. Strive for 15 minutes of exercise most days of the week. Cardiac / HealthyDiet  Continue current medications as directed  Continue plan of treatment  It is always recommended that you bring your medicationsbottles with you to each visit - this is for your safety!        NEO Bryant

## 2022-09-27 ENCOUNTER — TELEPHONE (OUTPATIENT)
Dept: FAMILY MEDICINE CLINIC | Age: 77
End: 2022-09-27

## 2022-09-27 DIAGNOSIS — Z87.11 HISTORY OF STOMACH ULCERS: ICD-10-CM

## 2022-09-27 RX ORDER — TOLTERODINE 2 MG/1
2 CAPSULE, EXTENDED RELEASE ORAL DAILY
Qty: 90 CAPSULE | Refills: 0 | Status: SHIPPED | OUTPATIENT
Start: 2022-09-27

## 2022-09-27 RX ORDER — SUCRALFATE 1 G/1
1 TABLET ORAL 4 TIMES DAILY
Qty: 120 TABLET | Refills: 0 | Status: SHIPPED | OUTPATIENT
Start: 2022-09-27 | End: 2022-11-03

## 2022-09-27 NOTE — TELEPHONE ENCOUNTER
Caesarchristiano Cross called to request a refill on her medication.       Last office visit : 9/26/2022   Next office visit : Visit date not found     Requested Prescriptions     Pending Prescriptions Disp Refills    tolterodine (DETROL LA) 2 MG extended release capsule [Pharmacy Med Name: TOLTERODINE TART 2MG ER CAPSULES] 90 capsule      Sig: TAKE 1 CAPSULE BY MOUTH DAILY       This was already ordered yesterday      Stephanie Giles

## 2022-09-27 NOTE — TELEPHONE ENCOUNTER
Anthony Colunga found out what medication it was she took for her stomach . It was Carafate . Can we call some in for her .

## 2022-09-28 RX ORDER — SUCRALFATE 1 G/1
TABLET ORAL
Qty: 360 TABLET | OUTPATIENT
Start: 2022-09-28

## 2022-10-01 DIAGNOSIS — N32.81 OVERACTIVE BLADDER: ICD-10-CM

## 2022-10-01 LAB — H PYLORI ANTIGEN STOOL: NEGATIVE

## 2022-10-03 RX ORDER — TOLTERODINE 2 MG/1
2 CAPSULE, EXTENDED RELEASE ORAL DAILY
Qty: 90 CAPSULE | Refills: 0 | OUTPATIENT
Start: 2022-10-03

## 2022-10-03 NOTE — TELEPHONE ENCOUNTER
Esme Ferreira called to request a refill on her medication.       Last office visit : 9/26/2022   Next office visit : Visit date not found     Requested Prescriptions     Pending Prescriptions Disp Refills    tolterodine (DETROL LA) 2 MG extended release capsule [Pharmacy Med Name: TOLTERODINE TART 2MG ER CAPSULES] 90 capsule 0     Sig: TAKE 1 CAPSULE BY MOUTH DAILY            Ismael Junior

## 2022-10-11 DIAGNOSIS — I10 ESSENTIAL HYPERTENSION: ICD-10-CM

## 2022-10-11 DIAGNOSIS — Z87.11 HISTORY OF STOMACH ULCERS: ICD-10-CM

## 2022-10-11 LAB
BASOPHILS ABSOLUTE: 0.1 K/UL (ref 0–0.2)
BASOPHILS RELATIVE PERCENT: 1.3 % (ref 0–1)
EOSINOPHILS ABSOLUTE: 0.2 K/UL (ref 0–0.6)
EOSINOPHILS RELATIVE PERCENT: 2.1 % (ref 0–5)
HCT VFR BLD CALC: 47.9 % (ref 37–47)
HEMOGLOBIN: 15.3 G/DL (ref 12–16)
IMMATURE GRANULOCYTES #: 0 K/UL
LYMPHOCYTES ABSOLUTE: 1.7 K/UL (ref 1.1–4.5)
LYMPHOCYTES RELATIVE PERCENT: 19.9 % (ref 20–40)
MCH RBC QN AUTO: 30.2 PG (ref 27–31)
MCHC RBC AUTO-ENTMCNC: 31.9 G/DL (ref 33–37)
MCV RBC AUTO: 94.5 FL (ref 81–99)
MONOCYTES ABSOLUTE: 0.6 K/UL (ref 0–0.9)
MONOCYTES RELATIVE PERCENT: 6.5 % (ref 0–10)
NEUTROPHILS ABSOLUTE: 6 K/UL (ref 1.5–7.5)
NEUTROPHILS RELATIVE PERCENT: 70 % (ref 50–65)
PDW BLD-RTO: 13.2 % (ref 11.5–14.5)
PLATELET # BLD: 292 K/UL (ref 130–400)
PMV BLD AUTO: 11.1 FL (ref 9.4–12.3)
RBC # BLD: 5.07 M/UL (ref 4.2–5.4)
WBC # BLD: 8.5 K/UL (ref 4.8–10.8)

## 2022-11-01 NOTE — TELEPHONE ENCOUNTER
Kelsea Chowdhury called to request a refill on her medication.       Last office visit : 9/26/2022   Next office visit : Visit date not found     Requested Prescriptions     Pending Prescriptions Disp Refills    sucralfate (CARAFATE) 1 GM tablet [Pharmacy Med Name: SUCRALFATE 1GM TABLETS] 120 tablet 0     Sig: TAKE 1 TABLET BY MOUTH FOUR TIMES DAILY            Janusz Rosales

## 2022-11-03 DIAGNOSIS — F33.1 MODERATE EPISODE OF RECURRENT MAJOR DEPRESSIVE DISORDER (HCC): ICD-10-CM

## 2022-11-03 RX ORDER — SUCRALFATE 1 G/1
TABLET ORAL
Qty: 120 TABLET | Refills: 0 | Status: SHIPPED | OUTPATIENT
Start: 2022-11-03

## 2022-11-07 RX ORDER — ESCITALOPRAM OXALATE 20 MG/1
20 TABLET ORAL DAILY
Qty: 90 TABLET | Refills: 1 | Status: SHIPPED | OUTPATIENT
Start: 2022-11-07

## 2022-11-07 NOTE — TELEPHONE ENCOUNTER
Herve Hawkins called to request a refill on her medication.       Last office visit : 9/26/2022   Next office visit : Visit date not found     Requested Prescriptions     Pending Prescriptions Disp Refills    escitalopram (LEXAPRO) 20 MG tablet [Pharmacy Med Name: ESCITALOPRAM 20MG TABLETS] 90 tablet 0     Sig: TAKE 1 TABLET BY MOUTH DAILY            Denny Reed

## 2022-11-09 ENCOUNTER — OFFICE VISIT (OUTPATIENT)
Dept: CARDIOLOGY CLINIC | Age: 77
End: 2022-11-09
Payer: MEDICARE

## 2022-11-09 VITALS
BODY MASS INDEX: 30.21 KG/M2 | SYSTOLIC BLOOD PRESSURE: 144 MMHG | OXYGEN SATURATION: 97 % | HEART RATE: 76 BPM | HEIGHT: 61 IN | WEIGHT: 160 LBS | DIASTOLIC BLOOD PRESSURE: 78 MMHG

## 2022-11-09 DIAGNOSIS — I10 ESSENTIAL HYPERTENSION: Primary | ICD-10-CM

## 2022-11-09 DIAGNOSIS — R00.2 PALPITATIONS: ICD-10-CM

## 2022-11-09 DIAGNOSIS — R00.1 SYMPTOMATIC BRADYCARDIA: ICD-10-CM

## 2022-11-09 DIAGNOSIS — I25.10 CORONARY ARTERY DISEASE, NON-OCCLUSIVE: ICD-10-CM

## 2022-11-09 PROCEDURE — 99214 OFFICE O/P EST MOD 30 MIN: CPT | Performed by: CLINICAL NURSE SPECIALIST

## 2022-11-09 PROCEDURE — 3078F DIAST BP <80 MM HG: CPT | Performed by: CLINICAL NURSE SPECIALIST

## 2022-11-09 PROCEDURE — G8484 FLU IMMUNIZE NO ADMIN: HCPCS | Performed by: CLINICAL NURSE SPECIALIST

## 2022-11-09 PROCEDURE — 1036F TOBACCO NON-USER: CPT | Performed by: CLINICAL NURSE SPECIALIST

## 2022-11-09 PROCEDURE — G8399 PT W/DXA RESULTS DOCUMENT: HCPCS | Performed by: CLINICAL NURSE SPECIALIST

## 2022-11-09 PROCEDURE — G8427 DOCREV CUR MEDS BY ELIG CLIN: HCPCS | Performed by: CLINICAL NURSE SPECIALIST

## 2022-11-09 PROCEDURE — G8417 CALC BMI ABV UP PARAM F/U: HCPCS | Performed by: CLINICAL NURSE SPECIALIST

## 2022-11-09 PROCEDURE — 3074F SYST BP LT 130 MM HG: CPT | Performed by: CLINICAL NURSE SPECIALIST

## 2022-11-09 PROCEDURE — 1123F ACP DISCUSS/DSCN MKR DOCD: CPT | Performed by: CLINICAL NURSE SPECIALIST

## 2022-11-09 PROCEDURE — 1090F PRES/ABSN URINE INCON ASSESS: CPT | Performed by: CLINICAL NURSE SPECIALIST

## 2022-11-09 RX ORDER — VALSARTAN 80 MG/1
80 TABLET ORAL 2 TIMES DAILY
Qty: 60 TABLET | Refills: 5 | Status: SHIPPED | OUTPATIENT
Start: 2022-11-09 | End: 2022-11-21

## 2022-11-09 NOTE — PROGRESS NOTES
[address] [place], [address] [id number]  Phone: [phone]  Fax: [phone]    OFFICE VISIT:  2022    Hangrafal Mann - : 1945    Reason For Visit:  Karrie Merida is a 68 y.o. female who is here for Follow-up (Pt has issues with htn), Atrial Fibrillation, Hypertension, and Bradycardia  Originally establish care in 2019. Nonobstructive coronary disease, palpitations and brief SVT noted on heart monitor. May 2021 presented in the hospital dizzy and found to be bradycardic and hypotensive. Antihypertensives and beta-blockers were discontinued. 2D echo showed normal LV size and function with EF 55 to 60%. Mild concentric LVH. Mild MR mildly dilated LA. Heart catheterization 2021 showed nonobstructive coronary disease with ostial RCA approximately 55% stenosis. Mid circumflex 20% stenosis and LAD moderate to severe distal vessel tortuosity. Patient was seen in September with complaints of elevated blood pressure intermittently. Not been taking ARB consistently. Recommended taking low-dose valsartan nightly    Returns today in follow-up. Reports that her blood pressure has continued to be elevated. She is taking her Valsartan BID  Woke this AM with /104  Took her meds and was still elevated at 10 AM  Has head ache/burning sensation on the top of her head. Prior has been running 140-170s   Staying up for about 3 weeks    She did report that she had weaned off Lexapro earlier this year. She states that her depression seems to be getting worse and she started back on it yesterday    Subjective  Karrie Merida denies exertional chest pain, shortness of breath, orthopnea, paroxysmal nocturnal dyspnea, syncope, presyncope, arrhythmia, edema and fatigue. The patient denies numbness or weakness to suggest cerebrovascular accident or transient ischemic attack. NEO Johnson is PCP and follows labs .   Hangrafal Mann has the following history as recorded in [facility]:    Patient Active Problem List    Diagnosis Date Noted    Vertigo 2021    Symptomatic bradycardia 2021    Essential hypertension 2019    Hyperlipidemia 2018    Abnormal urination 2018    Female stress incontinence 2017    Prolapse of vaginal vault after hysterectomy 2017    Rectocele 2017    Enterocele 2017    Cystocele, midline 2017     Past Medical History:   Diagnosis Date    Arthritis     Atrial fibrillation ([address]     Carpal tunnel syndrome     x2 right hand    Depression     History of blood clots     behind left knee    History of vein stripping     left leg    Hyperlipidemia     Hypertension     Neurofibromatosis ([address]     Polio     as a child    Tachycardia      Past Surgical History:   Procedure Laterality Date    ANKLE SURGERY Left     3 surgeries    APPENDECTOMY      BLADDER SURGERY      x 2    CARDIAC CATHETERIZATION  2021    Cx 20%. RCA 55%    CARPAL TUNNEL RELEASE Right     COLONOSCOPY  2019    Dr An Cook [facility]-Retained stool, TVA, (-) dysplasia x 1, HP x 1, 3 yr recall    HERNIA REPAIR      HYSTERECTOMY (CERVIX STATUS UNKNOWN)      MASTECTOMY, BILATERAL Bilateral     2010 had implants redone    SINUS SURGERY      UPPER GASTROINTESTINAL ENDOSCOPY  2019    Dr An Cook [facility]-Duodenitis, gastric antral ulcer, gastritis, repeat in 3 months    UPPER GASTROINTESTINAL ENDOSCOPY N/A 2020    Dr Ciara Alcantar-Esophagitis, gastritis, well healed gastric ulceration      Family History   Problem Relation Age of Onset    Hypertension Father     Stroke Father          age 80    Brain Cancer Mother     Colon Cancer Neg Hx     Colon Polyps Neg Hx      Social History     Tobacco Use    Smoking status: Former     Packs/day: 1.00     Years: 24.00     Pack years: 24.00     Types: Cigarettes     Quit date:      Years since quittin.8    Smokeless tobacco: Never   Substance Use Topics    Alcohol use:  Yes Comment: Occasional.      Current Outpatient Medications   Medication Sig Dispense Refill    valsartan (DIOVAN) 80 MG tablet Take 1 tablet by mouth 2 times daily 60 tablet 5    escitalopram (LEXAPRO) 20 MG tablet TAKE 1 TABLET BY MOUTH DAILY 90 tablet 1    sucralfate (CARAFATE) 1 GM tablet TAKE 1 TABLET BY MOUTH FOUR TIMES DAILY 120 tablet 0    tolterodine (DETROL LA) 2 MG extended release capsule TAKE 1 CAPSULE BY MOUTH DAILY 90 capsule 0    esomeprazole (NEXIUM) 40 MG delayed release capsule Take 1 capsule by mouth every morning (before breakfast) 30 capsule 5    tiZANidine (ZANAFLEX) 4 MG tablet TAKE 1 TABLET BY MOUTH EVERY 6 HOURS AS NEEDED FOR BACK PAIN 90 tablet 1    HYDROcodone-acetaminophen (NORCO) 7.5-325 MG per tablet Take 1 tablet by mouth every 6 hours as needed for Pain. omeprazole 20 MG EC tablet Take 20 mg by mouth daily      atorvastatin (LIPITOR) 40 MG tablet Take 1 tablet by mouth daily (Patient not taking: Reported on 11/9/2022) 90 tablet 1     No current facility-administered medications for this visit. Allergies: Patient has no known allergies. Review of Systems  Constitutional - no significant activity change, appetite change, or unexpected weight change. No fever, chills or diaphoresis. No fatigue. HEENT - no significant rhinorrhea or epistaxis. No tinnitus or significant hearing loss. Eyes - no sudden vision change or amaurosis. Respiratory - no significant wheezing, stridor, apnea or cough. No dyspnea on exertion or shortness of breath. Cardiovascular - no exertional chest pain, orthopnea or PND. No sensation of arrhythmia or slow heart rate. No claudication or leg edema. Gastrointestinal - no abdominal swelling or pain. No blood in stool. No severe constipation, diarrhea, nausea, or vomiting. Genitourinary - no difficulty urinating, dysuria, frequency, or urgency. No flank pain or hematuria. Musculoskeletal - no back pain, gait disturbance, or myalgia. Skin - no color change or rash. No pallor. No new surgical incision. Neurologic - no speech difficulty, facial asymmetry or lateralizing weakness. No seizures, presyncope, syncope, or significant dizziness. Hematologic - no easy bruising or excessive bleeding. Psychiatric - no severe anxiety or insomnia. No confusion. All other review of systems are negative. Objective  Vital Signs - BP (!) 144/78   Pulse 76   Ht 5' 1\" (1.549 m)   Wt 160 lb (72.6 kg)   SpO2 97%   BMI 30.23 kg/m²   General - Kimberlee Rose is alert, cooperative, and pleasant. Well groomed. No acute distress. Body habitus is overweight. HEENT - The head is normocephalic. No circumoral cyanosis. Dentition is normal.   EYES -  No Xanthelasma, no arcus senilis, no conjunctival hemorrhages or discharge. Neck - Supple, without increased jugular venous pressures. No carotid bruits. No mass. Respiratory - Lungs are clear bilaterally. No wheezes or rales. Normal effort without use of accessory muscles. Cardiovascular - Heart has regular rhythm and rate. No murmurs, rubs or gallops. + pedal pulses and no varicosities. Abdominal -  Soft, nontender, nondistended. Bowel sounds are intact. Extremities - No clubbing, cyanosis, or  edema. Musculoskeletal -  No clubbing . No Osler's nodes. Gait normal .  No kyphosis or scoliosis. Skin -  no statis ulcers or dermatitis. Neurological - No focal signs are identified. Oriented to person, place and time. Psychiatric -  Appropriate affect and mood. Assessment:     Diagnosis Orders   1. Essential hypertension  valsartan (DIOVAN) 80 MG tablet      2. Symptomatic bradycardia        3. Coronary artery disease, non-occlusive        4.  Palpitations          Data:  BP Readings from Last 3 Encounters:   11/09/22 (!) 144/78   09/26/22 132/80   09/26/22 132/80    Pulse Readings from Last 3 Encounters:   11/09/22 76   09/26/22 66   09/26/22 66        Wt Readings from Last 3 Encounters:   11/09/22 160 lb (72.6 kg)   09/26/22 159 lb (72.1 kg)   09/26/22 159 lb (72.1 kg)       Elevated blood pressure on low-dose valsartan. Previous history of symptomatic bradycardia. Not on beta-blocker    Will further increase her valsartan to 80 mg twice a day. We will have her monitor blood pressure at home. Discussed that it may take a week or 2 to level out to see where her blood pressure will be running. Further adjustments possible. Reviewed PCP recent notes   Reviewed recent labs    Heart catheterization 5/11/2021  Diagnostic procedure:DCA, Coronary Angiogram, Left Heart Cath, Left   Ventricular Pressure Measurement    Conclusions    Nonobstructive CAD with ostial RCA intermediate grade stenosis. Normal LV systolic function. Recommendations    Medical management.   -----------------------------------------------   Electronically signed by Monnie Councilman Mani,MD(Performing Physician) on   05/11/2021 15:10      Zio patch heart monitor 6/15/2021  Sinus   9 brief SVT, longest 13 beats  No significant bradycardia, pauses or heart block  No atrial fibrillation     Zio patch heart monitor 3/11/2019  Worn 4 days and 12 hours  Predominantly sinus rhythm  No significant bradycardia, pauses or heart block  6 SVT, longest 9 beats    Ulcers in the past- on carafate and nexium-recommending seeing GI if indigestion symptoms continue       States taking medications as prescribed  Stable cardiovascular status. No evidence of overt heart failure, angina or dysrhythmia. 30 minutes were spent preparing, reviewing and seeing patient.   All questions answered    Plan    Increase your valsartan to 80mg twice a day  Blood pressure control-goal<130/80 at rest    Maintain good cholesterol control LDL goal<70 with arterial disease  Stay on the lipitor for cholesterol     Follow up in 4 weeks   Call with any questions or concerns  Follow up with NEO Moise for non cardiac problems  Report any new problems  Cardiovascular Fitness-Exercise as tolerated. Strive for 30 minutes of exercise most days of the week. Cardiac / Healthy Diet- Avoid processed high fat foods, maintain low sodium/salt   Continue current medications as directed  Continue plan of treatment  It is always recommended that you bring your medications bottles with you to each visit - this is for your safety! NEO Mcdaniel dragon/transcription disclaimer: Much of this encounter note is electronic transcription/translation of spoken language to printed tach. Electronic translation of spoken language may be erroneous, or at times, nonsensical words or phrases may be inadvertently transcribed.  Although, I have reviewed the note for such errors, some may still exist.

## 2022-11-09 NOTE — PATIENT INSTRUCTIONS
Increase your valsartan to 80mg twice a day  Blood pressure control-goal<130/80 at rest    Maintain good cholesterol control LDL goal<70 with arterial disease  Stay on the lipitor for cholesterol     Follow up in 4 weeks   Call with any questions or concerns  Follow up with NEO Perla for non cardiac problems  Report any new problems  Cardiovascular Fitness-Exercise as tolerated. Strive for 30 minutes of exercise most days of the week. Cardiac / Healthy Diet- Avoid processed high fat foods, maintain low sodium/salt   Continue current medications as directed  Continue plan of treatment  It is always recommended that you bring your medications bottles with you to each visit - this is for your safety!

## 2022-11-13 ENCOUNTER — APPOINTMENT (OUTPATIENT)
Dept: GENERAL RADIOLOGY | Age: 77
End: 2022-11-13
Payer: MEDICARE

## 2022-11-13 ENCOUNTER — HOSPITAL ENCOUNTER (EMERGENCY)
Age: 77
Discharge: HOME | End: 2022-11-13
Attending: EMERGENCY MEDICINE
Payer: MEDICARE

## 2022-11-13 ENCOUNTER — APPOINTMENT (OUTPATIENT)
Dept: CT IMAGING | Age: 77
End: 2022-11-13
Payer: MEDICARE

## 2022-11-13 VITALS
WEIGHT: 155 LBS | OXYGEN SATURATION: 95 % | SYSTOLIC BLOOD PRESSURE: 171 MMHG | BODY MASS INDEX: 29.27 KG/M2 | HEART RATE: 62 BPM | RESPIRATION RATE: 18 BRPM | HEIGHT: 61 IN | TEMPERATURE: 97.6 F | DIASTOLIC BLOOD PRESSURE: 71 MMHG

## 2022-11-13 DIAGNOSIS — R42 DIZZINESS: ICD-10-CM

## 2022-11-13 DIAGNOSIS — R79.89 ELEVATED SERUM CREATININE: Primary | ICD-10-CM

## 2022-11-13 DIAGNOSIS — R03.0 ELEVATED BLOOD PRESSURE READING: ICD-10-CM

## 2022-11-13 LAB
ALBUMIN SERPL-MCNC: 4 G/DL (ref 3.5–5.2)
ALP BLD-CCNC: 145 U/L (ref 35–104)
ALT SERPL-CCNC: 25 U/L (ref 5–33)
ANION GAP SERPL CALCULATED.3IONS-SCNC: 7 MMOL/L (ref 7–19)
AST SERPL-CCNC: 17 U/L (ref 5–32)
BACTERIA: NEGATIVE /HPF
BASOPHILS ABSOLUTE: 0.1 K/UL (ref 0–0.2)
BASOPHILS RELATIVE PERCENT: 1.3 % (ref 0–1)
BILIRUB SERPL-MCNC: 0.5 MG/DL (ref 0.2–1.2)
BILIRUBIN URINE: NEGATIVE
BLOOD, URINE: NEGATIVE
BUN BLDV-MCNC: 16 MG/DL (ref 8–23)
CALCIUM SERPL-MCNC: 8.8 MG/DL (ref 8.8–10.2)
CHLORIDE BLD-SCNC: 103 MMOL/L (ref 98–111)
CLARITY: CLEAR
CO2: 27 MMOL/L (ref 22–29)
COLOR: YELLOW
CREAT SERPL-MCNC: 1.2 MG/DL (ref 0.5–0.9)
CRYSTALS, UA: ABNORMAL /HPF
EOSINOPHILS ABSOLUTE: 0.1 K/UL (ref 0–0.6)
EOSINOPHILS RELATIVE PERCENT: 1.6 % (ref 0–5)
EPITHELIAL CELLS, UA: 2 /HPF (ref 0–5)
GFR SERPL CREATININE-BSD FRML MDRD: 47 ML/MIN/{1.73_M2}
GLUCOSE BLD-MCNC: 98 MG/DL (ref 74–109)
GLUCOSE URINE: NEGATIVE MG/DL
HCT VFR BLD CALC: 45.5 % (ref 37–47)
HEMOGLOBIN: 14.7 G/DL (ref 12–16)
HYALINE CASTS: 2 /HPF (ref 0–8)
IMMATURE GRANULOCYTES #: 0.1 K/UL
INR BLD: 1.01 (ref 0.88–1.18)
KETONES, URINE: NEGATIVE MG/DL
LEUKOCYTE ESTERASE, URINE: ABNORMAL
LYMPHOCYTES ABSOLUTE: 1.4 K/UL (ref 1.1–4.5)
LYMPHOCYTES RELATIVE PERCENT: 18.8 % (ref 20–40)
MCH RBC QN AUTO: 29.8 PG (ref 27–31)
MCHC RBC AUTO-ENTMCNC: 32.3 G/DL (ref 33–37)
MCV RBC AUTO: 92.3 FL (ref 81–99)
MONOCYTES ABSOLUTE: 0.5 K/UL (ref 0–0.9)
MONOCYTES RELATIVE PERCENT: 6.7 % (ref 0–10)
NEUTROPHILS ABSOLUTE: 5.4 K/UL (ref 1.5–7.5)
NEUTROPHILS RELATIVE PERCENT: 70.8 % (ref 50–65)
NITRITE, URINE: NEGATIVE
PDW BLD-RTO: 13 % (ref 11.5–14.5)
PH UA: 6.5 (ref 5–8)
PLATELET # BLD: 251 K/UL (ref 130–400)
PMV BLD AUTO: 11.1 FL (ref 9.4–12.3)
POTASSIUM REFLEX MAGNESIUM: 4.5 MMOL/L (ref 3.5–5)
PROTEIN UA: NEGATIVE MG/DL
PROTHROMBIN TIME: 13.2 SEC (ref 12–14.6)
RBC # BLD: 4.93 M/UL (ref 4.2–5.4)
RBC UA: 1 /HPF (ref 0–4)
SODIUM BLD-SCNC: 137 MMOL/L (ref 136–145)
SPECIFIC GRAVITY UA: 1.01 (ref 1–1.03)
TOTAL PROTEIN: 6.9 G/DL (ref 6.6–8.7)
TROPONIN: <0.01 NG/ML (ref 0–0.03)
UROBILINOGEN, URINE: 1 E.U./DL
WBC # BLD: 7.6 K/UL (ref 4.8–10.8)
WBC UA: 3 /HPF (ref 0–5)

## 2022-11-13 PROCEDURE — 81001 URINALYSIS AUTO W/SCOPE: CPT

## 2022-11-13 PROCEDURE — 85610 PROTHROMBIN TIME: CPT

## 2022-11-13 PROCEDURE — 93005 ELECTROCARDIOGRAM TRACING: CPT | Performed by: EMERGENCY MEDICINE

## 2022-11-13 PROCEDURE — 71045 X-RAY EXAM CHEST 1 VIEW: CPT

## 2022-11-13 PROCEDURE — 2580000003 HC RX 258: Performed by: EMERGENCY MEDICINE

## 2022-11-13 PROCEDURE — 36415 COLL VENOUS BLD VENIPUNCTURE: CPT

## 2022-11-13 PROCEDURE — 96360 HYDRATION IV INFUSION INIT: CPT

## 2022-11-13 PROCEDURE — 71045 X-RAY EXAM CHEST 1 VIEW: CPT | Performed by: RADIOLOGY

## 2022-11-13 PROCEDURE — 85025 COMPLETE CBC W/AUTO DIFF WBC: CPT

## 2022-11-13 PROCEDURE — 99285 EMERGENCY DEPT VISIT HI MDM: CPT | Performed by: EMERGENCY MEDICINE

## 2022-11-13 PROCEDURE — 96361 HYDRATE IV INFUSION ADD-ON: CPT

## 2022-11-13 PROCEDURE — 70450 CT HEAD/BRAIN W/O DYE: CPT

## 2022-11-13 PROCEDURE — 84484 ASSAY OF TROPONIN QUANT: CPT

## 2022-11-13 PROCEDURE — 70450 CT HEAD/BRAIN W/O DYE: CPT | Performed by: RADIOLOGY

## 2022-11-13 PROCEDURE — 80053 COMPREHEN METABOLIC PANEL: CPT

## 2022-11-13 RX ORDER — 0.9 % SODIUM CHLORIDE 0.9 %
500 INTRAVENOUS SOLUTION INTRAVENOUS ONCE
Status: COMPLETED | OUTPATIENT
Start: 2022-11-13 | End: 2022-11-13

## 2022-11-13 RX ADMIN — SODIUM CHLORIDE 500 ML: 9 INJECTION, SOLUTION INTRAVENOUS at 16:53

## 2022-11-13 ASSESSMENT — PAIN - FUNCTIONAL ASSESSMENT: PAIN_FUNCTIONAL_ASSESSMENT: NONE - DENIES PAIN

## 2022-11-13 ASSESSMENT — ENCOUNTER SYMPTOMS
ABDOMINAL PAIN: 0
DIARRHEA: 0
VOMITING: 0
SHORTNESS OF BREATH: 0
EYE PAIN: 0

## 2022-11-13 NOTE — ED PROVIDER NOTES
[facility] - [place] - [facility] EMERGENCY DEPT  eMERGENCY dEPARTMENT eNCOUnter      Pt Name: Ector Pickering  MRN: [id number]  [place] 1945  Date of evaluation: 11/13/2022  Provider: Yosef Hurtado MD    CHIEF COMPLAINT       Chief Complaint   Patient presents with    Hypertension     States that her blood pressure has not been below 893 systolic for a month, today sbp running in the 200's, has taken medicine for it and it has not come down ( took 120 mg of Valsartan this morning)      Dizziness     Started today when she got up to walk and let the ambulance in felt very dizzy, upon walking from EMS stretcher to ER stretcher patient felt very dizzy         HISTORY OF PRESENT ILLNESS   (Location/Symptom, Timing/Onset,Context/Setting, Quality, Duration, Modifying Factors, Severity)  Note limiting factors. Ector Pickering is a 68 y.o. female who presents to the emergency department elevated blood pressure. Patient has history of hypertension and said her blood pressure has been running high for the last month. Has been seen by her PCP for this and valsartan was increased. Tells me that despite this her blood pressure has continued to run very high. She has also felt dizzy off and on for the last month. No vision problems numbness or weakness. No headache. No chest pain or shortness of breath. No abdominal pain nausea vomiting diarrhea. No urinary complaints. Took an extra dose of her valsartan today when her blood pressure was running high. Also has felt a little more dizzy today. HPI    NursingNotes were reviewed. REVIEW OF SYSTEMS    (2-9 systems for level 4, 10 or more for level 5)     Review of Systems   Constitutional:  Negative for fever. Eyes:  Negative for pain and visual disturbance. Respiratory:  Negative for shortness of breath. Cardiovascular:  Negative for chest pain and palpitations. Gastrointestinal:  Negative for abdominal pain, diarrhea and vomiting.    Genitourinary:  Negative for dysuria. Skin:  Negative for rash. Neurological:  Positive for dizziness. Negative for syncope, facial asymmetry, speech difficulty, weakness, numbness and headaches. All other systems reviewed and are negative. A complete review of systems was performed and is negative except as noted above in the HPI. PAST MEDICAL HISTORY     Past Medical History:   Diagnosis Date    Arthritis     Atrial fibrillation ([facility] [address]     Carpal tunnel syndrome     x2 right hand    Depression     History of blood clots     behind left knee    History of vein stripping     left leg    Hyperlipidemia     Hypertension     Neurofibromatosis ([facility] [address]     Polio     as a child    Tachycardia          SURGICAL HISTORY       Past Surgical History:   Procedure Laterality Date    ANKLE SURGERY Left     3 surgeries    APPENDECTOMY      BLADDER SURGERY      x 2    CARDIAC CATHETERIZATION  05/11/2021    Cx 20%. RCA 55%    CARPAL TUNNEL RELEASE Right     COLONOSCOPY  11/06/2019    Dr Eugene James [facility]-Retained stool, TVA, (-) dysplasia x 1, HP x 1, 3 yr recall    HERNIA REPAIR      HYSTERECTOMY (CERVIX STATUS UNKNOWN)      MASTECTOMY, BILATERAL Bilateral     2010 had implants redone    SINUS SURGERY      UPPER GASTROINTESTINAL ENDOSCOPY  11/06/2019    Dr Eugene James [facility]-Duodenitis, gastric antral ulcer, gastritis, repeat in 3 months    UPPER GASTROINTESTINAL ENDOSCOPY N/A 02/18/2020    Dr Lyla Mike Alcantar-Esophagitis, gastritis, well healed gastric ulceration          CURRENT MEDICATIONS       Previous Medications    ATORVASTATIN (LIPITOR) 40 MG TABLET    Take 1 tablet by mouth daily    ESCITALOPRAM (LEXAPRO) 20 MG TABLET    TAKE 1 TABLET BY MOUTH DAILY    ESOMEPRAZOLE (NEXIUM) 40 MG DELAYED RELEASE CAPSULE    Take 1 capsule by mouth every morning (before breakfast)    HYDROCODONE-ACETAMINOPHEN (NORCO) 7.5-325 MG PER TABLET    Take 1 tablet by mouth every 6 hours as needed for Pain.     OMEPRAZOLE 20 MG EC TABLET    Take 20 mg by mouth daily SUCRALFATE (CARAFATE) 1 GM TABLET    TAKE 1 TABLET BY MOUTH FOUR TIMES DAILY    TIZANIDINE (ZANAFLEX) 4 MG TABLET    TAKE 1 TABLET BY MOUTH EVERY 6 HOURS AS NEEDED FOR BACK PAIN    TOLTERODINE (DETROL LA) 2 MG EXTENDED RELEASE CAPSULE    TAKE 1 CAPSULE BY MOUTH DAILY    VALSARTAN (DIOVAN) 80 MG TABLET    Take 1 tablet by mouth 2 times daily       ALLERGIES     Patient has no known allergies. FAMILY HISTORY       Family History   Problem Relation Age of Onset    Hypertension Father     Stroke Father          age 80    Brain Cancer Mother     Colon Cancer Neg Hx     Colon Polyps Neg Hx           SOCIAL HISTORY       Social History     Socioeconomic History    Marital status:       Spouse name: None    Number of children: None    Years of education: None    Highest education level: None   Tobacco Use    Smoking status: Former     Packs/day: 1.00     Years: 24.00     Pack years: 24.00     Types: Cigarettes     Quit date: 80     Years since quittin.8    Smokeless tobacco: Never   Vaping Use    Vaping Use: Never used   Substance and Sexual Activity    Alcohol use: Yes     Comment: Occasional.    Drug use: No     Social Determinants of Health     Financial Resource Strain: Low Risk     Difficulty of Paying Living Expenses: Not hard at all   Food Insecurity: No Food Insecurity    Worried About [employer] in the Last Year: Never true    [employer] in the Last Year: Never true       SCREENINGS    [place] Coma Scale  Eye Opening: Spontaneous  Best Verbal Response: Oriented  Best Motor Response: Obeys commands  [place] Coma Scale Score: 15        PHYSICAL EXAM    (up to 7 for level 4, 8 or more for level 5)     ED Triage Vitals   BP Temp Temp Source Heart Rate Resp SpO2 Height Weight   22 1512 22 1512 22 1512 22 1512 22 1512 22 1512 22 1509 22 1509   (!) 201/81 97.6 °F (36.4 °C) Oral 63 18 94 % 5' 1\" (1.549 m) 155 lb (70.3 kg)       Physical Exam  Vitals reviewed. Constitutional:       General: She is not in acute distress. Appearance: She is well-developed. HENT:      Head: Normocephalic and atraumatic. Right Ear: Tympanic membrane, ear canal and external ear normal.      Left Ear: Tympanic membrane, ear canal and external ear normal.      Mouth/Throat:      Mouth: Mucous membranes are moist.      Pharynx: Oropharynx is clear. Eyes:      General: No scleral icterus. Pupils: Pupils are equal, round, and reactive to light. Neck:      Vascular: No JVD. Cardiovascular:      Rate and Rhythm: Normal rate and regular rhythm. Pulses: Normal pulses. Heart sounds: Normal heart sounds. Pulmonary:      Effort: Pulmonary effort is normal. No respiratory distress. Breath sounds: Normal breath sounds. Abdominal:      General: There is no distension. Palpations: Abdomen is soft. Tenderness: There is no abdominal tenderness. There is no guarding or rebound. Musculoskeletal:         General: No tenderness. Cervical back: Normal range of motion and neck supple. Right lower leg: No edema. Left lower leg: No edema. Skin:     General: Skin is warm and dry. Capillary Refill: Capillary refill takes less than 2 seconds. Neurological:      General: No focal deficit present. Mental Status: She is alert and oriented to person, place, and time. GCS: GCS eye subscore is 4. GCS verbal subscore is 5. GCS motor subscore is 6. Cranial Nerves: Cranial nerves 2-12 are intact. Sensory: Sensation is intact. Motor: Motor function is intact. Coordination: Coordination is intact. Gait: Gait is intact. Psychiatric:         Mood and Affect: Mood normal.         Behavior: Behavior normal.       DIAGNOSTIC RESULTS     EKG: All EKG's are interpreted by the Emergency Department Physician who either signs or Co-signs this chart in the absence of a cardiologist.    Normal sinus rhythm. T wave inversions laterally that looks similar to EKG from 2/14/2022. Normal QT. RADIOLOGY:   Non-plain film images such as CT, Ultrasound and MRI are read by the radiologist. Zhane Logan images are visualized and preliminarily interpreted by the emergency physician with the below findings:        Interpretation per the Radiologist below, if available at the time of this note:    CT HEAD WO CONTRAST   Final Result   No acute intracranial abnormality is present. No evidence of acute cortical infarction, hemorrhage, mass or mass effect   Recommendation: Follow up as clinically indicated. All CT scans at this facility utilize dose modulation, iterative reconstruction, and/or weight based dosing when appropriate to reduce radiation dose to as low as reasonably achievable. Amended by Sophie Goldberg MD at 13-Nov-2022 05:50:03 PM EST   Electronically Signed by Sophie Goldberg MD at 13-Nov-2022 05:40:29 PM EST               XR CHEST PORTABLE   Final Result   No gross infiltrate   Recommendation: Follow up as clinically indicated.    Electronically Signed by Sophie Goldberg MD at 13-Nov-2022 05:34:17 PM EST                     ED BEDSIDE ULTRASOUND:   Performed by ED Physician - none    LABS:  Labs Reviewed   CBC WITH AUTO DIFFERENTIAL - Abnormal; Notable for the following components:       Result Value    MCHC 32.3 (*)     Neutrophils % 70.8 (*)     Lymphocytes % 18.8 (*)     Basophils % 1.3 (*)     All other components within normal limits   COMPREHENSIVE METABOLIC PANEL W/ REFLEX TO MG FOR LOW K - Abnormal; Notable for the following components:    Creatinine 1.2 (*)     Est, Glom Filt Rate 47 (*)     Alkaline Phosphatase 145 (*)     All other components within normal limits   URINALYSIS WITH REFLEX TO CULTURE - Abnormal; Notable for the following components:    Leukocyte Esterase, Urine SMALL (*)     All other components within normal limits   MICROSCOPIC URINALYSIS - Abnormal; Notable for the following components:    Bacteria, UA NEGATIVE (*)     Crystals, UA NEG (*)     All other components within normal limits   TROPONIN   PROTIME-INR       All other labs were within normal range or not returned as of this dictation. EMERGENCY DEPARTMENT COURSE and DIFFERENTIALDIAGNOSIS/MDM:   Vitals:    Vitals:    11/13/22 1509 11/13/22 1512 11/13/22 1518 11/13/22 1531   BP:  (!) 201/81  (!) 164/83   Pulse:  63 62 59   Resp:  18  18   Temp:  97.6 °F (36.4 °C)     TempSrc:  Oral     SpO2:  94%  92%   Weight: 155 lb (70.3 kg)      Height: 5' 1\" (1.549 m)          MDM    Resting comfortably no distress. Nontoxic on exam at this time. Blood pressure improved. Told patient cannot be certain as to the etiology of her elevated blood pressure for the last several weeks. Creatinine is borderline elevated compared to prior labs. Small fluid bolus ordered. Given her elevated blood pressure, dizziness and slightly elevated creatinine talk to patient and possible admission for further monitoring evaluation. Told her it is possible that her dizziness is due to a stroke. I think this is unlikely but told her cannot rule this out in the ER. After thorough discussion patient declined admission and instead prefers to go home and follow-up with her doctor as an outpatient. Told patient to call her doctor in the morning for follow-up and to notify PCP about visit here today so the PCP can review all of patient's results from today's visit. Told to return to the ER immediately for change worsening symptoms or new concerns. Patient agreeable plan. I have discussed my findings, my impression, and my differential diagnosis to the patient. The patient understands the risks, benefits, and alternatives of an inpatient versus outpatient continued evaluation and treatment. The patient has capacity to make medical decisions. The patient declines hospital admission or further observation in the emergency department.  The patient understands the importance of follow-up and agrees to return if the condition changes, worsens, or if the patient changes his/her mind about inpatient evaluation and care. CONSULTS:  None    PROCEDURES:  Unless otherwise notedbelow, none     Procedures    FINAL IMPRESSION     1. Elevated serum creatinine    2. Dizziness    3.  Elevated blood pressure reading          DISPOSITION/PLAN   DISPOSITION Discharge - Pending Orders Complete 11/13/2022 04:58:40 PM      PATIENT REFERRED TO:  @FUP@    DISCHARGE MEDICATIONS:  New Prescriptions    No medications on file          (Please note that portions of this note were completed with a voice recognition program.  Efforts were made to edit the dictations butoccasionally words are mis-transcribed.)    Yosef Hurtado MD (electronically signed)  AttendingEmergency Physician          Yosef Hurtado MD  11/13/22 [id number]

## 2022-11-13 NOTE — ED NOTES
Patient placed in gown, on cardiac monitor, call light within reach        [address] Jose Nelson RN  11/13/22 [id number]

## 2022-11-14 ENCOUNTER — OFFICE VISIT (OUTPATIENT)
Dept: CARDIOLOGY CLINIC | Age: 77
End: 2022-11-14
Payer: MEDICARE

## 2022-11-14 VITALS
OXYGEN SATURATION: 100 % | WEIGHT: 159 LBS | SYSTOLIC BLOOD PRESSURE: 162 MMHG | BODY MASS INDEX: 30.02 KG/M2 | HEART RATE: 64 BPM | DIASTOLIC BLOOD PRESSURE: 82 MMHG | HEIGHT: 61 IN

## 2022-11-14 DIAGNOSIS — R51.9 NONINTRACTABLE HEADACHE, UNSPECIFIED CHRONICITY PATTERN, UNSPECIFIED HEADACHE TYPE: ICD-10-CM

## 2022-11-14 DIAGNOSIS — I10 ESSENTIAL HYPERTENSION: Primary | ICD-10-CM

## 2022-11-14 DIAGNOSIS — R00.1 SYMPTOMATIC BRADYCARDIA: ICD-10-CM

## 2022-11-14 DIAGNOSIS — I25.10 CORONARY ARTERY DISEASE, NON-OCCLUSIVE: ICD-10-CM

## 2022-11-14 LAB
EKG P AXIS: 61 DEGREES
EKG P-R INTERVAL: 156 MS
EKG Q-T INTERVAL: 424 MS
EKG QRS DURATION: 78 MS
EKG QTC CALCULATION (BAZETT): 425 MS
EKG T AXIS: 114 DEGREES

## 2022-11-14 PROCEDURE — 99213 OFFICE O/P EST LOW 20 MIN: CPT | Performed by: CLINICAL NURSE SPECIALIST

## 2022-11-14 PROCEDURE — 1123F ACP DISCUSS/DSCN MKR DOCD: CPT | Performed by: CLINICAL NURSE SPECIALIST

## 2022-11-14 PROCEDURE — G8417 CALC BMI ABV UP PARAM F/U: HCPCS | Performed by: CLINICAL NURSE SPECIALIST

## 2022-11-14 PROCEDURE — 1036F TOBACCO NON-USER: CPT | Performed by: CLINICAL NURSE SPECIALIST

## 2022-11-14 PROCEDURE — 1090F PRES/ABSN URINE INCON ASSESS: CPT | Performed by: CLINICAL NURSE SPECIALIST

## 2022-11-14 PROCEDURE — 3078F DIAST BP <80 MM HG: CPT | Performed by: CLINICAL NURSE SPECIALIST

## 2022-11-14 PROCEDURE — G8427 DOCREV CUR MEDS BY ELIG CLIN: HCPCS | Performed by: CLINICAL NURSE SPECIALIST

## 2022-11-14 PROCEDURE — G8399 PT W/DXA RESULTS DOCUMENT: HCPCS | Performed by: CLINICAL NURSE SPECIALIST

## 2022-11-14 PROCEDURE — G8484 FLU IMMUNIZE NO ADMIN: HCPCS | Performed by: CLINICAL NURSE SPECIALIST

## 2022-11-14 PROCEDURE — 93010 ELECTROCARDIOGRAM REPORT: CPT | Performed by: INTERNAL MEDICINE

## 2022-11-14 PROCEDURE — 3074F SYST BP LT 130 MM HG: CPT | Performed by: CLINICAL NURSE SPECIALIST

## 2022-11-14 RX ORDER — AMLODIPINE BESYLATE 5 MG/1
5 TABLET ORAL DAILY
Qty: 30 TABLET | Refills: 5 | Status: SHIPPED | OUTPATIENT
Start: 2022-11-14

## 2022-11-14 ASSESSMENT — ENCOUNTER SYMPTOMS
WHEEZING: 0
VOMITING: 0
EYE REDNESS: 0
NAUSEA: 0
ABDOMINAL PAIN: 0
CHEST TIGHTNESS: 0
COUGH: 0
FACIAL SWELLING: 0
SHORTNESS OF BREATH: 0

## 2022-11-14 NOTE — PATIENT INSTRUCTIONS
Return in about 1 week (around 11/21/2022) for APRN.    Start Amlodipine 5mg at bedtime  Continue Valsartan

## 2022-11-14 NOTE — PROGRESS NOTES
[facility], [address] [place] [address], [id number]  Phone: [phone]  Fax: [phone]    OFFICE VISIT:  11/14/2022    [address]: 1945    Reason For Visit:  Miladis Blanchard is a 68 y.o. female who is here for Follow-up (Patient was in ER yesterday for HTN. Staremilia Khanna had just increased her valsartan. ) and Hypertension       Diagnosis Orders   1. Essential hypertension        2. Symptomatic bradycardia        3. Coronary artery disease, non-occlusive        4. Nonintractable headache, unspecified chronicity pattern, unspecified headache type                HPI  Patient was initially seen in our office with complaints of palpitations and brief SVT on heart monitor in 2019, nonobstructive CAD    She presented to the hospital 5/6/2021 with dizziness found to be bradycardic and hypotensive. Antihypertensives and beta-blocker were discontinued. 2D echo shows normal LV size and function with EF 55 to 60%. Mild concentric LVH. Mild MR and mildly dilated LA. Heart catheterization 5/6/2021 showed nonobstructive coronary disease with ostial RCA approximately 55% stenosis. Mid circumflex 20% stenosis and LAD moderate to severe distal vessel tortuosity    Patient reports she is noticing elevated blood pressure with associated headache. Patient can be as high as 117 systolically. He saw NEO Richard in the office on 11/9/2022 and her valsartan was increased. She presented to the emergency room yesterday with continued elevated blood pressure. He was ruled out for MI and received a small fluid bolus for elevated creatinine. She was told to follow-up with our office     NEO Estrada is PCP.   Love Mario has the following history as recorded in [employer]:    Patient Active Problem List    Diagnosis Date Noted    Vertigo 06/01/2021    Symptomatic bradycardia 05/06/2021    Essential hypertension 02/27/2019    Hyperlipidemia 08/17/2018    Abnormal urination 2018    Female stress incontinence 2017    Prolapse of vaginal vault after hysterectomy 2017    Rectocele 2017    Enterocele 2017    Cystocele, midline 2017     Past Medical History:   Diagnosis Date    Arthritis     Atrial fibrillation (HCC)     Carpal tunnel syndrome     x2 right hand    Depression     History of blood clots     behind left knee    History of vein stripping     left leg    Hyperlipidemia     Hypertension     Neurofibromatosis ([address]     Polio     as a child    Tachycardia      Past Surgical History:   Procedure Laterality Date    ANKLE SURGERY Left     3 surgeries    APPENDECTOMY      BLADDER SURGERY      x 2    CARDIAC CATHETERIZATION  2021    Cx 20%.  RCA 55%    CARPAL TUNNEL RELEASE Right     COLONOSCOPY  2019    Dr Boni Dubin [facility]-Retained stool, TVA, (-) dysplasia x 1, HP x 1, 3 yr recall    HERNIA REPAIR      HYSTERECTOMY (CERVIX STATUS UNKNOWN)      MASTECTOMY, BILATERAL Bilateral     2010 had implants redone    SINUS SURGERY      UPPER GASTROINTESTINAL ENDOSCOPY  2019    Dr Boni Dubin Co-Duodenitis, gastric antral ulcer, gastritis, repeat in 3 months    UPPER GASTROINTESTINAL ENDOSCOPY N/A 2020    Dr Kirby Shelby Alcantar-Esophagitis, gastritis, well healed gastric ulceration      Family History   Problem Relation Age of Onset    Hypertension Father     Stroke Father          age 80    Brain Cancer Mother     Colon Cancer Neg Hx     Colon Polyps Neg Hx      Social History     Tobacco Use    Smoking status: Former     Packs/day: 1.00     Years: 24.00     Pack years: 24.00     Types: Cigarettes     Quit date:      Years since quittin.8    Smokeless tobacco: Never   Substance Use Topics    Alcohol use: Yes     Comment: Occasional.      Current Outpatient Medications   Medication Sig Dispense Refill    amLODIPine (NORVASC) 5 MG tablet Take 1 tablet by mouth daily 30 tablet 5    valsartan (DIOVAN) 80 MG tablet Take 1 tablet by mouth 2 times daily 60 tablet 5    escitalopram (LEXAPRO) 20 MG tablet TAKE 1 TABLET BY MOUTH DAILY 90 tablet 1    sucralfate (CARAFATE) 1 GM tablet TAKE 1 TABLET BY MOUTH FOUR TIMES DAILY 120 tablet 0    tolterodine (DETROL LA) 2 MG extended release capsule TAKE 1 CAPSULE BY MOUTH DAILY 90 capsule 0    esomeprazole (NEXIUM) 40 MG delayed release capsule Take 1 capsule by mouth every morning (before breakfast) 30 capsule 5    tiZANidine (ZANAFLEX) 4 MG tablet TAKE 1 TABLET BY MOUTH EVERY 6 HOURS AS NEEDED FOR BACK PAIN 90 tablet 1    atorvastatin (LIPITOR) 40 MG tablet Take 1 tablet by mouth daily 90 tablet 1    HYDROcodone-acetaminophen (NORCO) 7.5-325 MG per tablet Take 1 tablet by mouth every 6 hours as needed for Pain. omeprazole 20 MG EC tablet Take 20 mg by mouth daily       No current facility-administered medications for this visit. Allergies: Patient has no known allergies. Review of Systems  Review of Systems   Constitutional:  Negative for activity change, diaphoresis, fatigue, fever and unexpected weight change. HENT:  Negative for facial swelling and nosebleeds. Eyes:  Negative for redness and visual disturbance. Respiratory:  Negative for cough, chest tightness, shortness of breath and wheezing. Cardiovascular:  Negative for chest pain, palpitations and leg swelling. Complains of elevated blood pressure   Gastrointestinal:  Negative for abdominal pain, nausea and vomiting. Endocrine: Negative for cold intolerance and heat intolerance. Genitourinary:  Negative for dysuria and hematuria. Musculoskeletal:  Negative for arthralgias and myalgias. Skin:  Negative for pallor and rash. Neurological:  Positive for headaches. Negative for dizziness, seizures, syncope, weakness and light-headedness. Hematological:  Does not bruise/bleed easily. Psychiatric/Behavioral:  Negative for agitation. The patient is not nervous/anxious.       Objective  Vital Signs - BP (!) 162/82   Pulse 64   Ht 5' 1\" (1.549 m)   Wt 159 lb (72.1 kg)   SpO2 100%   BMI 30.04 kg/m²   Physical Exam  Vitals and nursing note reviewed. Constitutional:       General: She is not in acute distress. Appearance: She is well-developed. She is not diaphoretic. HENT:      Head: Normocephalic and atraumatic. Right Ear: Hearing and external ear normal.      Left Ear: Hearing and external ear normal.      Nose: Nose normal.   Eyes:      General:         Right eye: No discharge. Left eye: No discharge. Pupils: Pupils are equal, round, and reactive to light. Neck:      Thyroid: No thyromegaly. Vascular: No carotid bruit or JVD. Trachea: No tracheal deviation. Cardiovascular:      Rate and Rhythm: Normal rate and regular rhythm. Heart sounds: Normal heart sounds. No murmur heard. No friction rub. No gallop. Pulmonary:      Effort: Pulmonary effort is normal. No respiratory distress. Breath sounds: Normal breath sounds. No wheezing or rales. Abdominal:      Palpations: Abdomen is soft. Tenderness: There is no abdominal tenderness. Musculoskeletal:         General: No swelling or deformity. Cervical back: Neck supple. No muscular tenderness. Right lower leg: No edema. Skin:     General: Skin is warm and dry. Findings: No rash. Neurological:      General: No focal deficit present. Mental Status: She is alert and oriented to person, place, and time. Cranial Nerves: No cranial nerve deficit.    Psychiatric:         Mood and Affect: Mood normal.         Behavior: Behavior normal.         Judgment: Judgment normal.       Data:  Lab Results   Component Value Date/Time    WBC 7.6 11/13/2022 03:10 PM    RBC 4.93 11/13/2022 03:10 PM    HGB 14.7 11/13/2022 03:10 PM    HCT 45.5 11/13/2022 03:10 PM     11/13/2022 03:10 PM      Lab Results   Component Value Date/Time    CHOL 292 08/09/2021 11:28 AM    TRIG 203 08/09/2021 11:28 AM HDL 49 09/26/2022 04:13 PM    LDLCALC 129 09/26/2022 04:13 PM     Lab Results   Component Value Date/Time     11/13/2022 03:10 PM    K 4.5 11/13/2022 03:10 PM     11/13/2022 03:10 PM    CO2 27 11/13/2022 03:10 PM    GLUCOSE 98 11/13/2022 03:10 PM    BUN 16 11/13/2022 03:10 PM    CREATININE 1.2 11/13/2022 03:10 PM    CALCIUM 8.8 11/13/2022 03:10 PM    ALT 25 11/13/2022 03:10 PM    AST 17 11/13/2022 03:10 PM     Lab Results   Component Value Date/Time    TSH 0.884 03/08/2022 11:49 AM     Heart Cath 5/11/21  Conclusions      Nonobstructive CAD with ostial RCA intermediate grade stenosis. Normal LV systolic function. Recommendations      Medical management. Signatures      ----------------------------------------------------------------   Electronically signed by Rahul Yan MD(Performing Physician) on   05/11/2021 15:10    Echo 5/7//21  Conclusions      Summary   Mitral valve leaflets are mildly thickened with preserved leaflet   mobility. Mild mitral regurgitation is present. Mildly thickened aortic valve leaflets with preserved leaflet mobility. Tricuspid valve is structurally normal.   Mild tricuspid regurgitation with estimated RVSP of 44 mm Hg. Mildly dilated left atrium. Normal left ventricular size with preserved LV function and an estimated   ejection fraction of approximately 55-60%. Impaired relaxation compatible with diastolic dysfunction. ( reversed E/A   ratio)   Mild concentric left ventricular hypertrophy. No regional wall motion abnormalities.       Signature      ----------------------------------------------------------------   Electronically signed by Palmer Benson MD(Interpreting   physician) on 05/08/2021 10:02 AM    Zio patch heart monitor 6/15/2021  Sinus   9 brief SVT, longest 13 beats  No significant bradycardia, pauses or heart block  No atrial fibrillation    Zio patch heart monitor 3/11/2019  Worn 4 days and 12 hours  Predominantly sinus rhythm  No significant bradycardia, pauses or heart block  6 SVT, longest 9 beats        Assessment:     Diagnosis Orders   1. Essential hypertension        2. Symptomatic bradycardia        3. Coronary artery disease, non-occlusive        4. Nonintractable headache, unspecified chronicity pattern, unspecified headache type               Hypertension-unstable, with  Visit and ER visit. Valsartan was doubled last week with continued elevation. History of bradycardia. We will add amlodipine 5 mg at bedtime with close follow-up in 1 week to see how she is doing. Her home blood pressure cuff read about 30 points higher than office reading    Symptomatic bradycardia-with hospitalization in May 2021. Beta-blocker was discontinued. Zio patch heart monitor showed no significant pauses, heart block or arrhythmias. CAD-nonocclusive per heart cath in May 2021. Stable without angina       Plan    Return in about 1 week (around 11/21/2022) for NEO. Start Amlodipine 5mg at bedtime  Continue Valsartan    Call with any questionsor concerns  Follow up with NEO Gibbs for non cardiac problems  Report any new problems  Cardiovascular Fitness-Exercise as tolerated. Strive for 15 minutes of exercise most days of the week. Cardiac / HealthyDiet  Continue current medications as directed  Continue plan of treatment  It is always recommended that you bring your medicationsbottles with you to each visit - this is for your safety!        NEO Pereira

## 2022-11-21 ENCOUNTER — OFFICE VISIT (OUTPATIENT)
Dept: CARDIOLOGY CLINIC | Age: 77
End: 2022-11-21
Payer: MEDICARE

## 2022-11-21 VITALS
BODY MASS INDEX: 29.27 KG/M2 | SYSTOLIC BLOOD PRESSURE: 124 MMHG | OXYGEN SATURATION: 98 % | DIASTOLIC BLOOD PRESSURE: 72 MMHG | WEIGHT: 155 LBS | HEIGHT: 61 IN | HEART RATE: 70 BPM

## 2022-11-21 DIAGNOSIS — I10 ESSENTIAL HYPERTENSION: ICD-10-CM

## 2022-11-21 PROCEDURE — G8417 CALC BMI ABV UP PARAM F/U: HCPCS | Performed by: CLINICAL NURSE SPECIALIST

## 2022-11-21 PROCEDURE — 3074F SYST BP LT 130 MM HG: CPT | Performed by: CLINICAL NURSE SPECIALIST

## 2022-11-21 PROCEDURE — 99213 OFFICE O/P EST LOW 20 MIN: CPT | Performed by: CLINICAL NURSE SPECIALIST

## 2022-11-21 PROCEDURE — G8484 FLU IMMUNIZE NO ADMIN: HCPCS | Performed by: CLINICAL NURSE SPECIALIST

## 2022-11-21 PROCEDURE — 1036F TOBACCO NON-USER: CPT | Performed by: CLINICAL NURSE SPECIALIST

## 2022-11-21 PROCEDURE — 3078F DIAST BP <80 MM HG: CPT | Performed by: CLINICAL NURSE SPECIALIST

## 2022-11-21 PROCEDURE — G8427 DOCREV CUR MEDS BY ELIG CLIN: HCPCS | Performed by: CLINICAL NURSE SPECIALIST

## 2022-11-21 PROCEDURE — 1123F ACP DISCUSS/DSCN MKR DOCD: CPT | Performed by: CLINICAL NURSE SPECIALIST

## 2022-11-21 PROCEDURE — G8399 PT W/DXA RESULTS DOCUMENT: HCPCS | Performed by: CLINICAL NURSE SPECIALIST

## 2022-11-21 PROCEDURE — 1090F PRES/ABSN URINE INCON ASSESS: CPT | Performed by: CLINICAL NURSE SPECIALIST

## 2022-11-21 RX ORDER — VALSARTAN 80 MG/1
80 TABLET ORAL DAILY
Qty: 30 TABLET | Refills: 5
Start: 2022-11-21

## 2022-11-21 ASSESSMENT — ENCOUNTER SYMPTOMS
CHEST TIGHTNESS: 0
WHEEZING: 0
ABDOMINAL PAIN: 0
SHORTNESS OF BREATH: 0
VOMITING: 0
FACIAL SWELLING: 0
NAUSEA: 0
EYE REDNESS: 0
COUGH: 0

## 2022-11-21 NOTE — PATIENT INSTRUCTIONS
Return in about 6 months (around 5/21/2023) for APRN.    Continue Amlodipine 5mg at bedtime  Decrease Valsartan 80mg once daily

## 2022-11-21 NOTE — PROGRESS NOTES
[facility], [address], [address] [address]  Phone: [phone]  Fax: [phone]    OFFICE VISIT:  11/21/2022    [address]: 1945    Reason For Visit:  Laquita Barros is a 68 y.o. female who is here for Follow-up (Patient is here for follow up since starting the amlodipine 5 mg daily. ) and Hypertension       Diagnosis Orders   1. Essential hypertension  valsartan (DIOVAN) 80 MG tablet              HPI  Patient was initially seen in our office with complaints of palpitations and brief SVT on heart monitor in 2019, nonobstructive CAD    She presented to the hospital 5/6/2021 with dizziness found to be bradycardic and hypotensive. Antihypertensives and beta-blocker were discontinued. 2D echo shows normal LV size and function with EF 55 to 60%. Mild concentric LVH. Mild MR and mildly dilated LA. Heart catheterization 5/6/2021 showed nonobstructive coronary disease with ostial RCA approximately 55% stenosis. Mid circumflex 20% stenosis and LAD moderate to severe distal vessel tortuosity    Patient reports she is noticing elevated blood pressure with associated headache. Patient can be as high as 538 systolically. She saw NEO Gaviria in the office on 11/9/2022 and her valsartan was increased. She presented to the emergency room yesterday with continued elevated blood pressure. He was ruled out for MI and received a small fluid bolus for elevated creatinine. She was told to follow-up with our office    She was seen again by myself in the office on 11/14/2022. Amlodipine was added to her regimen. She reports her blood pressure has improved significantly. She had some dizziness this morning about 1 hour after taking her morning dose of Diovan. She is wondering if her blood pressure may be getting a bit too low. She is tolerating the new medicine, amlodipine, without any difficulty or side effects     NEO Slaughter is PCP.   Lauqita Barros Nancy Tripathi has the following history as recorded in [facility]:    Patient Active Problem List    Diagnosis Date Noted    Vertigo 2021    Symptomatic bradycardia 2021    Essential hypertension 2019    Hyperlipidemia 2018    Abnormal urination 2018    Female stress incontinence 2017    Prolapse of vaginal vault after hysterectomy 2017    Rectocele 2017    Enterocele 2017    Cystocele, midline 2017     Past Medical History:   Diagnosis Date    Arthritis     Atrial fibrillation ([address]     Carpal tunnel syndrome     x2 right hand    Depression     History of blood clots     behind left knee    History of vein stripping     left leg    Hyperlipidemia     Hypertension     Neurofibromatosis ([address]     Polio     as a child    Tachycardia      Past Surgical History:   Procedure Laterality Date    ANKLE SURGERY Left     3 surgeries    APPENDECTOMY      BLADDER SURGERY      x 2    CARDIAC CATHETERIZATION  2021    Cx 20%.  RCA 55%    CARPAL TUNNEL RELEASE Right     COLONOSCOPY  2019    Dr Thomas Peña [facility]-Retained stool, TVA, (-) dysplasia x 1, HP x 1, 3 yr recall    HERNIA REPAIR      HYSTERECTOMY (CERVIX STATUS UNKNOWN)      MASTECTOMY, BILATERAL Bilateral     2010 had implants redone    SINUS SURGERY      UPPER GASTROINTESTINAL ENDOSCOPY  2019    Dr Thomas Peña Co-Duodenitis, gastric antral ulcer, gastritis, repeat in 3 months    UPPER GASTROINTESTINAL ENDOSCOPY N/A 2020    Dr Elam Smoker Alcantar-Esophagitis, gastritis, well healed gastric ulceration      Family History   Problem Relation Age of Onset    Hypertension Father     Stroke Father          age 80    Brain Cancer Mother     Colon Cancer Neg Hx     Colon Polyps Neg Hx      Social History     Tobacco Use    Smoking status: Former     Packs/day: 1.00     Years: 24.00     Pack years: 24.00     Types: Cigarettes     Quit date:      Years since quittin.9    Smokeless tobacco: Never Substance Use Topics    Alcohol use: Yes     Comment: Occasional.      Current Outpatient Medications   Medication Sig Dispense Refill    valsartan (DIOVAN) 80 MG tablet Take 1 tablet by mouth daily 30 tablet 5    amLODIPine (NORVASC) 5 MG tablet Take 1 tablet by mouth daily 30 tablet 5    escitalopram (LEXAPRO) 20 MG tablet TAKE 1 TABLET BY MOUTH DAILY 90 tablet 1    sucralfate (CARAFATE) 1 GM tablet TAKE 1 TABLET BY MOUTH FOUR TIMES DAILY 120 tablet 0    tolterodine (DETROL LA) 2 MG extended release capsule TAKE 1 CAPSULE BY MOUTH DAILY 90 capsule 0    esomeprazole (NEXIUM) 40 MG delayed release capsule Take 1 capsule by mouth every morning (before breakfast) 30 capsule 5    tiZANidine (ZANAFLEX) 4 MG tablet TAKE 1 TABLET BY MOUTH EVERY 6 HOURS AS NEEDED FOR BACK PAIN 90 tablet 1    atorvastatin (LIPITOR) 40 MG tablet Take 1 tablet by mouth daily 90 tablet 1    HYDROcodone-acetaminophen (NORCO) 7.5-325 MG per tablet Take 1 tablet by mouth every 6 hours as needed for Pain. omeprazole 20 MG EC tablet Take 20 mg by mouth daily       No current facility-administered medications for this visit. Allergies: Patient has no known allergies. Review of Systems  Review of Systems   Constitutional:  Negative for activity change, diaphoresis, fatigue, fever and unexpected weight change. HENT:  Negative for facial swelling and nosebleeds. Eyes:  Negative for redness and visual disturbance. Respiratory:  Negative for cough, chest tightness, shortness of breath and wheezing. Cardiovascular:  Negative for chest pain, palpitations and leg swelling. Complains of elevated blood pressure   Gastrointestinal:  Negative for abdominal pain, nausea and vomiting. Endocrine: Negative for cold intolerance and heat intolerance. Genitourinary:  Negative for dysuria and hematuria. Musculoskeletal:  Negative for arthralgias and myalgias. Skin:  Negative for pallor and rash.    Neurological:  Positive for headaches. Negative for dizziness, seizures, syncope, weakness and light-headedness. Hematological:  Does not bruise/bleed easily. Psychiatric/Behavioral:  Negative for agitation. The patient is not nervous/anxious. Objective  Vital Signs - /72   Pulse 70   Ht 5' 1\" (1.549 m)   Wt 155 lb (70.3 kg)   SpO2 98%   BMI 29.29 kg/m²   Physical Exam  Vitals and nursing note reviewed. Constitutional:       General: She is not in acute distress. Appearance: She is well-developed. She is not diaphoretic. HENT:      Head: Normocephalic and atraumatic. Right Ear: Hearing and external ear normal.      Left Ear: Hearing and external ear normal.      Nose: Nose normal.   Eyes:      General:         Right eye: No discharge. Left eye: No discharge. Pupils: Pupils are equal, round, and reactive to light. Neck:      Thyroid: No thyromegaly. Vascular: No carotid bruit or JVD. Trachea: No tracheal deviation. Cardiovascular:      Rate and Rhythm: Normal rate and regular rhythm. Heart sounds: Normal heart sounds. No murmur heard. No friction rub. No gallop. Pulmonary:      Effort: Pulmonary effort is normal. No respiratory distress. Breath sounds: Normal breath sounds. No wheezing or rales. Abdominal:      Palpations: Abdomen is soft. Tenderness: There is no abdominal tenderness. Musculoskeletal:         General: No swelling or deformity. Cervical back: Neck supple. No muscular tenderness. Right lower leg: No edema. Skin:     General: Skin is warm and dry. Findings: No rash. Neurological:      General: No focal deficit present. Mental Status: She is alert and oriented to person, place, and time. Cranial Nerves: No cranial nerve deficit.    Psychiatric:         Mood and Affect: Mood normal.         Behavior: Behavior normal.         Judgment: Judgment normal.       Data:  Lab Results   Component Value Date/Time    WBC 7.6 11/13/2022 03:10 PM    RBC 4.93 11/13/2022 03:10 PM    HGB 14.7 11/13/2022 03:10 PM    HCT 45.5 11/13/2022 03:10 PM     11/13/2022 03:10 PM      Lab Results   Component Value Date/Time    CHOL 292 08/09/2021 11:28 AM    TRIG 203 08/09/2021 11:28 AM    HDL 49 09/26/2022 04:13 PM    LDLCALC 129 09/26/2022 04:13 PM     Lab Results   Component Value Date/Time     11/13/2022 03:10 PM    K 4.5 11/13/2022 03:10 PM     11/13/2022 03:10 PM    CO2 27 11/13/2022 03:10 PM    GLUCOSE 98 11/13/2022 03:10 PM    BUN 16 11/13/2022 03:10 PM    CREATININE 1.2 11/13/2022 03:10 PM    CALCIUM 8.8 11/13/2022 03:10 PM    ALT 25 11/13/2022 03:10 PM    AST 17 11/13/2022 03:10 PM     Lab Results   Component Value Date/Time    TSH 0.884 03/08/2022 11:49 AM     Heart Cath 5/11/21  Conclusions      Nonobstructive CAD with ostial RCA intermediate grade stenosis. Normal LV systolic function. Recommendations      Medical management. Signatures      ----------------------------------------------------------------   Electronically signed by Yolonda Homans Mani,MD(Performing Physician) on   05/11/2021 15:10    Echo 5/7//21  Conclusions      Summary   Mitral valve leaflets are mildly thickened with preserved leaflet   mobility. Mild mitral regurgitation is present. Mildly thickened aortic valve leaflets with preserved leaflet mobility. Tricuspid valve is structurally normal.   Mild tricuspid regurgitation with estimated RVSP of 44 mm Hg. Mildly dilated left atrium. Normal left ventricular size with preserved LV function and an estimated   ejection fraction of approximately 55-60%. Impaired relaxation compatible with diastolic dysfunction. ( reversed E/A   ratio)   Mild concentric left ventricular hypertrophy. No regional wall motion abnormalities.       Signature      ----------------------------------------------------------------   Electronically signed by Kiera Nava MD(Interpreting   physician) on 05/08/2021 10:02 AM    Zio patch heart monitor 6/15/2021  Sinus   9 brief SVT, longest 13 beats  No significant bradycardia, pauses or heart block  No atrial fibrillation    Zio patch heart monitor 3/11/2019  Worn 4 days and 12 hours  Predominantly sinus rhythm  No significant bradycardia, pauses or heart block  6 SVT, longest 9 beats        Assessment:     Diagnosis Orders   1. Essential hypertension  valsartan (DIOVAN) 80 MG tablet             Hypertension-improved control since starting amlodipine. She had some dizziness this morning about an hour after taking her valsartan. We will decrease the valsartan to once daily dosing only. She should take the valsartan in the morning and the amlodipine at night. She is to stay well-hydrated. She should call if she is having problems with systolic blood pressure in the 90 range or systolic blood pressure greater than 140      Plan    Return in about 6 months (around 5/21/2023) for APRN. Continue Amlodipine 5mg at bedtime  Decrease Valsartan 80mg once daily    Call with any questionsor concerns  Follow up with NEO Estrada for non cardiac problems  Report any new problems  Cardiovascular Fitness-Exercise as tolerated. Strive for 15 minutes of exercise most days of the week. Cardiac / HealthyDiet  Continue current medications as directed  Continue plan of treatment  It is always recommended that you bring your medicationsbottles with you to each visit - this is for your safety!        Cristine Denver, APRN

## 2022-12-08 NOTE — TELEPHONE ENCOUNTER
Esme Ferreira called to request a refill on her medication.       Last office visit : 9/26/2022   Next office visit : Visit date not found     Requested Prescriptions     Pending Prescriptions Disp Refills    sucralfate (CARAFATE) 1 GM tablet [Pharmacy Med Name: SUCRALFATE 1GM TABLETS] 120 tablet 0     Sig: TAKE 1 TABLET BY MOUTH FOUR TIMES DAILY            Ismael Junior

## 2022-12-12 RX ORDER — SUCRALFATE 1 G/1
TABLET ORAL
Qty: 120 TABLET | Refills: 0 | Status: SHIPPED | OUTPATIENT
Start: 2022-12-12

## 2022-12-12 RX ORDER — SUCRALFATE 1 G/1
TABLET ORAL
Qty: 360 TABLET | OUTPATIENT
Start: 2022-12-12

## 2022-12-12 NOTE — TELEPHONE ENCOUNTER
Mary Mario called to request a refill on her medication.       Last office visit : 9/26/2022   Next office visit : Visit date not found     Requested Prescriptions     Pending Prescriptions Disp Refills    sucralfate (CARAFATE) 1 GM tablet [Pharmacy Med Name: SUCRALFATE 1GM TABLETS] 360 tablet      Sig: TAKE 1 TABLET BY MOUTH FOUR TIMES DAILY            Teresa Fajardo

## 2023-01-28 DIAGNOSIS — E78.2 MODERATE MIXED HYPERLIPIDEMIA NOT REQUIRING STATIN THERAPY: ICD-10-CM

## 2023-01-30 RX ORDER — ATORVASTATIN CALCIUM 40 MG/1
40 TABLET, FILM COATED ORAL DAILY
Qty: 90 TABLET | Refills: 0 | Status: SHIPPED | OUTPATIENT
Start: 2023-01-30

## 2023-01-30 NOTE — TELEPHONE ENCOUNTER
Danielle López called to request a refill on her medication.       Last office visit : 9/26/2022   Next office visit : Visit date not found     Requested Prescriptions     Pending Prescriptions Disp Refills    atorvastatin (LIPITOR) 40 MG tablet [Pharmacy Med Name: ATORVASTATIN 40MG TABLETS] 90 tablet 1     Sig: TAKE 1 TABLET BY MOUTH DAILY            Shawn Carpenter MA

## 2023-02-22 DIAGNOSIS — M54.50 CHRONIC BILATERAL LOW BACK PAIN WITHOUT SCIATICA: ICD-10-CM

## 2023-02-22 DIAGNOSIS — G89.29 CHRONIC BILATERAL LOW BACK PAIN WITHOUT SCIATICA: ICD-10-CM

## 2023-02-23 RX ORDER — SUCRALFATE 1 G/1
TABLET ORAL
Qty: 120 TABLET | Refills: 0 | Status: SHIPPED | OUTPATIENT
Start: 2023-02-23

## 2023-02-23 RX ORDER — TIZANIDINE 4 MG/1
TABLET ORAL
Qty: 90 TABLET | Refills: 0 | Status: SHIPPED | OUTPATIENT
Start: 2023-02-23 | End: 2023-03-21 | Stop reason: SDUPTHER

## 2023-02-28 ENCOUNTER — OFFICE VISIT (OUTPATIENT)
Dept: CARDIOLOGY CLINIC | Age: 78
End: 2023-02-28
Payer: COMMERCIAL

## 2023-02-28 VITALS
BODY MASS INDEX: 30.78 KG/M2 | WEIGHT: 163 LBS | DIASTOLIC BLOOD PRESSURE: 80 MMHG | HEIGHT: 61 IN | HEART RATE: 62 BPM | SYSTOLIC BLOOD PRESSURE: 142 MMHG

## 2023-02-28 DIAGNOSIS — E78.2 MIXED HYPERLIPIDEMIA: ICD-10-CM

## 2023-02-28 DIAGNOSIS — R00.1 BRADYCARDIA: ICD-10-CM

## 2023-02-28 DIAGNOSIS — I10 ESSENTIAL HYPERTENSION: Primary | ICD-10-CM

## 2023-02-28 DIAGNOSIS — I25.10 CORONARY ARTERY DISEASE, NON-OCCLUSIVE: ICD-10-CM

## 2023-02-28 PROCEDURE — 3077F SYST BP >= 140 MM HG: CPT | Performed by: CLINICAL NURSE SPECIALIST

## 2023-02-28 PROCEDURE — 1123F ACP DISCUSS/DSCN MKR DOCD: CPT | Performed by: CLINICAL NURSE SPECIALIST

## 2023-02-28 PROCEDURE — 99214 OFFICE O/P EST MOD 30 MIN: CPT | Performed by: CLINICAL NURSE SPECIALIST

## 2023-02-28 PROCEDURE — 3079F DIAST BP 80-89 MM HG: CPT | Performed by: CLINICAL NURSE SPECIALIST

## 2023-02-28 RX ORDER — HYDROCHLOROTHIAZIDE 12.5 MG/1
12.5 CAPSULE, GELATIN COATED ORAL EVERY MORNING
Qty: 90 CAPSULE | Refills: 3 | Status: SHIPPED | OUTPATIENT
Start: 2023-02-28

## 2023-02-28 RX ORDER — VALSARTAN 80 MG/1
80 TABLET ORAL 2 TIMES DAILY
Qty: 180 TABLET | Refills: 3 | Status: SHIPPED | OUTPATIENT
Start: 2023-02-28 | End: 2023-02-28 | Stop reason: SDUPTHER

## 2023-02-28 RX ORDER — VALSARTAN 80 MG/1
80 TABLET ORAL 2 TIMES DAILY
Qty: 180 TABLET | Refills: 3 | Status: SHIPPED | OUTPATIENT
Start: 2023-02-28

## 2023-02-28 ASSESSMENT — ENCOUNTER SYMPTOMS
WHEEZING: 0
EYE REDNESS: 0
VOMITING: 0
COUGH: 0
ABDOMINAL PAIN: 0
SHORTNESS OF BREATH: 0
FACIAL SWELLING: 0
NAUSEA: 0
CHEST TIGHTNESS: 0

## 2023-02-28 NOTE — PROGRESS NOTES
[facility], [address], [place]  [address]  Phone: [phone]  Fax: [phone]    OFFICE VISIT:  2023    Annie Ramirez - : 1945    Reason For Visit:  Alicia Webb is a 68 y.o. female who is here for Hypertension       Diagnosis Orders   1. Essential hypertension  valsartan (DIOVAN) 80 MG tablet    DISCONTINUED: valsartan (DIOVAN) 80 MG tablet      2. Coronary artery disease, non-occlusive        3. Bradycardia        4. Mixed hyperlipidemia                HPI  Patient was initially seen in our office with complaints of palpitations and brief SVT on heart monitor in , nonobstructive CAD (per cath ), hypertension, bradycardia    In the fall, patient was seen in our office for elevated blood pressure. Amlodipine was started and readings improved. As time has gone on, she basically stopped taking the amlodipine because she was concerned that it might be making her blood pressure too low. If her blood pressure was in the 130-140 range or less, she was not taking the medication and eventually just stopped it    She returns today experiencing some higher blood pressure readings and lower extremity edema. She denies sudden weight gain, orthopnea, PND or unusual dyspnea      NEO Anderson is PCP.   Annie Ramirez has the following history as recorded in [facility]:    Patient Active Problem List    Diagnosis Date Noted    Vertigo 2021    Symptomatic bradycardia 2021    Essential hypertension 2019    Hyperlipidemia 2018    Abnormal urination 2018    Female stress incontinence 2017    Prolapse of vaginal vault after hysterectomy 2017    Rectocele 2017    Enterocele 2017    Cystocele, midline 2017     Past Medical History:   Diagnosis Date    Arthritis     Atrial fibrillation ([facility] [address]     Carpal tunnel syndrome     x2 right hand    Depression     History of blood clots     behind left knee History of vein stripping     left leg    Hyperlipidemia     Hypertension     Neurofibromatosis ([facility] [address]     Polio     as a child    Tachycardia      Past Surgical History:   Procedure Laterality Date    ANKLE SURGERY Left     3 surgeries    APPENDECTOMY      BLADDER SURGERY      x 2    CARDIAC CATHETERIZATION  2021    Cx 20%.  RCA 55%    CARPAL TUNNEL RELEASE Right     COLONOSCOPY  2019    Dr Ladonna Baltazar Co-Retained stool, TVA, (-) dysplasia x 1, HP x 1, 3 yr recall    HERNIA REPAIR      HYSTERECTOMY (CERVIX STATUS UNKNOWN)      MASTECTOMY, BILATERAL Bilateral     2010 had implants redone    SINUS SURGERY      UPPER GASTROINTESTINAL ENDOSCOPY  2019    Dr Ladonna Baltazar Co-Duodenitis, gastric antral ulcer, gastritis, repeat in 3 months    UPPER GASTROINTESTINAL ENDOSCOPY N/A 2020    Dr Cesilia Alcantar-Esophagitis, gastritis, well healed gastric ulceration      Family History   Problem Relation Age of Onset    Hypertension Father     Stroke Father          age 80    Brain Cancer Mother     Colon Cancer Neg Hx     Colon Polyps Neg Hx      Social History     Tobacco Use    Smoking status: Former     Packs/day: 1.00     Years: 24.00     Pack years: 24.00     Types: Cigarettes     Quit date:      Years since quittin.1    Smokeless tobacco: Never   Substance Use Topics    Alcohol use: Yes     Comment: Occasional.      Current Outpatient Medications   Medication Sig Dispense Refill    hydroCHLOROthiazide (MICROZIDE) 12.5 MG capsule Take 1 capsule by mouth every morning 90 capsule 3    valsartan (DIOVAN) 80 MG tablet Take 1 tablet by mouth 2 times daily 180 tablet 3    sucralfate (CARAFATE) 1 GM tablet TAKE 1 TABLET BY MOUTH FOUR TIMES DAILY 120 tablet 0    tiZANidine (ZANAFLEX) 4 MG tablet TAKE 1 TABLET BY MOUTH EVERY 6 HOURS AS NEEDED FOR BACK PAIN 90 tablet 0    atorvastatin (LIPITOR) 40 MG tablet TAKE 1 TABLET BY MOUTH DAILY 90 tablet 0    escitalopram (LEXAPRO) 20 MG tablet TAKE 1 TABLET BY MOUTH DAILY 90 tablet 1    tolterodine (DETROL LA) 2 MG extended release capsule TAKE 1 CAPSULE BY MOUTH DAILY 90 capsule 0    esomeprazole (NEXIUM) 40 MG delayed release capsule Take 1 capsule by mouth every morning (before breakfast) 30 capsule 5    HYDROcodone-acetaminophen (NORCO) 7.5-325 MG per tablet Take 1 tablet by mouth every 6 hours as needed for Pain. omeprazole 20 MG EC tablet Take 20 mg by mouth daily       No current facility-administered medications for this visit. Allergies: Patient has no known allergies. Review of Systems  Review of Systems   Constitutional:  Negative for activity change, diaphoresis, fatigue, fever and unexpected weight change. HENT:  Negative for facial swelling and nosebleeds. Eyes:  Negative for redness and visual disturbance. Respiratory:  Negative for cough, chest tightness, shortness of breath and wheezing. Cardiovascular:  Positive for palpitations (rare, brief) and leg swelling. Negative for chest pain. Complains of elevated blood pressure   Gastrointestinal:  Negative for abdominal pain, nausea and vomiting. Endocrine: Negative for cold intolerance and heat intolerance. Genitourinary:  Negative for dysuria and hematuria. Musculoskeletal:  Negative for arthralgias and myalgias. Skin:  Negative for pallor and rash. Neurological:  Negative for dizziness, seizures, syncope, weakness, light-headedness and headaches. Hematological:  Does not bruise/bleed easily. Psychiatric/Behavioral:  Negative for agitation. The patient is not nervous/anxious. Objective  Vital Signs - BP (!) 142/80   Pulse 62   Ht 5' 1\" (1.549 m)   Wt 163 lb (73.9 kg)   BMI 30.80 kg/m²   Wt Readings from Last 3 Encounters:   02/28/23 163 lb (73.9 kg)   11/21/22 155 lb (70.3 kg)   11/14/22 159 lb (72.1 kg)       Physical Exam  Vitals and nursing note reviewed. Constitutional:       General: She is not in acute distress.      Appearance: She is well-developed. She is not diaphoretic. HENT:      Head: Normocephalic and atraumatic. Right Ear: Hearing and external ear normal.      Left Ear: Hearing and external ear normal.      Nose: Nose normal.   Eyes:      General:         Right eye: No discharge. Left eye: No discharge. Pupils: Pupils are equal, round, and reactive to light. Neck:      Thyroid: No thyromegaly. Vascular: No carotid bruit or JVD. Trachea: No tracheal deviation. Cardiovascular:      Rate and Rhythm: Normal rate and regular rhythm. Heart sounds: Normal heart sounds. No murmur heard. No friction rub. No gallop. Pulmonary:      Effort: Pulmonary effort is normal. No respiratory distress. Breath sounds: Normal breath sounds. No wheezing or rales. Abdominal:      Palpations: Abdomen is soft. Tenderness: There is no abdominal tenderness. Musculoskeletal:         General: No swelling or deformity. Cervical back: Neck supple. No muscular tenderness. Right lower leg: Edema (2+) present. Left lower leg: Edema (2+) present. Skin:     General: Skin is warm and dry. Findings: No rash. Neurological:      General: No focal deficit present. Mental Status: She is alert and oriented to person, place, and time. Cranial Nerves: No cranial nerve deficit.    Psychiatric:         Mood and Affect: Mood normal.         Behavior: Behavior normal.         Judgment: Judgment normal.       Data:  Lab Results   Component Value Date/Time    WBC 7.6 11/13/2022 03:10 PM    RBC 4.93 11/13/2022 03:10 PM    HGB 14.7 11/13/2022 03:10 PM    HCT 45.5 11/13/2022 03:10 PM     11/13/2022 03:10 PM      Lab Results   Component Value Date/Time    CHOL 292 08/09/2021 11:28 AM    TRIG 203 08/09/2021 11:28 AM    HDL 49 09/26/2022 04:13 PM    LDLCALC 129 09/26/2022 04:13 PM     Lab Results   Component Value Date/Time     11/13/2022 03:10 PM    K 4.5 11/13/2022 03:10 PM     11/13/2022 03:10 PM    CO2 27 11/13/2022 03:10 PM    GLUCOSE 98 11/13/2022 03:10 PM    BUN 16 11/13/2022 03:10 PM    CREATININE 1.2 11/13/2022 03:10 PM    CALCIUM 8.8 11/13/2022 03:10 PM    ALT 25 11/13/2022 03:10 PM    AST 17 11/13/2022 03:10 PM     Lab Results   Component Value Date/Time    TSH 0.884 03/08/2022 11:49 AM     Heart Cath 5/11/21  Conclusions      Nonobstructive CAD with ostial RCA intermediate grade stenosis. Normal LV systolic function. Recommendations      Medical management. Signatures      ----------------------------------------------------------------   Electronically signed by Shira Yan MD(Performing Physician) on   05/11/2021 15:10    Echo 5/7//21  Conclusions      Summary   Mitral valve leaflets are mildly thickened with preserved leaflet   mobility. Mild mitral regurgitation is present. Mildly thickened aortic valve leaflets with preserved leaflet mobility. Tricuspid valve is structurally normal.   Mild tricuspid regurgitation with estimated RVSP of 44 mm Hg. Mildly dilated left atrium. Normal left ventricular size with preserved LV function and an estimated   ejection fraction of approximately 55-60%. Impaired relaxation compatible with diastolic dysfunction. ( reversed E/A   ratio)   Mild concentric left ventricular hypertrophy. No regional wall motion abnormalities. Signature      ----------------------------------------------------------------   Electronically signed by Andrew Downey MD(Interpreting   physician) on 05/08/2021 10:02 AM    Zio patch heart monitor 6/15/2021  Sinus   9 brief SVT, longest 13 beats  No significant bradycardia, pauses or heart block  No atrial fibrillation    Zio patch heart monitor 3/11/2019  Worn 4 days and 12 hours  Predominantly sinus rhythm  No significant bradycardia, pauses or heart block  6 SVT, longest 9 beats        Assessment:     Diagnosis Orders   1.  Essential hypertension  valsartan (DIOVAN) 80 MG tablet DISCONTINUED: valsartan (DIOVAN) 80 MG tablet      2. Coronary artery disease, non-occlusive        3. Bradycardia        4. Mixed hyperlipidemia           Hypertension-uncontrolled. Patient is not taking her amlodipine currently. We will instead increase her valsartan to 80 mg twice a day and add HCTZ 12.5 mg daily. Return in a few weeks to see how she is doing. Monitor BP at home and bring readings to next visit. Bring in blood pressure cuff for comparison reading    CAD-history of nonocclusive disease per heart cath in 2021. She denies symptoms of angina. Bradycardia- stable in office today with heart rate of 62. History of symptomatic   bradycardia with beta-blockers in the past.  Currently on no rate controlling medications    Hyperlipidemia-stable on statin therapy with atorvastatin    Plan    Return in about 2 weeks (around 3/14/2023) for NEO. Increase Valsartan to 80mg twice a day  Add HCTZ 12.5mg daily  Bring BP cuff to next visit  Low sodium diet    Call with any questionsor concerns  Follow up with NEO Zhao for non cardiac problems  Report any new problems  Cardiovascular Fitness-Exercise as tolerated. Strive for 15 minutes of exercise most days of the week. Cardiac / HealthyDiet  Continue current medications as directed  Continue plan of treatment  It is always recommended that you bring your medicationsbottles with you to each visit - this is for your safety!        NEO Harris

## 2023-02-28 NOTE — PATIENT INSTRUCTIONS
Return in about 2 weeks (around 3/14/2023) for APRN. Increase Valsartan to 80mg twice a day.   If top blood pressure number is less than 90, can hold valsartan    Add HCTZ 12.5mg daily    Bring BP cuff to next visit  Low sodium diet

## 2023-03-15 ENCOUNTER — TELEPHONE (OUTPATIENT)
Dept: CARDIOLOGY CLINIC | Age: 78
End: 2023-03-15

## 2023-03-16 ENCOUNTER — OFFICE VISIT (OUTPATIENT)
Dept: CARDIOLOGY CLINIC | Age: 78
End: 2023-03-16
Payer: MEDICARE

## 2023-03-16 VITALS
HEIGHT: 61 IN | HEART RATE: 69 BPM | OXYGEN SATURATION: 98 % | DIASTOLIC BLOOD PRESSURE: 58 MMHG | WEIGHT: 163 LBS | SYSTOLIC BLOOD PRESSURE: 104 MMHG | BODY MASS INDEX: 30.78 KG/M2

## 2023-03-16 DIAGNOSIS — I25.10 CORONARY ARTERY DISEASE, NON-OCCLUSIVE: ICD-10-CM

## 2023-03-16 DIAGNOSIS — I10 ESSENTIAL HYPERTENSION: Primary | ICD-10-CM

## 2023-03-16 DIAGNOSIS — R42 DIZZINESS: ICD-10-CM

## 2023-03-16 DIAGNOSIS — R00.1 BRADYCARDIA: ICD-10-CM

## 2023-03-16 PROCEDURE — 1123F ACP DISCUSS/DSCN MKR DOCD: CPT | Performed by: CLINICAL NURSE SPECIALIST

## 2023-03-16 PROCEDURE — 99214 OFFICE O/P EST MOD 30 MIN: CPT | Performed by: CLINICAL NURSE SPECIALIST

## 2023-03-16 PROCEDURE — 3074F SYST BP LT 130 MM HG: CPT | Performed by: CLINICAL NURSE SPECIALIST

## 2023-03-16 PROCEDURE — 3078F DIAST BP <80 MM HG: CPT | Performed by: CLINICAL NURSE SPECIALIST

## 2023-03-16 RX ORDER — HYDROCHLOROTHIAZIDE 12.5 MG/1
12.5 CAPSULE, GELATIN COATED ORAL DAILY PRN
Qty: 90 CAPSULE | Refills: 3
Start: 2023-03-16

## 2023-03-16 ASSESSMENT — ENCOUNTER SYMPTOMS
FACIAL SWELLING: 0
EYE REDNESS: 0
COUGH: 0
SHORTNESS OF BREATH: 0
VOMITING: 0
ABDOMINAL PAIN: 0
CHEST TIGHTNESS: 0
WHEEZING: 0
NAUSEA: 0

## 2023-03-16 NOTE — PROGRESS NOTES
[facility] [address]  [address]  Phone: [phone]  Fax: [phone]    OFFICE VISIT:  3/16/2023    Brijesh Merino - : 1945    Reason For Visit:  Tevin Verde is a 68 y.o. female who is here for Follow-up (Patient states she is extremely dizzy to the point she is sick to her stomach. ) and Hypertension       Diagnosis Orders   1. Essential hypertension        2. Dizziness        3. Bradycardia        4. Coronary artery disease, non-occlusive              HPI  Patient was initially seen in our office with complaints of palpitations and brief SVT on heart monitor in , nonobstructive CAD (per cath ), hypertension, bradycardia    In the fall, patient was seen in our office for elevated blood pressure. Amlodipine was started and readings improved. As time has gone on, she basically stopped taking the amlodipine because she was concerned that it might be making her blood pressure too low. If her blood pressure was in the 130-140 range or less, she was not taking the medication and eventually just stopped it    At recent visit on 2023, we had her stay off the amlodipine completely and increase her valsartan to twice a day instead of once a day and HCTZ was added. She feels her lower extremity edema has improved but now she is feeling dizzy. There is some history of vertigo. She brings in a list of blood pressure readings that are much higher than the readings we see in the office. She did not bring in her blood pressure cuff for comparison reading today      NEO Jonas is PCP.   Brijesh Mreino has the following history as recorded in [facility]:    Patient Active Problem List    Diagnosis Date Noted    Vertigo 2021    Symptomatic bradycardia 2021    Essential hypertension 2019    Hyperlipidemia 2018    Abnormal urination 2018    Female stress incontinence 2017    Prolapse of vaginal vault after hysterectomy 2017    Rectocele 2017    Enterocele 2017    Cystocele, midline 2017     Past Medical History:   Diagnosis Date    Arthritis     Atrial fibrillation (HCC)     Carpal tunnel syndrome     x2 right hand    Depression     History of blood clots     behind left knee    History of vein stripping     left leg    Hyperlipidemia     Hypertension     Neurofibromatosis (HCC)     Polio     as a child    Tachycardia      Past Surgical History:   Procedure Laterality Date    ANKLE SURGERY Left     3 surgeries    APPENDECTOMY      BLADDER SURGERY      x 2    CARDIAC CATHETERIZATION  2021    Cx 20%. RCA 55%    CARPAL TUNNEL RELEASE Right     COLONOSCOPY  2019    Dr ZONIA Munguia Co-Retained stool, TVA, (-) dysplasia x 1, HP x 1, 3 yr recall    HERNIA REPAIR      HYSTERECTOMY (CERVIX STATUS UNKNOWN)      MASTECTOMY, BILATERAL Bilateral     2010 had implants redone    SINUS SURGERY      UPPER GASTROINTESTINAL ENDOSCOPY  2019    Dr ZONIA Munguia Co-Duodenitis, gastric antral ulcer, gastritis, repeat in 3 months    UPPER GASTROINTESTINAL ENDOSCOPY N/A 2020    Dr ZONIA Alcantar-Esophagitis, gastritis, well healed gastric ulceration      Family History   Problem Relation Age of Onset    Hypertension Father     Stroke Father          age 97    Brain Cancer Mother     Colon Cancer Neg Hx     Colon Polyps Neg Hx      Social History     Tobacco Use    Smoking status: Former     Packs/day: 1.00     Years: 24.00     Pack years: 24.00     Types: Cigarettes     Quit date:      Years since quittin.2    Smokeless tobacco: Never   Substance Use Topics    Alcohol use: Yes     Comment: Occasional.      Current Outpatient Medications   Medication Sig Dispense Refill    hydroCHLOROthiazide (MICROZIDE) 12.5 MG capsule Take 1 capsule by mouth daily as needed (swelling) 90 capsule 3    valsartan (DIOVAN) 80 MG tablet Take 1 tablet by mouth 2 times daily 180 tablet 3     sucralfate (CARAFATE) 1 GM tablet TAKE 1 TABLET BY MOUTH FOUR TIMES DAILY 120 tablet 0    tiZANidine (ZANAFLEX) 4 MG tablet TAKE 1 TABLET BY MOUTH EVERY 6 HOURS AS NEEDED FOR BACK PAIN 90 tablet 0    atorvastatin (LIPITOR) 40 MG tablet TAKE 1 TABLET BY MOUTH DAILY 90 tablet 0    escitalopram (LEXAPRO) 20 MG tablet TAKE 1 TABLET BY MOUTH DAILY 90 tablet 1    tolterodine (DETROL LA) 2 MG extended release capsule TAKE 1 CAPSULE BY MOUTH DAILY 90 capsule 0    esomeprazole (NEXIUM) 40 MG delayed release capsule Take 1 capsule by mouth every morning (before breakfast) 30 capsule 5    HYDROcodone-acetaminophen (NORCO) 7.5-325 MG per tablet Take 1 tablet by mouth every 6 hours as needed for Pain. omeprazole 20 MG EC tablet Take 20 mg by mouth daily       No current facility-administered medications for this visit. Allergies: Patient has no known allergies. Review of Systems  Review of Systems   Constitutional:  Negative for activity change, diaphoresis, fatigue, fever and unexpected weight change. HENT:  Negative for facial swelling and nosebleeds. Eyes:  Negative for redness and visual disturbance. Respiratory:  Negative for cough, chest tightness, shortness of breath and wheezing. Cardiovascular:  Negative for chest pain, palpitations and leg swelling. Complains of elevated blood pressure   Gastrointestinal:  Negative for abdominal pain, nausea and vomiting. Endocrine: Negative for cold intolerance and heat intolerance. Genitourinary:  Negative for dysuria and hematuria. Musculoskeletal:  Negative for arthralgias and myalgias. Skin:  Negative for pallor and rash. Neurological:  Positive for dizziness. Negative for seizures, syncope, weakness, light-headedness and headaches. Hematological:  Does not bruise/bleed easily. Psychiatric/Behavioral:  Negative for agitation. The patient is not nervous/anxious.       Objective  Vital Signs - BP (!) 104/58   Pulse 69   Ht 5' 1\" (1.549 m) Wt 163 lb (73.9 kg)   SpO2 98%   BMI 30.80 kg/m²   Wt Readings from Last 3 Encounters:   03/16/23 163 lb (73.9 kg)   02/28/23 163 lb (73.9 kg)   11/21/22 155 lb (70.3 kg)       Physical Exam  Vitals and nursing note reviewed. Constitutional:       General: She is not in acute distress. Appearance: She is well-developed. She is not diaphoretic. HENT:      Head: Normocephalic and atraumatic. Right Ear: Hearing and external ear normal.      Left Ear: Hearing and external ear normal.      Nose: Nose normal.   Eyes:      General:         Right eye: No discharge. Left eye: No discharge. Pupils: Pupils are equal, round, and reactive to light. Neck:      Thyroid: No thyromegaly. Vascular: No carotid bruit or JVD. Trachea: No tracheal deviation. Cardiovascular:      Rate and Rhythm: Normal rate and regular rhythm. Heart sounds: Normal heart sounds. No murmur heard. No friction rub. No gallop. Pulmonary:      Effort: Pulmonary effort is normal. No respiratory distress. Breath sounds: Normal breath sounds. No wheezing or rales. Abdominal:      Palpations: Abdomen is soft. Tenderness: There is no abdominal tenderness. Musculoskeletal:         General: No swelling or deformity. Cervical back: Neck supple. No muscular tenderness. Right lower leg: Edema (trace) present. Left lower leg: Edema (trace) present. Skin:     General: Skin is warm and dry. Findings: No rash. Neurological:      General: No focal deficit present. Mental Status: She is alert and oriented to person, place, and time. Cranial Nerves: No cranial nerve deficit.    Psychiatric:         Mood and Affect: Mood normal.         Behavior: Behavior normal.         Judgment: Judgment normal.       Data:  Lab Results   Component Value Date/Time    WBC 7.6 11/13/2022 03:10 PM    RBC 4.93 11/13/2022 03:10 PM    HGB 14.7 11/13/2022 03:10 PM    HCT 45.5 11/13/2022 03:10 PM  11/13/2022 03:10 PM      Lab Results   Component Value Date/Time    CHOL 292 08/09/2021 11:28 AM    TRIG 203 08/09/2021 11:28 AM    HDL 49 09/26/2022 04:13 PM    LDLCALC 129 09/26/2022 04:13 PM     Lab Results   Component Value Date/Time     11/13/2022 03:10 PM    K 4.5 11/13/2022 03:10 PM     11/13/2022 03:10 PM    CO2 27 11/13/2022 03:10 PM    GLUCOSE 98 11/13/2022 03:10 PM    BUN 16 11/13/2022 03:10 PM    CREATININE 1.2 11/13/2022 03:10 PM    CALCIUM 8.8 11/13/2022 03:10 PM    ALT 25 11/13/2022 03:10 PM    AST 17 11/13/2022 03:10 PM     Lab Results   Component Value Date/Time    TSH 0.884 03/08/2022 11:49 AM     Heart Cath 5/11/21  Conclusions      Nonobstructive CAD with ostial RCA intermediate grade stenosis. Normal LV systolic function. Recommendations      Medical management. Signatures      ----------------------------------------------------------------   Electronically signed by Uri Yan MD(Performing Physician) on   05/11/2021 15:10    Echo 5/7//21  Conclusions      Summary   Mitral valve leaflets are mildly thickened with preserved leaflet   mobility. Mild mitral regurgitation is present. Mildly thickened aortic valve leaflets with preserved leaflet mobility. Tricuspid valve is structurally normal.   Mild tricuspid regurgitation with estimated RVSP of 44 mm Hg. Mildly dilated left atrium. Normal left ventricular size with preserved LV function and an estimated   ejection fraction of approximately 55-60%. Impaired relaxation compatible with diastolic dysfunction. ( reversed E/A   ratio)   Mild concentric left ventricular hypertrophy. No regional wall motion abnormalities.       Signature      ----------------------------------------------------------------   Electronically signed by Manny Iasac MD(Interpreting   physician) on 05/08/2021 10:02 AM    Zio patch heart monitor 6/15/2021  Sinus   9 brief SVT, longest 13 beats  No significant bradycardia, pauses or heart block  No atrial fibrillation    Zio patch heart monitor 3/11/2019  Worn 4 days and 12 hours  Predominantly sinus rhythm  No significant bradycardia, pauses or heart block  6 SVT, longest 9 beats        Assessment:     Diagnosis Orders   1. Essential hypertension        2. Dizziness        3. Bradycardia        4. Coronary artery disease, non-occlusive            Hypertension-better control her BP in the office, but her home cuff reads much higher. She did not bring it in for comparison reading today. She is now feeling dizzy. Plan will be to stop the HCTZ and just continue valsartan 80 mg twice per day. The dizziness does not resolve in the next 2 weeks she should contact her PCP for further recommendations. She does have history of vertigo    CAD-history of nonocclusive disease per heart cath in 2021. She denies symptoms of angina. Stable    Bradycardia- stable in office today with heart rate of 69. History of symptomatic   bradycardia with beta-blockers in the past.  Currently on no rate controlling medications    Plan    Return in about 6 months (around 9/16/2023) for NEO. Stop HCTZ for now due to dizziness    Continue Valsartan twice a day    If dizziness persists, make appt with PCP      Call with any questionsor concerns  Follow up with NEO Estrada for non cardiac problems  Report any new problems  Cardiovascular Fitness-Exercise as tolerated. Strive for 15 minutes of exercise most days of the week. Cardiac / HealthyDiet  Continue current medications as directed  Continue plan of treatment  It is always recommended that you bring your medicationsbottles with you to each visit - this is for your safety!        Cristine Denver, APRN

## 2023-03-16 NOTE — PATIENT INSTRUCTIONS
Stop HCTZ for now due to dizziness    Continue Valsartan twice a day    If dizziness persists, make appt with PCP

## 2023-03-17 DIAGNOSIS — G89.29 CHRONIC BILATERAL LOW BACK PAIN WITHOUT SCIATICA: ICD-10-CM

## 2023-03-17 DIAGNOSIS — M54.50 CHRONIC BILATERAL LOW BACK PAIN WITHOUT SCIATICA: ICD-10-CM

## 2023-03-21 DIAGNOSIS — M54.50 CHRONIC BILATERAL LOW BACK PAIN WITHOUT SCIATICA: ICD-10-CM

## 2023-03-21 DIAGNOSIS — G89.29 CHRONIC BILATERAL LOW BACK PAIN WITHOUT SCIATICA: ICD-10-CM

## 2023-03-21 RX ORDER — TIZANIDINE 4 MG/1
TABLET ORAL
Qty: 90 TABLET | Refills: 0 | Status: SHIPPED | OUTPATIENT
Start: 2023-03-21

## 2023-03-21 NOTE — TELEPHONE ENCOUNTER
Kendrick Coello called to request a refill on her medication.       Last office visit : 9/26/2022   Next office visit : Visit date not found     Requested Prescriptions     Pending Prescriptions Disp Refills    tiZANidine (ZANAFLEX) 4 MG tablet 90 tablet 0     Sig: TAKE 1 TABLET BY MOUTH EVERY 6 HOURS AS NEEDED FOR BACK PAIN            Komal Saul MA

## 2023-03-22 RX ORDER — TIZANIDINE 4 MG/1
TABLET ORAL
Qty: 90 TABLET | Refills: 0 | OUTPATIENT
Start: 2023-03-22

## 2023-03-28 RX ORDER — SUCRALFATE 1 G/1
TABLET ORAL
Qty: 120 TABLET | Refills: 0 | OUTPATIENT
Start: 2023-03-28

## 2023-04-23 DIAGNOSIS — E78.2 MODERATE MIXED HYPERLIPIDEMIA NOT REQUIRING STATIN THERAPY: ICD-10-CM

## 2023-04-25 RX ORDER — ATORVASTATIN CALCIUM 40 MG/1
40 TABLET, FILM COATED ORAL DAILY
Qty: 90 TABLET | Refills: 0 | OUTPATIENT
Start: 2023-04-25

## 2023-04-25 RX ORDER — SUCRALFATE 1 G/1
TABLET ORAL
Qty: 120 TABLET | Refills: 0 | OUTPATIENT
Start: 2023-04-25

## 2023-04-27 DIAGNOSIS — I10 ESSENTIAL HYPERTENSION: ICD-10-CM

## 2023-04-27 DIAGNOSIS — F33.1 MODERATE EPISODE OF RECURRENT MAJOR DEPRESSIVE DISORDER (HCC): ICD-10-CM

## 2023-04-28 RX ORDER — VALSARTAN 80 MG/1
TABLET ORAL
Qty: 180 TABLET | Refills: 3 | Status: SHIPPED | OUTPATIENT
Start: 2023-04-28

## 2023-04-28 RX ORDER — ESCITALOPRAM OXALATE 20 MG/1
20 TABLET ORAL DAILY
Qty: 30 TABLET | Refills: 0 | Status: SHIPPED | OUTPATIENT
Start: 2023-04-28

## 2023-06-25 ENCOUNTER — HOSPITAL ENCOUNTER (EMERGENCY)
Age: 78
Discharge: HOME | End: 2023-06-25
Attending: EMERGENCY MEDICINE
Payer: MEDICARE

## 2023-06-25 VITALS
TEMPERATURE: 98.1 F | WEIGHT: 161 LBS | HEART RATE: 60 BPM | BODY MASS INDEX: 30.42 KG/M2 | DIASTOLIC BLOOD PRESSURE: 54 MMHG | SYSTOLIC BLOOD PRESSURE: 126 MMHG | RESPIRATION RATE: 18 BRPM | OXYGEN SATURATION: 96 %

## 2023-06-25 DIAGNOSIS — T50.901A ACCIDENTAL DRUG OVERDOSE, INITIAL ENCOUNTER: Primary | ICD-10-CM

## 2023-06-25 LAB
ALBUMIN SERPL-MCNC: 3.7 G/DL (ref 3.5–5.2)
ALP SERPL-CCNC: 115 U/L (ref 35–104)
ALT SERPL-CCNC: 17 U/L (ref 5–33)
ANION GAP SERPL CALCULATED.3IONS-SCNC: 8 MMOL/L (ref 7–19)
AST SERPL-CCNC: 18 U/L (ref 5–32)
BASOPHILS # BLD: 0.1 K/UL (ref 0–0.2)
BASOPHILS NFR BLD: 0.9 % (ref 0–1)
BILIRUB SERPL-MCNC: 0.4 MG/DL (ref 0.2–1.2)
BUN SERPL-MCNC: 20 MG/DL (ref 8–23)
CALCIUM SERPL-MCNC: 8.9 MG/DL (ref 8.8–10.2)
CHLORIDE SERPL-SCNC: 106 MMOL/L (ref 98–111)
CO2 SERPL-SCNC: 27 MMOL/L (ref 22–29)
CREAT SERPL-MCNC: 1 MG/DL (ref 0.5–0.9)
EOSINOPHIL # BLD: 0.2 K/UL (ref 0–0.6)
EOSINOPHIL NFR BLD: 2.6 % (ref 0–5)
ERYTHROCYTE [DISTWIDTH] IN BLOOD BY AUTOMATED COUNT: 13.8 % (ref 11.5–14.5)
GLUCOSE SERPL-MCNC: 100 MG/DL (ref 74–109)
HCT VFR BLD AUTO: 38.2 % (ref 37–47)
HGB BLD-MCNC: 12.6 G/DL (ref 12–16)
IMM GRANULOCYTES # BLD: 0 K/UL
LYMPHOCYTES # BLD: 1.6 K/UL (ref 1.1–4.5)
LYMPHOCYTES NFR BLD: 23.6 % (ref 20–40)
MCH RBC QN AUTO: 29.8 PG (ref 27–31)
MCHC RBC AUTO-ENTMCNC: 33 G/DL (ref 33–37)
MCV RBC AUTO: 90.3 FL (ref 81–99)
MONOCYTES # BLD: 0.7 K/UL (ref 0–0.9)
MONOCYTES NFR BLD: 10 % (ref 0–10)
NEUTROPHILS # BLD: 4.3 K/UL (ref 1.5–7.5)
NEUTS SEG NFR BLD: 62.5 % (ref 50–65)
PLATELET # BLD AUTO: 216 K/UL (ref 130–400)
PMV BLD AUTO: 10.7 FL (ref 9.4–12.3)
POTASSIUM SERPL-SCNC: 4.3 MMOL/L (ref 3.5–5)
PROT SERPL-MCNC: 6.6 G/DL (ref 6.6–8.7)
RBC # BLD AUTO: 4.23 M/UL (ref 4.2–5.4)
SODIUM SERPL-SCNC: 141 MMOL/L (ref 136–145)
WBC # BLD AUTO: 6.9 K/UL (ref 4.8–10.8)

## 2023-06-25 PROCEDURE — 93005 ELECTROCARDIOGRAM TRACING: CPT | Performed by: EMERGENCY MEDICINE

## 2023-06-25 PROCEDURE — 99284 EMERGENCY DEPT VISIT MOD MDM: CPT

## 2023-06-25 PROCEDURE — 36415 COLL VENOUS BLD VENIPUNCTURE: CPT

## 2023-06-25 PROCEDURE — 85025 COMPLETE CBC W/AUTO DIFF WBC: CPT

## 2023-06-25 PROCEDURE — 80053 COMPREHEN METABOLIC PANEL: CPT

## 2023-06-25 ASSESSMENT — ENCOUNTER SYMPTOMS
SORE THROAT: 0
ABDOMINAL PAIN: 0
BACK PAIN: 0
COUGH: 0
DIARRHEA: 0
VOMITING: 0
SHORTNESS OF BREATH: 0
NAUSEA: 0
RHINORRHEA: 0

## 2023-06-25 ASSESSMENT — PAIN - FUNCTIONAL ASSESSMENT: PAIN_FUNCTIONAL_ASSESSMENT: NONE - DENIES PAIN

## 2023-06-26 LAB
EKG P AXIS: 64 DEGREES
EKG P-R INTERVAL: 168 MS
EKG Q-T INTERVAL: 414 MS
EKG QRS DURATION: 86 MS
EKG QTC CALCULATION (BAZETT): 414 MS
EKG T AXIS: 96 DEGREES

## 2023-06-26 PROCEDURE — 93010 ELECTROCARDIOGRAM REPORT: CPT | Performed by: INTERNAL MEDICINE

## 2023-08-16 ENCOUNTER — OFFICE VISIT (OUTPATIENT)
Dept: ORTHOPEDIC SURGERY | Facility: CLINIC | Age: 78
End: 2023-08-16
Payer: MEDICARE

## 2023-08-16 VITALS
OXYGEN SATURATION: 94 % | SYSTOLIC BLOOD PRESSURE: 144 MMHG | HEART RATE: 70 BPM | HEIGHT: 61 IN | BODY MASS INDEX: 28.89 KG/M2 | WEIGHT: 153 LBS | DIASTOLIC BLOOD PRESSURE: 78 MMHG

## 2023-08-16 DIAGNOSIS — M65.322 TRIGGER INDEX FINGER OF LEFT HAND: Primary | ICD-10-CM

## 2023-08-16 RX ADMIN — TRIAMCINOLONE ACETONIDE 20 MG: 40 INJECTION, SUSPENSION INTRA-ARTICULAR; INTRAMUSCULAR at 14:10

## 2023-08-16 RX ADMIN — BUPIVACAINE HYDROCHLORIDE 0.5 ML: 5 INJECTION, SOLUTION EPIDURAL; INTRACAUDAL at 14:10

## 2023-08-16 NOTE — PROGRESS NOTES
"Chief Complaint  Initial Evaluation of the Right Hand (Middle Trigger)     Subjective      Bharti Nobles presents to [facility] for initial evaluation of the right hand middle trigger finger. She has a trigger finger on the middle finger.  She has some pain that goes up her forearm.  She has a cyst on the palmar aspect of the wrist.     Allergies   Allergen Reactions    Adhesive Tape Rash        Social History     Socioeconomic History    Marital status:    Tobacco Use    Smoking status: Never     Passive exposure: Never    Smokeless tobacco: Never   Vaping Use    Vaping Use: Never used   Substance and Sexual Activity    Alcohol use: No    Drug use: No        I reviewed the patient's chief complaint, history of present illness, review of systems, past medical history, surgical history, family history, social history, medications, and allergy list.     Review of Systems     Constitutional: Denies fevers, chills, weight loss  Cardiovascular: Denies chest pain, shortness of breath  Skin: Denies rashes, acute skin changes  Neurologic: Denies headache, loss of consciousness      Vital Signs:   /78   Pulse 70   Ht 154.9 cm (61\")   Wt 69.4 kg (153 lb)   SpO2 94%   BMI 28.91 kg/mý          Physical Exam  General: Alert. No acute distress    Ortho Exam        RIGHT HAND Negative Compression testing/ Negative Tinels. NegativeFinkelsteins. Negative Martinez's testing. Negative CMC grind testing. Negative Phalens. Full ROM of the hand, fingers, elbow and wrist. Positive Triggering of the digit. Sensation grossly intact to light touch, median, radial and ulnar nerve. Positive AIN, PIN and ulnar nerve motor function intact. Axillary nerve intact. Positive pulses. Cyst on the bonilla aspect of the wrist.          Right Trigger Finger  Consent given by: patient  Site marked: site marked  Timeout: Immediately prior to procedure a time out was called to verify the correct patient, " procedure, equipment, support staff and site/side marked as required   Supporting Documentation  Indications: pain   Procedure Details  Location: -   Location: Right trigger finger.Preparation: Patient was prepped and draped in the usual sterile fashion  Needle gauge: 23G.  Medications administered: 20 mg triamcinolone acetonide 40 MG/ML; 0.5 mL bupivacaine (PF) 0.5 %  Patient tolerance: patient tolerated the procedure well with no immediate complications          Imaging Results (Most Recent)       None             Result Review :             Assessment and Plan     Diagnoses and all orders for this visit:    1. Trigger index middle finger of the right hand (Primary)        Discussed the treatment plan with the patient.     Discussed the risks and benefits of trigger finger injection of the third digit. She tolerated the injection well.       Call or return if worsening symptoms.    Follow Up     PRN      Patient was given instructions and counseling regarding her condition or for health maintenance advice. Please see specific information pulled into the AVS if appropriate.     Scribed for Jesus Arana MD by Varsha Graham MA.  08/16/23   13:53 EDT    I have personally performed the services described in this document as scribed by the above individual and it is both accurate and complete. Jesus Arana MD 08/17/23

## 2023-08-17 RX ORDER — TRIAMCINOLONE ACETONIDE 40 MG/ML
20 INJECTION, SUSPENSION INTRA-ARTICULAR; INTRAMUSCULAR
Status: COMPLETED | OUTPATIENT
Start: 2023-08-16 | End: 2023-08-16

## 2023-08-17 RX ORDER — BUPIVACAINE HYDROCHLORIDE 5 MG/ML
0.5 INJECTION, SOLUTION EPIDURAL; INTRACAUDAL
Status: COMPLETED | OUTPATIENT
Start: 2023-08-16 | End: 2023-08-16

## 2023-08-29 ENCOUNTER — OFFICE VISIT (OUTPATIENT)
Dept: FAMILY MEDICINE CLINIC | Age: 78
End: 2023-08-29
Payer: MEDICARE

## 2023-08-29 VITALS
HEIGHT: 61 IN | BODY MASS INDEX: 29.15 KG/M2 | DIASTOLIC BLOOD PRESSURE: 74 MMHG | SYSTOLIC BLOOD PRESSURE: 122 MMHG | TEMPERATURE: 97.4 F | OXYGEN SATURATION: 98 % | HEART RATE: 76 BPM | WEIGHT: 154.38 LBS

## 2023-08-29 DIAGNOSIS — R10.13 EPIGASTRIC PAIN: Primary | ICD-10-CM

## 2023-08-29 DIAGNOSIS — I10 ESSENTIAL HYPERTENSION: ICD-10-CM

## 2023-08-29 DIAGNOSIS — N32.81 OVERACTIVE BLADDER: ICD-10-CM

## 2023-08-29 DIAGNOSIS — E78.2 MIXED HYPERLIPIDEMIA: ICD-10-CM

## 2023-08-29 DIAGNOSIS — E78.2 MODERATE MIXED HYPERLIPIDEMIA NOT REQUIRING STATIN THERAPY: ICD-10-CM

## 2023-08-29 LAB
ALBUMIN SERPL-MCNC: 4.3 G/DL (ref 3.5–5.2)
ALP SERPL-CCNC: 119 U/L (ref 35–104)
ALT SERPL-CCNC: 23 U/L (ref 5–33)
ANION GAP SERPL CALCULATED.3IONS-SCNC: 11 MMOL/L (ref 7–19)
AST SERPL-CCNC: 19 U/L (ref 5–32)
BASOPHILS # BLD: 0.1 K/UL (ref 0–0.2)
BASOPHILS NFR BLD: 1.1 % (ref 0–1)
BILIRUB SERPL-MCNC: 0.9 MG/DL (ref 0.2–1.2)
BUN SERPL-MCNC: 28 MG/DL (ref 8–23)
CALCIUM SERPL-MCNC: 9.7 MG/DL (ref 8.8–10.2)
CHLORIDE SERPL-SCNC: 103 MMOL/L (ref 98–111)
CHOLEST SERPL-MCNC: 275 MG/DL (ref 160–199)
CO2 SERPL-SCNC: 24 MMOL/L (ref 22–29)
CREAT SERPL-MCNC: 1.1 MG/DL (ref 0.5–0.9)
EOSINOPHIL # BLD: 0.1 K/UL (ref 0–0.6)
EOSINOPHIL NFR BLD: 1.1 % (ref 0–5)
ERYTHROCYTE [DISTWIDTH] IN BLOOD BY AUTOMATED COUNT: 13.5 % (ref 11.5–14.5)
GLUCOSE SERPL-MCNC: 86 MG/DL (ref 74–109)
HCT VFR BLD AUTO: 46.8 % (ref 37–47)
HDLC SERPL-MCNC: 53 MG/DL (ref 65–121)
HGB BLD-MCNC: 14.7 G/DL (ref 12–16)
IMM GRANULOCYTES # BLD: 0 K/UL
LDLC SERPL CALC-MCNC: 190 MG/DL
LYMPHOCYTES # BLD: 1.6 K/UL (ref 1.1–4.5)
LYMPHOCYTES NFR BLD: 20.1 % (ref 20–40)
MCH RBC QN AUTO: 30.2 PG (ref 27–31)
MCHC RBC AUTO-ENTMCNC: 31.4 G/DL (ref 33–37)
MCV RBC AUTO: 96.3 FL (ref 81–99)
MONOCYTES # BLD: 0.5 K/UL (ref 0–0.9)
MONOCYTES NFR BLD: 6.7 % (ref 0–10)
NEUTROPHILS # BLD: 5.6 K/UL (ref 1.5–7.5)
NEUTS SEG NFR BLD: 70.6 % (ref 50–65)
PLATELET # BLD AUTO: 245 K/UL (ref 130–400)
PMV BLD AUTO: 12.4 FL (ref 9.4–12.3)
POTASSIUM SERPL-SCNC: 4.1 MMOL/L (ref 3.5–5)
PROT SERPL-MCNC: 7.8 G/DL (ref 6.6–8.7)
RBC # BLD AUTO: 4.86 M/UL (ref 4.2–5.4)
SODIUM SERPL-SCNC: 138 MMOL/L (ref 136–145)
TRIGL SERPL-MCNC: 162 MG/DL (ref 0–149)
WBC # BLD AUTO: 8 K/UL (ref 4.8–10.8)

## 2023-08-29 PROCEDURE — 3078F DIAST BP <80 MM HG: CPT | Performed by: NURSE PRACTITIONER

## 2023-08-29 PROCEDURE — 3074F SYST BP LT 130 MM HG: CPT | Performed by: NURSE PRACTITIONER

## 2023-08-29 PROCEDURE — 1123F ACP DISCUSS/DSCN MKR DOCD: CPT | Performed by: NURSE PRACTITIONER

## 2023-08-29 PROCEDURE — 99214 OFFICE O/P EST MOD 30 MIN: CPT | Performed by: NURSE PRACTITIONER

## 2023-08-29 RX ORDER — TOLTERODINE 2 MG/1
4 CAPSULE, EXTENDED RELEASE ORAL DAILY
Qty: 90 CAPSULE | Refills: 1 | Status: SHIPPED | OUTPATIENT
Start: 2023-08-29

## 2023-08-29 RX ORDER — ATORVASTATIN CALCIUM 40 MG/1
40 TABLET, FILM COATED ORAL DAILY
Qty: 90 TABLET | Refills: 1 | Status: SHIPPED | OUTPATIENT
Start: 2023-08-29

## 2023-08-29 RX ORDER — SUCRALFATE 1 G/1
1 TABLET ORAL 4 TIMES DAILY
Qty: 120 TABLET | Refills: 5 | Status: SHIPPED | OUTPATIENT
Start: 2023-08-29

## 2023-08-29 SDOH — ECONOMIC STABILITY: FOOD INSECURITY: WITHIN THE PAST 12 MONTHS, YOU WORRIED THAT YOUR FOOD WOULD RUN OUT BEFORE YOU GOT MONEY TO BUY MORE.: NEVER TRUE

## 2023-08-29 SDOH — ECONOMIC STABILITY: HOUSING INSECURITY
IN THE LAST 12 MONTHS, WAS THERE A TIME WHEN YOU DID NOT HAVE A STEADY PLACE TO SLEEP OR SLEPT IN A SHELTER (INCLUDING NOW)?: NO

## 2023-08-29 SDOH — ECONOMIC STABILITY: INCOME INSECURITY: HOW HARD IS IT FOR YOU TO PAY FOR THE VERY BASICS LIKE FOOD, HOUSING, MEDICAL CARE, AND HEATING?: NOT HARD AT ALL

## 2023-08-29 SDOH — ECONOMIC STABILITY: FOOD INSECURITY: WITHIN THE PAST 12 MONTHS, THE FOOD YOU BOUGHT JUST DIDN'T LAST AND YOU DIDN'T HAVE MONEY TO GET MORE.: NEVER TRUE

## 2023-08-29 ASSESSMENT — PATIENT HEALTH QUESTIONNAIRE - PHQ9
10. IF YOU CHECKED OFF ANY PROBLEMS, HOW DIFFICULT HAVE THESE PROBLEMS MADE IT FOR YOU TO DO YOUR WORK, TAKE CARE OF THINGS AT HOME, OR GET ALONG WITH OTHER PEOPLE: 1
8. MOVING OR SPEAKING SO SLOWLY THAT OTHER PEOPLE COULD HAVE NOTICED. OR THE OPPOSITE, BEING SO FIGETY OR RESTLESS THAT YOU HAVE BEEN MOVING AROUND A LOT MORE THAN USUAL: 0
SUM OF ALL RESPONSES TO PHQ QUESTIONS 1-9: 3
SUM OF ALL RESPONSES TO PHQ QUESTIONS 1-9: 3
7. TROUBLE CONCENTRATING ON THINGS, SUCH AS READING THE NEWSPAPER OR WATCHING TELEVISION: 0
SUM OF ALL RESPONSES TO PHQ9 QUESTIONS 1 & 2: 1
4. FEELING TIRED OR HAVING LITTLE ENERGY: 1
SUM OF ALL RESPONSES TO PHQ QUESTIONS 1-9: 3
5. POOR APPETITE OR OVEREATING: 0
3. TROUBLE FALLING OR STAYING ASLEEP: 1
9. THOUGHTS THAT YOU WOULD BE BETTER OFF DEAD, OR OF HURTING YOURSELF: 0
1. LITTLE INTEREST OR PLEASURE IN DOING THINGS: 0
2. FEELING DOWN, DEPRESSED OR HOPELESS: 1
SUM OF ALL RESPONSES TO PHQ QUESTIONS 1-9: 3
6. FEELING BAD ABOUT YOURSELF - OR THAT YOU ARE A FAILURE OR HAVE LET YOURSELF OR YOUR FAMILY DOWN: 0

## 2023-08-29 ASSESSMENT — ENCOUNTER SYMPTOMS
COUGH: 0
ABDOMINAL PAIN: 1
SHORTNESS OF BREATH: 0
BACK PAIN: 0

## 2023-08-29 NOTE — PROGRESS NOTES
SUBJECTIVE:  Stomach pain  Patient ID: Linsey Rodriguez is a 66 y.o. female. HPI:   HPI   Staying up in [address]. Staying in other home because she says it is easier to cool than when she has here locally is more difficult  History of stomach ulcers. And has been on Nexium and Carafate. Improved and so the Carafate was stopped but now pain is back. States that the insurance stopped paying for the Nexium so she is buying over-the-counter omeprazole which is a 20 mg tablet having lots of stomach pain and heartburn she said she did actually have ulcers in the past twice history of she had H. pylori but she does not recall that type of bacteria  Heartburn with eating. Appetite has decreased due to the heartburn and just feeling full and bloated  Getting OTC Omeprazole   Is not taking her BP med due to blood pressure running low. Controlled. Bp ascencion and has lost weight. No long swelling feet. Several of her medications she is no longer taking due to her blood pressure is lower as she is not having any problems with swelling so she has not taken the Microzide nor the Diovan she is stopped taking the cholesterol agent were going to check levels here today we may have to restart it    Past Medical History:   Diagnosis Date    Arthritis     Atrial fibrillation (HCC)     Carpal tunnel syndrome     x2 right hand    Depression     History of blood clots     behind left knee    History of vein stripping     left leg    Hyperlipidemia     Hypertension     Neurofibromatosis ([address])     Polio     as a child    Tachycardia       Prior to Visit Medications    Medication Sig Taking?  Authorizing Provider   sucralfate (CARAFATE) 1 GM tablet Take 1 tablet by mouth 4 times daily Yes NEO Burrows   tolterodine (DETROL LA) 2 MG extended release capsule Take 2 capsules by mouth daily Yes NEO Burrows   escitalopram (LEXAPRO) 20 MG tablet TAKE 1 TABLET BY MOUTH DAILY Yes NEO Bauer   tiZANidine

## 2023-09-10 ENCOUNTER — HOSPITAL ENCOUNTER (EMERGENCY)
Facility: HOSPITAL | Age: 78
Discharge: HOME | End: 2023-09-10
Attending: EMERGENCY MEDICINE | Admitting: EMERGENCY MEDICINE
Payer: MEDICARE

## 2023-09-10 ENCOUNTER — APPOINTMENT (OUTPATIENT)
Dept: GENERAL RADIOLOGY | Facility: HOSPITAL | Age: 78
End: 2023-09-10
Payer: MEDICARE

## 2023-09-10 VITALS
RESPIRATION RATE: 18 BRPM | OXYGEN SATURATION: 97 % | SYSTOLIC BLOOD PRESSURE: 158 MMHG | DIASTOLIC BLOOD PRESSURE: 68 MMHG | BODY MASS INDEX: 29.01 KG/M2 | HEIGHT: 61 IN | WEIGHT: 153.66 LBS | TEMPERATURE: 98 F | HEART RATE: 67 BPM

## 2023-09-10 DIAGNOSIS — J20.9 ACUTE BRONCHITIS, UNSPECIFIED ORGANISM: Primary | ICD-10-CM

## 2023-09-10 LAB
ALBUMIN SERPL-MCNC: 3.9 G/DL (ref 3.5–5.2)
ALBUMIN/GLOB SERPL: 1.3 G/DL
ALP SERPL-CCNC: 106 U/L (ref 39–117)
ALT SERPL W P-5'-P-CCNC: 23 U/L (ref 1–33)
ANION GAP SERPL CALCULATED.3IONS-SCNC: 12.5 MMOL/L (ref 5–15)
AST SERPL-CCNC: 18 U/L (ref 1–32)
BASOPHILS # BLD AUTO: 0.04 10*3/MM3 (ref 0–0.2)
BASOPHILS NFR BLD AUTO: 0.6 % (ref 0–1.5)
BILIRUB SERPL-MCNC: 0.7 MG/DL (ref 0–1.2)
BUN SERPL-MCNC: 16 MG/DL (ref 8–23)
BUN/CREAT SERPL: 17 (ref 7–25)
CALCIUM SPEC-SCNC: 9 MG/DL (ref 8.6–10.5)
CHLORIDE SERPL-SCNC: 106 MMOL/L (ref 98–107)
CO2 SERPL-SCNC: 23.5 MMOL/L (ref 22–29)
CREAT SERPL-MCNC: 0.94 MG/DL (ref 0.57–1)
D-LACTATE SERPL-SCNC: 1 MMOL/L (ref 0.5–2)
DEPRECATED RDW RBC AUTO: 46 FL (ref 37–54)
EGFRCR SERPLBLD CKD-EPI 2021: 62.2 ML/MIN/1.73
EOSINOPHIL # BLD AUTO: 0.11 10*3/MM3 (ref 0–0.4)
EOSINOPHIL NFR BLD AUTO: 1.6 % (ref 0.3–6.2)
ERYTHROCYTE [DISTWIDTH] IN BLOOD BY AUTOMATED COUNT: 13.5 % (ref 12.3–15.4)
FLUAV AG NPH QL: NEGATIVE
FLUBV AG NPH QL IA: NEGATIVE
GLOBULIN UR ELPH-MCNC: 3 GM/DL
GLUCOSE SERPL-MCNC: 116 MG/DL (ref 65–99)
HCT VFR BLD AUTO: 43.8 % (ref 34–46.6)
HGB BLD-MCNC: 13.9 G/DL (ref 12–15.9)
HOLD SPECIMEN: NORMAL
HOLD SPECIMEN: NORMAL
IMM GRANULOCYTES # BLD AUTO: 0.03 10*3/MM3 (ref 0–0.05)
IMM GRANULOCYTES NFR BLD AUTO: 0.4 % (ref 0–0.5)
LYMPHOCYTES # BLD AUTO: 1.01 10*3/MM3 (ref 0.7–3.1)
LYMPHOCYTES NFR BLD AUTO: 14.3 % (ref 19.6–45.3)
MCH RBC QN AUTO: 29.4 PG (ref 26.6–33)
MCHC RBC AUTO-ENTMCNC: 31.7 G/DL (ref 31.5–35.7)
MCV RBC AUTO: 92.8 FL (ref 79–97)
MONOCYTES # BLD AUTO: 0.44 10*3/MM3 (ref 0.1–0.9)
MONOCYTES NFR BLD AUTO: 6.3 % (ref 5–12)
NEUTROPHILS NFR BLD AUTO: 5.41 10*3/MM3 (ref 1.7–7)
NEUTROPHILS NFR BLD AUTO: 76.8 % (ref 42.7–76)
NRBC BLD AUTO-RTO: 0 /100 WBC (ref 0–0.2)
NT-PROBNP SERPL-MCNC: 504.3 PG/ML (ref 0–1800)
PLATELET # BLD AUTO: 198 10*3/MM3 (ref 140–450)
PMV BLD AUTO: 10.4 FL (ref 6–12)
POTASSIUM SERPL-SCNC: 3.7 MMOL/L (ref 3.5–5.2)
PROT SERPL-MCNC: 6.9 G/DL (ref 6–8.5)
RBC # BLD AUTO: 4.72 10*6/MM3 (ref 3.77–5.28)
SARS-COV-2 RNA RESP QL NAA+PROBE: NOT DETECTED
SODIUM SERPL-SCNC: 142 MMOL/L (ref 136–145)
TROPONIN T SERPL HS-MCNC: 11 NG/L
TROPONIN T SERPL HS-MCNC: 12 NG/L
WBC NRBC COR # BLD: 7.04 10*3/MM3 (ref 3.4–10.8)
WHOLE BLOOD HOLD COAG: NORMAL
WHOLE BLOOD HOLD SPECIMEN: NORMAL

## 2023-09-10 PROCEDURE — 93005 ELECTROCARDIOGRAM TRACING: CPT | Performed by: EMERGENCY MEDICINE

## 2023-09-10 PROCEDURE — 94799 UNLISTED PULMONARY SVC/PX: CPT

## 2023-09-10 PROCEDURE — 87635 SARS-COV-2 COVID-19 AMP PRB: CPT | Performed by: EMERGENCY MEDICINE

## 2023-09-10 PROCEDURE — 85025 COMPLETE CBC W/AUTO DIFF WBC: CPT

## 2023-09-10 PROCEDURE — 93005 ELECTROCARDIOGRAM TRACING: CPT

## 2023-09-10 PROCEDURE — 87804 INFLUENZA ASSAY W/OPTIC: CPT | Performed by: EMERGENCY MEDICINE

## 2023-09-10 PROCEDURE — 99284 EMERGENCY DEPT VISIT MOD MDM: CPT

## 2023-09-10 PROCEDURE — 84484 ASSAY OF TROPONIN QUANT: CPT | Performed by: EMERGENCY MEDICINE

## 2023-09-10 PROCEDURE — 94640 AIRWAY INHALATION TREATMENT: CPT

## 2023-09-10 PROCEDURE — 84484 ASSAY OF TROPONIN QUANT: CPT

## 2023-09-10 PROCEDURE — 80053 COMPREHEN METABOLIC PANEL: CPT

## 2023-09-10 PROCEDURE — 83605 ASSAY OF LACTIC ACID: CPT | Performed by: EMERGENCY MEDICINE

## 2023-09-10 PROCEDURE — 71045 X-RAY EXAM CHEST 1 VIEW: CPT

## 2023-09-10 PROCEDURE — 96374 THER/PROPH/DIAG INJ IV PUSH: CPT

## 2023-09-10 PROCEDURE — 25010000002 METHYLPREDNISOLONE PER 125 MG: Performed by: EMERGENCY MEDICINE

## 2023-09-10 PROCEDURE — 83880 ASSAY OF NATRIURETIC PEPTIDE: CPT

## 2023-09-10 PROCEDURE — 36415 COLL VENOUS BLD VENIPUNCTURE: CPT

## 2023-09-10 RX ORDER — TIZANIDINE 4 MG/1
4 TABLET ORAL NIGHTLY PRN
COMMUNITY

## 2023-09-10 RX ORDER — BENZONATATE 100 MG/1
200 CAPSULE ORAL ONCE
Status: COMPLETED | OUTPATIENT
Start: 2023-09-10 | End: 2023-09-10

## 2023-09-10 RX ORDER — AZITHROMYCIN 250 MG/1
TABLET, FILM COATED ORAL
Qty: 6 TABLET | Refills: 0 | Status: SHIPPED | OUTPATIENT
Start: 2023-09-10

## 2023-09-10 RX ORDER — METHYLPREDNISOLONE SODIUM SUCCINATE 125 MG/2ML
125 INJECTION, POWDER, LYOPHILIZED, FOR SOLUTION INTRAMUSCULAR; INTRAVENOUS ONCE
Status: COMPLETED | OUTPATIENT
Start: 2023-09-10 | End: 2023-09-10

## 2023-09-10 RX ORDER — ALBUTEROL SULFATE 2.5 MG/3ML
5 SOLUTION RESPIRATORY (INHALATION) CONTINUOUS
Status: DISCONTINUED | OUTPATIENT
Start: 2023-09-10 | End: 2023-09-10 | Stop reason: HOSPADM

## 2023-09-10 RX ORDER — BENZONATATE 200 MG/1
200 CAPSULE ORAL 3 TIMES DAILY PRN
Qty: 20 CAPSULE | Refills: 0 | Status: SHIPPED | OUTPATIENT
Start: 2023-09-10

## 2023-09-10 RX ORDER — SODIUM CHLORIDE 0.9 % (FLUSH) 0.9 %
10 SYRINGE (ML) INJECTION AS NEEDED
Status: DISCONTINUED | OUTPATIENT
Start: 2023-09-10 | End: 2023-09-10 | Stop reason: HOSPADM

## 2023-09-10 RX ADMIN — ALBUTEROL SULFATE 5 MG: 2.5 SOLUTION RESPIRATORY (INHALATION) at 16:13

## 2023-09-10 RX ADMIN — METHYLPREDNISOLONE SODIUM SUCCINATE 125 MG: 125 INJECTION INTRAMUSCULAR; INTRAVENOUS at 17:11

## 2023-09-10 RX ADMIN — BENZONATATE 200 MG: 100 CAPSULE ORAL at 17:08

## 2023-09-10 NOTE — DISCHARGE INSTRUCTIONS
Your chest x-ray look normal and your lab work looked good and you tested negative for flu and COVID virus.    You probably have a different type of viral illness that typically will run its course throughout the week with time and rest but just to be safe we can try some Z-Pawel antibiotics in case you are getting bronchitis and you can use the cough medicine and follow-up with your doctor.

## 2023-09-10 NOTE — ED PROVIDER NOTES
Time: 4:25 PM EDT  Date of encounter:  9/10/2023  Independent Historian/Clinical History and Information was obtained by:   Patient and Family    History is limited by: N/A    Chief Complaint: Off and congestion x2 days      History of Present Illness:  Patient is a 78 y.o. year old female with history of bronchitis and pneumonia who presents to the emergency department for evaluation of cough with clearish yellow sputum and nasal congestion and feeling poorly for the past couple days.    It sounds like her family member at the bedside is just actually getting over similar symptoms.    She denies any fevers, no chest pain, no vomiting or diarrhea or dysuria.        HPI    Patient Care Team  Primary Care Provider: Jessica Shah APRN    Past Medical History:     Allergies   Allergen Reactions    Adhesive Tape Rash     Past Medical History:   Diagnosis Date    Bradycardia     Depression     Hyperlipidemia     Hypertension      Past Surgical History:   Procedure Laterality Date    APPENDECTOMY      BREAST AUGMENTATION      CARDIAC CATHETERIZATION      HYSTERECTOMY      MASTECTOMY      TONSILLECTOMY       History reviewed. No pertinent family history.    Home Medications:  Prior to Admission medications    Medication Sig Start Date End Date Taking? Authorizing Provider   sucralfate (CARAFATE) 1 g tablet Take 1 tablet by mouth 4 (Four) Times a Day. 11/3/22  Yes ProviderTessa MD   tiZANidine (ZANAFLEX) 4 MG tablet Take 1 tablet by mouth At Night As Needed for Muscle Spasms.   Yes Tessa Raymond MD   amLODIPine (NORVASC) 5 MG tablet Take 1 tablet by mouth Daily.  Patient not taking: Reported on 9/10/2023 11/14/22   Tessa Raymond MD   escitalopram (LEXAPRO) 20 MG tablet Take 1 tablet by mouth Daily.  Patient not taking: Reported on 9/10/2023 11/7/22   Tessa Raymond MD   HYDROcodone-acetaminophen (NORCO) 5-325 MG per tablet Take 1 tablet by mouth Every 4 (Four) Hours As Needed for  "moderate pain (4-6) for up to 12 doses.  Patient not taking: Reported on 9/10/2023 10/14/16   Charli French MD   omeprazole OTC (PriLOSEC OTC) 20 MG EC tablet Take 1 tablet by mouth.  Patient not taking: Reported on 9/10/2023    Tessa Raymond MD   ondansetron ODT (ZOFRAN-ODT) 4 MG disintegrating tablet Take 1 tablet by mouth 4 (Four) Times a Day As Needed for nausea or vomiting.  Patient not taking: Reported on 8/16/2023 10/14/16   Charli French MD   pravastatin (PRAVACHOL) 20 MG tablet Take 1 tablet by mouth Daily.  Patient not taking: Reported on 9/10/2023    Tessa Raymond MD   tolterodine LA (DETROL LA) 2 MG 24 hr capsule Take 1 capsule by mouth Daily.  Patient not taking: Reported on 9/10/2023 9/27/22   Tessa Raymond MD   valsartan (DIOVAN) 80 MG tablet Take 1 tablet by mouth Daily.  Patient not taking: Reported on 9/10/2023 11/21/22   Tessa Raymond MD        Social History:   Social History     Tobacco Use    Smoking status: Never     Passive exposure: Never    Smokeless tobacco: Never   Vaping Use    Vaping Use: Never used   Substance Use Topics    Alcohol use: No    Drug use: No         Review of Systems:  Review of Systems   I performed a 10 point review of systems which was all negative, except for the positives found in the HPI above.  Physical Exam:  /68 (BP Location: Left arm)   Pulse 67   Temp 98 °F (36.7 °C) (Oral)   Resp 18   Ht 154.9 cm (61\")   Wt 69.7 kg (153 lb 10.6 oz)   SpO2 97%   BMI 29.03 kg/m²     Physical Exam   General: Awake alert and in no obvious distress, looks to be feeling generally unwell and sounds congested, occasional coughing    HEENT: Head normocephalic atraumatic, eyes PERRLA EOMI, nose normal, oropharynx normal.    Neck: Supple full range of motion, no meningismus, no lymphadenopathy    Heart: Regular rate and rhythm, no murmurs or rubs, 2+ radial pulses bilaterally    Lungs: Clear to auscultation bilaterally without " wheezes or crackles, no respiratory distress    Abdomen: Soft, nontender, nondistended, no rebound or guarding    Skin: Warm, dry, no rash    Musculoskeletal: Normal range of motion, no lower extremity edema    Neurologic: Oriented x3, no motor deficits no sensory deficits    Psychiatric: Mood appears stable, no psychosis          Procedures:  Procedures      Medical Decision Making:      Comorbidities that affect care:    Hypertension    External Notes reviewed:    None      The following orders were placed and all results were independently analyzed by me:  Orders Placed This Encounter   Procedures    Influenza Antigen, Rapid - Swab, Nasopharynx    COVID-19,CEPHEID/SAVANNAH,COR/MORENITA/PAD/JUAN/MAD IN-HOUSE(OR EMERGENT/ADD-ON),NP SWAB IN TRANSPORT MEDIA 3-4 HR TAT, RT-PCR - Swab, Nasopharynx    XR Chest 1 View    Tryon Draw    Comprehensive Metabolic Panel    BNP    Single High Sensitivity Troponin T    CBC Auto Differential    Lactic Acid, Plasma    High Sensitivity Troponin T    High Sensitivity Troponin T 2Hr    NPO Diet NPO Type: Strict NPO    Undress & Gown    Continuous Pulse Oximetry    Vital Signs    Oxygen Therapy- Nasal Cannula; Titrate 1-6 LPM Per SpO2; 90 - 95%    ECG 12 Lead ED Triage Standing Order; SOA    Insert Peripheral IV    CBC & Differential    Green Top (Gel)    Lavender Top    Gold Top - SST    Light Blue Top       Medications Given in the Emergency Department:  Medications   sodium chloride 0.9 % flush 10 mL (has no administration in time range)   albuterol (PROVENTIL) nebulizer solution 0.083% 2.5 mg/3mL (5 mg Nebulization New Bag 9/10/23 1613)   methylPREDNISolone sodium succinate (SOLU-Medrol) injection 125 mg (125 mg Intravenous Given 9/10/23 1711)   benzonatate (TESSALON) capsule 200 mg (200 mg Oral Given 9/10/23 1708)        ED Course:    ED Course as of 09/10/23 1757   Sun Sep 10, 2023   1615 EKG: Interpreted her twelve-lead EKG as normal sinus rhythm at 65 bpm, normal P waves, normal QRS,  normal ST segments, there are inverted T waves in the anterior leads that could represent ischemia. [VS]      ED Course User Index  [VS] Neil Henry MD       Labs:    Lab Results (last 24 hours)       Procedure Component Value Units Date/Time    CBC & Differential [[id number]]  (Abnormal) Collected: 09/10/23 1339    Specimen: Blood from Arm, Right Updated: 09/10/23 5603    Narrative:      The following orders were created for panel order CBC & Differential.  Procedure                               Abnormality         Status                     ---------                               -----------         ------                     CBC Auto Differential[[id number]]        Abnormal            Final result                 Please view results for these tests on the individual orders.    Comprehensive Metabolic Panel [[id number]]  (Abnormal) Collected: 09/10/23 1339    Specimen: Blood from Arm, Right Updated: 09/10/23 1405     Glucose 116 mg/dL      BUN 16 mg/dL      Creatinine 0.94 mg/dL      Sodium 142 mmol/L      Potassium 3.7 mmol/L      Chloride 106 mmol/L      CO2 23.5 mmol/L      Calcium 9.0 mg/dL      Total Protein 6.9 g/dL      Albumin 3.9 g/dL      ALT (SGPT) 23 U/L      AST (SGOT) 18 U/L      Alkaline Phosphatase 106 U/L      Total Bilirubin 0.7 mg/dL      Globulin 3.0 gm/dL      A/G Ratio 1.3 g/dL      BUN/Creatinine Ratio 17.0     Anion Gap 12.5 mmol/L      eGFR 62.2 mL/min/1.73     Narrative:      GFR Normal >60  Chronic Kidney Disease <60  Kidney Failure <15    The GFR formula is only valid for adults with stable renal function between ages 18 and 70.    BNP [[id number]]  (Normal) Collected: 09/10/23 1339    Specimen: Blood from Arm, Right Updated: 09/10/23 1403     proBNP 504.3 pg/mL     Narrative:      Among patients with dyspnea, NT-proBNP is highly sensitive for the detection of acute congestive heart failure. In addition NT-proBNP of <300 pg/ml effectively rules out acute congestive heart failure with  99% negative predictive value.      Single High Sensitivity Troponin T [[id number]]  (Abnormal) Collected: 09/10/23 1339    Specimen: Blood from Arm, Right Updated: 09/10/23 1405     HS Troponin T 11 ng/L     Narrative:      High Sensitive Troponin T Reference Range:  <10.0 ng/L- Negative Female for AMI  <15.0 ng/L- Negative Male for AMI  >=10 - Abnormal Female indicating possible myocardial injury.  >=15 - Abnormal Male indicating possible myocardial injury.   Clinicians would have to utilize clinical acumen, EKG, Troponin, and serial changes to determine if it is an Acute Myocardial Infarction or myocardial injury due to an underlying chronic condition.         CBC Auto Differential [[id number]]  (Abnormal) Collected: 09/10/23 1339    Specimen: Blood from Arm, Right Updated: 09/10/23 1344     WBC 7.04 10*3/mm3      RBC 4.72 10*6/mm3      Hemoglobin 13.9 g/dL      Hematocrit 43.8 %      MCV 92.8 fL      MCH 29.4 pg      MCHC 31.7 g/dL      RDW 13.5 %      RDW-SD 46.0 fl      MPV 10.4 fL      Platelets 198 10*3/mm3      Neutrophil % 76.8 %      Lymphocyte % 14.3 %      Monocyte % 6.3 %      Eosinophil % 1.6 %      Basophil % 0.6 %      Immature Grans % 0.4 %      Neutrophils, Absolute 5.41 10*3/mm3      Lymphocytes, Absolute 1.01 10*3/mm3      Monocytes, Absolute 0.44 10*3/mm3      Eosinophils, Absolute 0.11 10*3/mm3      Basophils, Absolute 0.04 10*3/mm3      Immature Grans, Absolute 0.03 10*3/mm3      nRBC 0.0 /100 WBC     Lactic Acid, Plasma [[id number]]  (Normal) Collected: 09/10/23 1643    Specimen: Blood Updated: 09/10/23 1713     Lactate 1.0 mmol/L     High Sensitivity Troponin T [[id number]]  (Abnormal) Collected: 09/10/23 1643    Specimen: Blood Updated: 09/10/23 1715     HS Troponin T 12 ng/L     Narrative:      High Sensitive Troponin T Reference Range:  <10.0 ng/L- Negative Female for AMI  <15.0 ng/L- Negative Male for AMI  >=10 - Abnormal Female indicating possible myocardial injury.  >=15 - Abnormal Male  indicating possible myocardial injury.   Clinicians would have to utilize clinical acumen, EKG, Troponin, and serial changes to determine if it is an Acute Myocardial Infarction or myocardial injury due to an underlying chronic condition.         Influenza Antigen, Rapid - Swab, Nasopharynx [[id number]]  (Normal) Collected: 09/10/23 1644    Specimen: Swab from Nasopharynx Updated: 09/10/23 1716     Influenza A Ag, EIA Negative     Influenza B Ag, EIA Negative    COVID-19,CEPHEID/SAVANNAH,COR/MORENITA/PAD/JUAN/MAD IN-HOUSE(OR EMERGENT/ADD-ON),NP SWAB IN TRANSPORT MEDIA 3-4 HR TAT, RT-PCR - Swab, Nasopharynx [[id number]]  (Normal) Collected: 09/10/23 1644    Specimen: Swab from Nasopharynx Updated: 09/10/23 1734     COVID19 Not Detected    Narrative:      Fact sheet for providers: https://www.fda.gov/media/537446/download     Fact sheet for patients: https://www.fda.gov/media/186042/download  Fact sheet for providers: https://www.fda.gov/media/736434/download     Fact sheet for patients: https://www.fda.gov/media/130685/download             Imaging:    XR Chest 1 View    Result Date: 9/10/2023  PROCEDURE: XR CHEST 1 VW  COMPARISON: [facility], , CHEST AP/PA 1 VIEW, 5/31/2019, 20:24.  INDICATIONS: SOA Triage Protocol.  COUGH AND SHORTNESS OF BREATH WORSENING TODAY.  FINDINGS:  The heart is normal in size.  The lungs are well-expanded and free of infiltrates.  Bony structures appear intact.        No active disease is seen.       JULITO MAYA MD       Electronically Signed and Approved By: JULITO MAYA MD on 9/10/2023 at 14:17                Differential Diagnosis and Discussion:    Cough: Differential diagnosis includes but is not limited to pneumonia, acute bronchitis, upper respiratory infection, ACE inhibitor use, allergic reaction, epiglottitis, seasonal allergies, chemical irritants, exercise-induced asthma, viral syndrome.  Dyspnea: Differential diagnosis includes but is not limited to metabolic acidosis,  neurological disorders, psychogenic, asthma, pneumothorax, upper airway obstruction, COPD, pneumonia, noncardiogenic pulmonary edema, interstitial lung disease, anemia, congestive heart failure, and pulmonary embolism    All labs were reviewed and interpreted by me.  All X-rays impressions were independently interpreted by me.  EKG was interpreted by me.    MDM     Amount and/or Complexity of Data Reviewed  Clinical lab tests: reviewed  Tests in the radiology section of CPT®: reviewed  Tests in the medicine section of CPT®: reviewed           This patient is a pleasant 78-year-old female now presenting with cough and congestion and feeling generally under the weather for the past couple days.    Family member at the bedside is actually just getting over similar symptoms.    We are testing her for COVID-19 and flu.    All of her lab work and EKG and chest x-ray looked okay and no signs of pneumonia seen.    She is oxygenating 95% on room air and lung fields are clear and no increased respiratory effort seen.    We did give her initially a breathing treatment and some steroids here and I will give her some cough medicine.      COVID-19 and flu swabs are negative.  Chest x-ray normal.        Sounds like she could have viral respiratory infection or some bronchitis so we may consider even Z-Pawel antibiotics depending on what her test show.              Patient Care Considerations:          Consultants/Shared Management Plan:        Social Determinants of Health:    Patient has presented with family members who are responsible, reliable and will ensure follow up care.      Disposition and Care Coordination:    Discharged: The patient is suitable and stable for discharge with no need for consideration of observation or admission.    I have explained the patient´s condition, diagnoses and treatment plan based on the information available to me at this time. I have answered questions and addressed any concerns. The patient  has a good  understanding of the patient´s diagnosis, condition, and treatment plan as can be expected at this point. The vital signs have been stable. The patient´s condition is stable and appropriate for discharge from the emergency department.      The patient will pursue further outpatient evaluation with the primary care physician or other designated or consulting physician as outlined in the discharge instructions. They are agreeable to this plan of care and follow-up instructions have been explained in detail. The patient has received these instructions in written format and have expressed an understanding of the discharge instructions. The patient is aware that any significant change in condition or worsening of symptoms should prompt an immediate return to this or the closest emergency department or call to 911.  I have explained discharge medications and the need for follow up with the patient/caretakers. This was also printed in the discharge instructions. Patient was discharged with the following medications and follow up:      Medication List        New Prescriptions      azithromycin 250 MG tablet  Commonly known as: Zithromax Z-Pawel  Take 2 tablets by mouth on day 1, then 1 tablet daily on days 2-5     benzonatate 200 MG capsule  Commonly known as: TESSALON  Take 1 capsule by mouth 3 (Three) Times a Day As Needed for Cough.               Where to Get Your Medications        These medications were sent to [id number] - [place], [place] - [address] AT [facility] - [phone] [facility] [phone] FX  [address] [place] [place] [address]      Phone: [phone]   azithromycin 250 MG tablet  benzonatate 200 MG capsule      Jessica Shah, MAUREEN  [address] [facility] [place] [address] [phone]    Call in 1 day  As needed, If symptoms worsen, for a follow-up appointment       Final diagnoses:   Acute bronchitis, unspecified organism        ED Disposition        ED Disposition   Discharge    Condition   Stable    Comment   --               This medical record created using voice recognition software.             Neil Henry MD  09/10/23 9577

## 2023-09-13 ENCOUNTER — TELEPHONE (OUTPATIENT)
Dept: FAMILY MEDICINE CLINIC | Age: 78
End: 2023-09-13

## 2023-09-14 RX ORDER — OXYBUTYNIN CHLORIDE 5 MG/1
5 TABLET, EXTENDED RELEASE ORAL DAILY
Qty: 30 TABLET | Refills: 3 | Status: SHIPPED | OUTPATIENT
Start: 2023-09-14

## 2023-09-14 NOTE — TELEPHONE ENCOUNTER
Ill switch to the Ditropan but if they don't cover this one she will need to call insurance to find out which one they will

## 2023-09-17 LAB
QT INTERVAL: 386 MS
QTC INTERVAL: 402 MS

## 2023-11-10 ENCOUNTER — NURSE TRIAGE (OUTPATIENT)
Dept: CALL CENTER | Facility: HOSPITAL | Age: 78
End: 2023-11-10
Payer: MEDICARE

## 2023-11-10 NOTE — TELEPHONE ENCOUNTER
Abdominal Pain 11/10/2023 Caller concerned about stomach pain with black stool afterwards. States her PCP set her up for UGI at her previous address. This has started back up since she moved into the [place] area.      HUB transferred call. Caller states she has pmh of ulcers and was told by her previous PCP that she needs to have an endoscopy, but she moved before they could get it set up. She called GI in [facility] and was told she needed a referral from PCP. Was calling today to set up new patient appointment. Reviewed Protocol with patient that advises she go to ED for persistent abdominal pain due to her age. She declines to go to ED,. Just wants to set up an appointment. Warm transfer to Kassie at [facility]. For appointment.

## 2023-11-10 NOTE — TELEPHONE ENCOUNTER
"Reason for Disposition   [1] Pain lasts > 10 minutes AND [2] age > 50    Additional Information   Negative: SEVERE difficulty breathing (e.g., struggling for each breath, speaks in single words)   Negative: Shock suspected (e.g., cold/pale/clammy skin, too weak to stand, low BP, rapid pulse)   Negative: Difficult to awaken or acting confused (e.g., disoriented, slurred speech)   Negative: Passed out (i.e., lost consciousness, collapsed and was not responding)   Negative: Visible sweat on face or sweat dripping down face   Negative: Sounds like a life-threatening emergency to the triager   Negative: Followed an abdomen (stomach) injury   Negative: Chest pain   Negative: [1] Abdominal pain AND [2] pregnant < 20 weeks   Negative: [1] Abdominal pain AND [2] pregnant 20 or more weeks   Negative: [1] Abdominal pain AND [2] postpartum (from 0 to 6 weeks after delivery)   Negative: Abdomen bloating or swelling are main symptoms   Negative: [1] SEVERE pain (e.g., excruciating) AND [2] present > 1 hour    Answer Assessment - Initial Assessment Questions  1. LOCATION: \"Where does it hurt?\"       Center of stomach all the way across  2. RADIATION: \"Does the pain shoot anywhere else?\" (e.g., chest, back)      No radiation  3. ONSET: \"When did the pain begin?\" (e.g., minutes, hours or days ago)       4-5 months, has been on Prilosec  4. SUDDEN: \"Gradual or sudden onset?\"      Gradual onset sometimes it is sudden onset  5. PATTERN \"Does the pain come and go, or is it constant?\"     - If it comes and goes: \"How long does it last?\" \"Do you have pain now?\"      (Note: Comes and goes means the pain is intermittent. It goes away completely between bouts.)     - If constant: \"Is it getting better, staying the same, or getting worse?\"       (Note: Constant means the pain never goes away completely; most serious pain is constant and gets worse.)       Comes and goes, sometimes 2-3 days, still has it today, sometimes gets nauseated, not " "quite as bad as yesterday has not eaten today  6. SEVERITY: \"How bad is the pain?\"  (e.g., Scale 1-10; mild, moderate, or severe)     - MILD (1-3): Doesn't interfere with normal activities, abdomen soft and not tender to touch..      - MODERATE (4-7): Interferes with normal activities or awakens from sleep, abdomen tender to touch.      - SEVERE (8-10): Excruciating pain, doubled over, unable to do any normal activities.        6/10  7. RECURRENT SYMPTOM: \"Have you ever had this type of stomach pain before?\" If Yes, ask: \"When was the last time?\" and \"What happened that time?\"       Yes, has PMH of stomach ulcers and was recommended by her previous PCP to have Endoscopy but she moved before that could be set up  8. AGGRAVATING FACTORS: \"Does anything seem to cause this pain?\" (e.g., foods, stress, alcohol)      Foods, and stress  9. CARDIAC SYMPTOMS: \"Do you have any of the following symptoms: chest pain, difficulty breathing, sweating, nausea?\"      Nausea,   10. OTHER SYMPTOMS: \"Do you have any other symptoms?\" (e.g., back pain, diarrhea, fever, urination pain, vomiting)        nausea  11. PREGNANCY: \"Is there any chance you are pregnant?\" \"When was your last menstrual period?\"        no    Protocols used: Abdominal Pain - Upper-ADULT-AH    "

## 2023-11-27 NOTE — TELEPHONE ENCOUNTER
Last OV 8/29/2023  Next OV Visit date not found      Requested Prescriptions     Pending Prescriptions Disp Refills    oxybutynin (DITROPAN-XL) 5 MG extended release tablet [Pharmacy Med Name: OXYBUTYNIN ER 5MG TABLETS] 30 tablet 3     Sig: TAKE 1 TABLET BY MOUTH DAILY

## 2023-11-29 RX ORDER — OXYBUTYNIN CHLORIDE 5 MG/1
5 TABLET, EXTENDED RELEASE ORAL DAILY
Qty: 30 TABLET | Refills: 3 | Status: SHIPPED | OUTPATIENT
Start: 2023-11-29

## 2023-12-07 DIAGNOSIS — R10.13 EPIGASTRIC PAIN: ICD-10-CM

## 2023-12-07 NOTE — TELEPHONE ENCOUNTER
Last OV 8/29/2023  Next OV Visit date not found      Requested Prescriptions     Pending Prescriptions Disp Refills    sucralfate (CARAFATE) 1 GM tablet [Pharmacy Med Name: SUCRALFATE 1GM TABLETS] 120 tablet 5     Sig: TAKE 1 TABLET BY MOUTH FOUR TIMES DAILY

## 2023-12-10 RX ORDER — SUCRALFATE 1 G/1
1 TABLET ORAL 4 TIMES DAILY
Qty: 120 TABLET | Refills: 5 | Status: SHIPPED | OUTPATIENT
Start: 2023-12-10

## 2024-02-18 ENCOUNTER — APPOINTMENT (OUTPATIENT)
Dept: CT IMAGING | Facility: HOSPITAL | Age: 79
End: 2024-02-18
Payer: MEDICARE

## 2024-02-18 ENCOUNTER — APPOINTMENT (OUTPATIENT)
Dept: GENERAL RADIOLOGY | Facility: HOSPITAL | Age: 79
End: 2024-02-18
Payer: MEDICARE

## 2024-02-18 ENCOUNTER — HOSPITAL ENCOUNTER (EMERGENCY)
Facility: HOSPITAL | Age: 79
Discharge: HOME | End: 2024-02-18
Attending: EMERGENCY MEDICINE | Admitting: EMERGENCY MEDICINE
Payer: MEDICARE

## 2024-02-18 VITALS
WEIGHT: 132.5 LBS | HEIGHT: 61 IN | RESPIRATION RATE: 14 BRPM | SYSTOLIC BLOOD PRESSURE: 133 MMHG | OXYGEN SATURATION: 96 % | TEMPERATURE: 97.8 F | HEART RATE: 66 BPM | DIASTOLIC BLOOD PRESSURE: 50 MMHG | BODY MASS INDEX: 25.02 KG/M2

## 2024-02-18 DIAGNOSIS — I15.9 SECONDARY HYPERTENSION: Primary | ICD-10-CM

## 2024-02-18 LAB
ALBUMIN SERPL-MCNC: 4.1 G/DL (ref 3.5–5.2)
ALBUMIN/GLOB SERPL: 1.2 G/DL
ALP SERPL-CCNC: 149 U/L (ref 39–117)
ALT SERPL W P-5'-P-CCNC: 19 U/L (ref 1–33)
ANION GAP SERPL CALCULATED.3IONS-SCNC: 11.9 MMOL/L (ref 5–15)
AST SERPL-CCNC: 18 U/L (ref 1–32)
BACTERIA UR QL AUTO: NORMAL /HPF
BASOPHILS # BLD AUTO: 0.06 10*3/MM3 (ref 0–0.2)
BASOPHILS NFR BLD AUTO: 0.8 % (ref 0–1.5)
BILIRUB SERPL-MCNC: 0.7 MG/DL (ref 0–1.2)
BILIRUB UR QL STRIP: NEGATIVE
BUN SERPL-MCNC: 18 MG/DL (ref 8–23)
BUN/CREAT SERPL: 16.4 (ref 7–25)
CALCIUM SPEC-SCNC: 9.5 MG/DL (ref 8.6–10.5)
CHLORIDE SERPL-SCNC: 102 MMOL/L (ref 98–107)
CLARITY UR: CLEAR
CO2 SERPL-SCNC: 25.1 MMOL/L (ref 22–29)
COLOR UR: YELLOW
CREAT SERPL-MCNC: 1.1 MG/DL (ref 0.57–1)
DEPRECATED RDW RBC AUTO: 46 FL (ref 37–54)
EGFRCR SERPLBLD CKD-EPI 2021: 51.5 ML/MIN/1.73
EOSINOPHIL # BLD AUTO: 0.07 10*3/MM3 (ref 0–0.4)
EOSINOPHIL NFR BLD AUTO: 0.9 % (ref 0.3–6.2)
ERYTHROCYTE [DISTWIDTH] IN BLOOD BY AUTOMATED COUNT: 13.7 % (ref 12.3–15.4)
GLOBULIN UR ELPH-MCNC: 3.3 GM/DL
GLUCOSE BLDC GLUCOMTR-MCNC: 85 MG/DL (ref 70–99)
GLUCOSE SERPL-MCNC: 120 MG/DL (ref 65–99)
GLUCOSE UR STRIP-MCNC: NEGATIVE MG/DL
HCT VFR BLD AUTO: 44.3 % (ref 34–46.6)
HGB BLD-MCNC: 14.3 G/DL (ref 12–15.9)
HGB UR QL STRIP.AUTO: NEGATIVE
HOLD SPECIMEN: NORMAL
HOLD SPECIMEN: NORMAL
HYALINE CASTS UR QL AUTO: NORMAL /LPF
IMM GRANULOCYTES # BLD AUTO: 0.03 10*3/MM3 (ref 0–0.05)
IMM GRANULOCYTES NFR BLD AUTO: 0.4 % (ref 0–0.5)
KETONES UR QL STRIP: NEGATIVE
LEUKOCYTE ESTERASE UR QL STRIP.AUTO: ABNORMAL
LYMPHOCYTES # BLD AUTO: 0.91 10*3/MM3 (ref 0.7–3.1)
LYMPHOCYTES NFR BLD AUTO: 12.1 % (ref 19.6–45.3)
MAGNESIUM SERPL-MCNC: 2.1 MG/DL (ref 1.6–2.4)
MCH RBC QN AUTO: 29.3 PG (ref 26.6–33)
MCHC RBC AUTO-ENTMCNC: 32.3 G/DL (ref 31.5–35.7)
MCV RBC AUTO: 90.8 FL (ref 79–97)
MONOCYTES # BLD AUTO: 0.43 10*3/MM3 (ref 0.1–0.9)
MONOCYTES NFR BLD AUTO: 5.7 % (ref 5–12)
NEUTROPHILS NFR BLD AUTO: 6.01 10*3/MM3 (ref 1.7–7)
NEUTROPHILS NFR BLD AUTO: 80.1 % (ref 42.7–76)
NITRITE UR QL STRIP: NEGATIVE
NRBC BLD AUTO-RTO: 0 /100 WBC (ref 0–0.2)
PH UR STRIP.AUTO: 6.5 [PH] (ref 5–8)
PLATELET # BLD AUTO: 257 10*3/MM3 (ref 140–450)
PMV BLD AUTO: 10.8 FL (ref 6–12)
POTASSIUM SERPL-SCNC: 3.9 MMOL/L (ref 3.5–5.2)
PROT SERPL-MCNC: 7.4 G/DL (ref 6–8.5)
PROT UR QL STRIP: NEGATIVE
QT INTERVAL: 470 MS
QTC INTERVAL: 471 MS
RBC # BLD AUTO: 4.88 10*6/MM3 (ref 3.77–5.28)
RBC # UR STRIP: NORMAL /HPF
REF LAB TEST METHOD: NORMAL
SODIUM SERPL-SCNC: 139 MMOL/L (ref 136–145)
SP GR UR STRIP: 1.01 (ref 1–1.03)
SQUAMOUS #/AREA URNS HPF: NORMAL /HPF
TROPONIN T SERPL HS-MCNC: 12 NG/L
UROBILINOGEN UR QL STRIP: ABNORMAL
WBC # UR STRIP: NORMAL /HPF
WBC NRBC COR # BLD AUTO: 7.51 10*3/MM3 (ref 3.4–10.8)
WHOLE BLOOD HOLD COAG: NORMAL
WHOLE BLOOD HOLD SPECIMEN: NORMAL

## 2024-02-18 PROCEDURE — 80053 COMPREHEN METABOLIC PANEL: CPT

## 2024-02-18 PROCEDURE — 25010000002 HYDRALAZINE PER 20 MG: Performed by: EMERGENCY MEDICINE

## 2024-02-18 PROCEDURE — 36415 COLL VENOUS BLD VENIPUNCTURE: CPT

## 2024-02-18 PROCEDURE — 96374 THER/PROPH/DIAG INJ IV PUSH: CPT

## 2024-02-18 PROCEDURE — 84484 ASSAY OF TROPONIN QUANT: CPT

## 2024-02-18 PROCEDURE — 81001 URINALYSIS AUTO W/SCOPE: CPT | Performed by: EMERGENCY MEDICINE

## 2024-02-18 PROCEDURE — 85025 COMPLETE CBC W/AUTO DIFF WBC: CPT

## 2024-02-18 PROCEDURE — 99284 EMERGENCY DEPT VISIT MOD MDM: CPT

## 2024-02-18 PROCEDURE — 83735 ASSAY OF MAGNESIUM: CPT

## 2024-02-18 PROCEDURE — 93005 ELECTROCARDIOGRAM TRACING: CPT

## 2024-02-18 PROCEDURE — 82948 REAGENT STRIP/BLOOD GLUCOSE: CPT

## 2024-02-18 PROCEDURE — 93005 ELECTROCARDIOGRAM TRACING: CPT | Performed by: EMERGENCY MEDICINE

## 2024-02-18 PROCEDURE — 93010 ELECTROCARDIOGRAM REPORT: CPT | Performed by: SPECIALIST

## 2024-02-18 PROCEDURE — 71045 X-RAY EXAM CHEST 1 VIEW: CPT

## 2024-02-18 RX ORDER — SODIUM CHLORIDE 0.9 % (FLUSH) 0.9 %
10 SYRINGE (ML) INJECTION AS NEEDED
Status: DISCONTINUED | OUTPATIENT
Start: 2024-02-18 | End: 2024-02-18 | Stop reason: HOSPADM

## 2024-02-18 RX ORDER — HYDRALAZINE HYDROCHLORIDE 20 MG/ML
20 INJECTION INTRAMUSCULAR; INTRAVENOUS ONCE
Status: COMPLETED | OUTPATIENT
Start: 2024-02-18 | End: 2024-02-18

## 2024-02-18 RX ORDER — AMLODIPINE BESYLATE 5 MG/1
10 TABLET ORAL DAILY
Qty: 20 TABLET | Refills: 0 | OUTPATIENT
Start: 2024-02-18 | End: 2024-02-24

## 2024-02-18 RX ADMIN — HYDRALAZINE HYDROCHLORIDE 20 MG: 20 INJECTION, SOLUTION INTRAMUSCULAR; INTRAVENOUS at 17:47

## 2024-02-18 NOTE — ED PROVIDER NOTES
"Time: 2:53 PM EST  Date of encounter:  2/18/2024  Independent Historian/Clinical History and Information was obtained by:   Patient    History is limited by: N/A    Chief Complaint   Patient presents with    Hypertension         History of Present Illness:  Patient is a 78 y.o. year old female who presents to the emergency department accompanied by her son for evaluation of high blood pressure intermittently for the past week as well as headache and dizziness.  Denies chest pain or shortness of breath.  Has been taking BP meds as prescribed.  MAUREEN Herndon    The patient presents to the ER with a chief complaint of elevated blood pressure, with a reported reading of 230/110. The patient has a known history of hypertension, typically with readings around the 180s/115 range, but the current reading is notably higher.    The patient denies experiencing chest pain or shortness of breath. However, they report symptoms of dizziness and a sensation of heat at the top of the head coinciding with the spikes in blood pressure. The patient's blood pressure has been consistently high over the past week, with occasional readings in the 120s/60s, but predominantly in the 150s-170s systolic and 70s-80s diastolic range.    No recent dietary changes have been reported by the patient, and they deny any burning sensation during urination or pain. The patient has a history of migraines, although they have not had a headache since the 1990s. They describe a feeling of being \"weird,\" along with dizziness and eye discomfort during episodes of high blood pressure. The patient has undergone an echocardiogram and heart catheterization in the past.      Patient Care Team  Primary Care Provider: Jessica Shah APRN    Past Medical History:     Allergies   Allergen Reactions    Adhesive Tape Rash     Past Medical History:   Diagnosis Date    Bradycardia     Depression     Hyperlipidemia     Hypertension      Past Surgical History: "   Procedure Laterality Date    APPENDECTOMY      BREAST AUGMENTATION      CARDIAC CATHETERIZATION      HYSTERECTOMY      MASTECTOMY      TONSILLECTOMY       History reviewed. No pertinent family history.    Home Medications:  Prior to Admission medications    Medication Sig Start Date End Date Taking? Authorizing Provider   amLODIPine (NORVASC) 5 MG tablet Take 1 tablet by mouth Daily.  Patient not taking: Reported on 9/10/2023 11/14/22   Tessa Raymond MD   azithromycin (Zithromax Z-Pawel) 250 MG tablet Take 2 tablets by mouth on day 1, then 1 tablet daily on days 2-5 9/10/23   Neil Henry MD   benzonatate (TESSALON) 200 MG capsule Take 1 capsule by mouth 3 (Three) Times a Day As Needed for Cough. 9/10/23   Neil Henry MD   escitalopram (LEXAPRO) 20 MG tablet Take 1 tablet by mouth Daily.  Patient not taking: Reported on 9/10/2023 11/7/22   Tessa Raymond MD   HYDROcodone-acetaminophen (NORCO) 5-325 MG per tablet Take 1 tablet by mouth Every 4 (Four) Hours As Needed for moderate pain (4-6) for up to 12 doses.  Patient not taking: Reported on 9/10/2023 10/14/16   Charli French MD   omeprazole OTC (PriLOSEC OTC) 20 MG EC tablet Take 1 tablet by mouth.  Patient not taking: Reported on 9/10/2023    Tessa Raymond MD   ondansetron ODT (ZOFRAN-ODT) 4 MG disintegrating tablet Take 1 tablet by mouth 4 (Four) Times a Day As Needed for nausea or vomiting.  Patient not taking: Reported on 8/16/2023 10/14/16   Charli French MD   pravastatin (PRAVACHOL) 20 MG tablet Take 1 tablet by mouth Daily.  Patient not taking: Reported on 9/10/2023    Tessa Raymond MD   sucralfate (CARAFATE) 1 g tablet Take 1 tablet by mouth 4 (Four) Times a Day. 11/3/22   Tessa Raymond MD   tiZANidine (ZANAFLEX) 4 MG tablet Take 1 tablet by mouth At Night As Needed for Muscle Spasms.    Tessa Raymond MD   tolterodine LA (DETROL LA) 2 MG 24 hr capsule Take 1 capsule by mouth Daily.  Patient  "not taking: Reported on 9/10/2023 9/27/22   ProviderTessa MD   valsartan (DIOVAN) 80 MG tablet Take 1 tablet by mouth Daily. 11/21/22   Provider, MD Tessa        Social History:   Social History     Tobacco Use    Smoking status: Never     Passive exposure: Never    Smokeless tobacco: Never   Vaping Use    Vaping Use: Never used   Substance Use Topics    Alcohol use: Yes     Comment: Occassionally, once a month    Drug use: No         Review of Systems:  Review of Systems   Constitutional:  Negative for chills and fever.   HENT:  Negative for congestion, rhinorrhea and sore throat.    Eyes:  Negative for pain and visual disturbance.   Respiratory:  Negative for apnea, cough, chest tightness and shortness of breath.    Cardiovascular:  Negative for chest pain and palpitations.   Gastrointestinal:  Negative for abdominal pain, diarrhea, nausea and vomiting.   Genitourinary:  Negative for difficulty urinating and dysuria.   Musculoskeletal:  Negative for joint swelling and myalgias.   Skin:  Negative for color change.   Neurological:  Positive for dizziness and headaches. Negative for seizures.   Psychiatric/Behavioral: Negative.     All other systems reviewed and are negative.       Physical Exam:  /50   Pulse 66   Temp 97.8 °F (36.6 °C)   Resp 14   Ht 154.9 cm (61\")   Wt 60.1 kg (132 lb 7.9 oz)   SpO2 96%   BMI 25.03 kg/m²         Physical Exam  Vitals and nursing note reviewed.   Constitutional:       General: She is not in acute distress.     Appearance: Normal appearance. She is not toxic-appearing.   HENT:      Head: Normocephalic and atraumatic.      Jaw: There is normal jaw occlusion.      Mouth/Throat:      Mouth: Mucous membranes are moist.   Eyes:      General: Lids are normal.      Extraocular Movements: Extraocular movements intact.      Conjunctiva/sclera: Conjunctivae normal.      Pupils: Pupils are equal, round, and reactive to light.   Cardiovascular:      Rate and Rhythm: " Normal rate and regular rhythm.      Pulses: Normal pulses.      Heart sounds: Normal heart sounds.   Pulmonary:      Effort: Pulmonary effort is normal. No respiratory distress.      Breath sounds: Normal breath sounds. No wheezing or rhonchi.   Abdominal:      General: Abdomen is flat. There is no distension.      Palpations: Abdomen is soft.      Tenderness: There is no abdominal tenderness. There is no guarding or rebound.   Musculoskeletal:         General: Normal range of motion.      Cervical back: Normal range of motion and neck supple.      Right lower leg: No edema.      Left lower leg: No edema.   Skin:     General: Skin is warm and dry.   Neurological:      General: No focal deficit present.      Mental Status: She is alert and oriented to person, place, and time. Mental status is at baseline.   Psychiatric:         Mood and Affect: Mood normal.         Behavior: Behavior normal.                    Procedures:  Procedures      Medical Decision Making:      Comorbidities that affect care:    Hypertension    External Notes reviewed:    Previous Clinic Note: Patient was last seen in urgent care for sinusitis.      The following orders were placed and all results were independently analyzed by me:  Orders Placed This Encounter   Procedures    XR Chest 1 View    [place] Draw    Comprehensive Metabolic Panel    Single High Sensitivity Troponin T    Magnesium    Urinalysis With Microscopic If Indicated (No Culture) - Urine, Clean Catch    CBC Auto Differential    Urinalysis, Microscopic Only - Urine, Clean Catch    NPO Diet NPO Type: Strict NPO    Undress & Gown    Continuous Pulse Oximetry    Vital Signs    Orthostatic Blood Pressure    Oxygen Therapy- Nasal Cannula; Titrate 1-6 LPM Per SpO2; 90 - 95%    POC Glucose Once    ECG 12 Lead ED Triage Standing Order; Weak / Dizzy / AMS    ECG 12 Lead Chest Pain    Insert Peripheral IV    Fall Precautions    CBC & Differential    Green Top (Gel)    Lavender Top     Gold Top - SST    Light Blue Top       Medications Given in the Emergency Department:  Medications   sodium chloride 0.9 % flush 10 mL (has no administration in time range)   hydrALAZINE (APRESOLINE) injection 20 mg (20 mg Intravenous Given 2/18/24 1747)        ED Course:    The patient was initially evaluated in the triage area where orders were placed. The patient was later dispositioned by Tabitha Vega MD.      The patient was advised to stay for completion of workup which includes but is not limited to communication of labs and radiological results, reassessment and plan. The patient was advised that leaving prior to disposition by a provider could result in critical findings that are not communicated to the patient.          Labs:    Lab Results (last 24 hours)       Procedure Component Value Units Date/Time    CBC & Differential [[id number]]  (Abnormal) Collected: 02/18/24 1355    Specimen: Blood from Arm, Right Updated: 02/18/24 1404    Narrative:      The following orders were created for panel order CBC & Differential.  Procedure                               Abnormality         Status                     ---------                               -----------         ------                     CBC Auto Differential[[id number]]        Abnormal            Final result                 Please view results for these tests on the individual orders.    Comprehensive Metabolic Panel [[id number]]  (Abnormal) Collected: 02/18/24 1355    Specimen: Blood from Arm, Right Updated: 02/18/24 1425     Glucose 120 mg/dL      BUN 18 mg/dL      Creatinine 1.10 mg/dL      Sodium 139 mmol/L      Potassium 3.9 mmol/L      Chloride 102 mmol/L      CO2 25.1 mmol/L      Calcium 9.5 mg/dL      Total Protein 7.4 g/dL      Albumin 4.1 g/dL      ALT (SGPT) 19 U/L      AST (SGOT) 18 U/L      Alkaline Phosphatase 149 U/L      Total Bilirubin 0.7 mg/dL      Globulin 3.3 gm/dL      A/G Ratio 1.2 g/dL      BUN/Creatinine Ratio 16.4     Anion  Gap 11.9 mmol/L      eGFR 51.5 mL/min/1.73     Narrative:      GFR Normal >60  Chronic Kidney Disease <60  Kidney Failure <15    The GFR formula is only valid for adults with stable renal function between ages 18 and 70.    Single High Sensitivity Troponin T [[id number]]  (Normal) Collected: 02/18/24 1355    Specimen: Blood from Arm, Right Updated: 02/18/24 1425     HS Troponin T 12 ng/L     Narrative:      High Sensitive Troponin T Reference Range:  <14.0 ng/L- Negative Female for AMI  <22.0 ng/L- Negative Male for AMI  >=14 - Abnormal Female indicating possible myocardial injury.  >=22 - Abnormal Male indicating possible myocardial injury.   Clinicians would have to utilize clinical acumen, EKG, Troponin, and serial changes to determine if it is an Acute Myocardial Infarction or myocardial injury due to an underlying chronic condition.         Magnesium [[id number]]  (Normal) Collected: 02/18/24 1355    Specimen: Blood from Arm, Right Updated: 02/18/24 1425     Magnesium 2.1 mg/dL     CBC Auto Differential [[id number]]  (Abnormal) Collected: 02/18/24 1355    Specimen: Blood from Arm, Right Updated: 02/18/24 1404     WBC 7.51 10*3/mm3      RBC 4.88 10*6/mm3      Hemoglobin 14.3 g/dL      Hematocrit 44.3 %      MCV 90.8 fL      MCH 29.3 pg      MCHC 32.3 g/dL      RDW 13.7 %      RDW-SD 46.0 fl      MPV 10.8 fL      Platelets 257 10*3/mm3      Neutrophil % 80.1 %      Lymphocyte % 12.1 %      Monocyte % 5.7 %      Eosinophil % 0.9 %      Basophil % 0.8 %      Immature Grans % 0.4 %      Neutrophils, Absolute 6.01 10*3/mm3      Lymphocytes, Absolute 0.91 10*3/mm3      Monocytes, Absolute 0.43 10*3/mm3      Eosinophils, Absolute 0.07 10*3/mm3      Basophils, Absolute 0.06 10*3/mm3      Immature Grans, Absolute 0.03 10*3/mm3      nRBC 0.0 /100 WBC     POC Glucose Once [[id number]]  (Normal) Collected: 02/18/24 1445    Specimen: Blood Updated: 02/18/24 1446     Glucose 85 mg/dL      Comment: Serial Number:  [id number]:  [id number]       Urinalysis With Microscopic If Indicated (No Culture) - Urine, Clean Catch [[id number]]  (Abnormal) Collected: 02/18/24 1619    Specimen: Urine, Clean Catch Updated: 02/18/24 1635     Color, UA Yellow     Appearance, UA Clear     pH, UA 6.5     Specific Gravity, UA 1.010     Glucose, UA Negative     Ketones, UA Negative     Bilirubin, UA Negative     Blood, UA Negative     Protein, UA Negative     Leuk Esterase, UA Trace     Nitrite, UA Negative     Urobilinogen, UA 1.0 E.U./dL    Urinalysis, Microscopic Only - Urine, Clean Catch [[id number]] Collected: 02/18/24 1619    Specimen: Urine, Clean Catch Updated: 02/18/24 1635     RBC, UA 0-2 /HPF      WBC, UA 0-2 /HPF      Bacteria, UA None Seen /HPF      Squamous Epithelial Cells, UA 0-2 /HPF      Hyaline Casts, UA None Seen /LPF      Methodology Automated Microscopy             Imaging:    XR Chest 1 View    Result Date: 2/18/2024  PROCEDURE: XR CHEST 1 VW  COMPARISON: [facility], CR, CHEST AP/PA 1 VIEW, 2/24/2013, 20:56.  [facility], CR, XR CHEST 1 VW, 9/10/2023, 13:45.  INDICATIONS: Weak/Dizzy/AMS triage protocol/ pt stated she had high bp  FINDINGS:   The lungs are well-expanded. The heart and pulmonary vasculature are within normal limits. No pleural effusions are identified. There are no active appearing infiltrates.  Prior bilateral mastectomy.  IMPRESSION: No active disease.  PAUL SHARMA MD       Electronically Signed and Approved By: PAUL SHARMA MD on 2/18/2024 at 15:27                Differential Diagnosis and Discussion:      Metabolic: Differential diagnosis includes but is not limited to hypertension, hyperglycemia, hyperkalemia, hypocalcemia, metabolic acidosis, hypokalemia, hypoglycemia, malnutrition, hypothyroidism, hyperthyroidism, and adrenal insufficiency.     All labs were reviewed and interpreted by me.  EKG was interpreted by me.    MDM     Amount and/or Complexity of Data  Reviewed  Clinical lab tests: reviewed  Tests in the radiology section of CPT®: reviewed  Tests in the medicine section of CPT®: reviewed       The patient presents to the ED with hypertension.  The patient´s CBC that was reviewed and interpreted by me shows no abnormalities of critical concern. Of note, there is no anemia requiring a blood transfusion and the platelet count is acceptable.  The patient´s CMP that was reviewed and interpretted by me shows no abnormalities of critical concern. Of note, the patient´s sodium and potassium are acceptable. The patient´s liver enzymes are unremarkable. The patient´s renal function (creatinine) is preserved. The patient has a normal anion gap.  Troponin is negative.  Urinalysis is negative for bacteriuria.  Patient was given hydralazine IV in the emergency department.  The patient was placed on a monitor in the ED. The blood pressure is much improved with ED treatment. The patient denies chest pain. The patient has a normal neurological exam and has mental status at baseline. Upon re-examination, the patient has no signs or symptoms of acute end organ damage.  The patient was advised of the importance of medical compliance with an emphasis in awareness of their daily sodium intake. The patient was counseled that elevated blood pressure is detrimental to their heart, eyes, kidneys, and may lead to a stroke. The patient was advised to check their blood pressure regularly and follow up as an outpatient regarding this matter. The patient was counseled to return to the ED for sudden or severe headache, numbness or tingling on one side of the body, facial droop, difficulty speaking, chest pain, or shortness of breath.  Patient is requesting discharge.  The patient's condition is stable and appropriate for discharge. The patient will pursue further outpatient evaluation with the primary care physician or other designated or consulting physician as indicated in the discharge  instructions.    Patient was placed on the cardiac monitor after given hydralazine.  They were monitored for ventricular ectopy, arrhythmia, tachycardia, hypoxia, and changes in blood pressure.  Patient was rechecked several times throughout their stay.      Critical Care Note: Total Critical Care time of 45 minutes. Total critical care time documented does not include time spent on separately billed procedures for services of nurses or physician assistants. I personally saw and examined the patient. I have reviewed all diagnostic interpretations and treatment plans as written. I was present for the key portions of any procedures performed and the inclusive time noted in any critical care statement. Critical care time includes patient management by me, time spent at the patients bedside,  time to review lab and imaging results, discussing patient care, documentation in the medical record, and time spent with family or caregiver.        Patient Care Considerations:    None      Consultants/Shared Management Plan:    None    Social Determinants of Health:    Patient is independent, reliable, and has access to care.       Disposition and Care Coordination:    Discharged: I considered escalation of care by admitting this patient to the hospital, however the patient has improved and is suitable and  stable for discharge.    I have explained the patient´s condition, diagnoses and treatment plan based on the information available to me at this time. I have answered questions and addressed any concerns. The patient has a good  understanding of the patient´s diagnosis, condition, and treatment plan as can be expected at this point. The vital signs have been stable. The patient´s condition is stable and appropriate for discharge from the emergency department.      The patient will pursue further outpatient evaluation with the primary care physician or other designated or consulting physician as outlined in the discharge  instructions. They are agreeable to this plan of care and follow-up instructions have been explained in detail. The patient has received these instructions in written format and has expressed an understanding of the discharge instructions. The patient is aware that any significant change in condition or worsening of symptoms should prompt an immediate return to this or the closest emergency department or call to 911.  I have explained discharge medications and the need for follow up with the patient/caretakers. This was also printed in the discharge instructions. Patient was discharged with the following medications and follow up:      Medication List        Changed      amLODIPine 5 MG tablet  Commonly known as: NORVASC  Take 2 tablets by mouth Daily.  What changed: how much to take               Where to Get Your Medications        These medications were sent to [facility]/pharmacy #[id number] - [place], [place] - [address] - [phone] [facility] [phone] FX  [address] [place] [address]      Hours: 24-hours Phone: [phone]   amLODIPine 5 MG tablet      Jessica Shah, APRN  [address] [facility] [place] [address] [phone]    In 2 days         Final diagnoses:   Secondary hypertension        ED Disposition       ED Disposition   Discharge    Condition   Stable    Comment   --               This medical record created using voice recognition software.             Tabitha Vega MD  02/18/24 3373

## 2024-02-18 NOTE — ED PROVIDER NOTES
"The patient presents to the ER with a chief complaint of elevated blood pressure, with a reported reading of 230/110. The patient has a known history of hypertension, typically with readings around the 180s/115 range, but the current reading is notably higher.    The patient denies experiencing chest pain or shortness of breath. However, they report symptoms of dizziness and a sensation of heat at the top of the head coinciding with the spikes in blood pressure. The patient's blood pressure has been consistently high over the past week, with occasional readings in the 120s/60s, but predominantly in the 150s-170s systolic and 70s-80s diastolic range.    No recent dietary changes have been reported by the patient, and they deny any burning sensation during urination or pain. The patient has a history of migraines, although they have not had a headache since the 1990s. They describe a feeling of being \"weird,\" along with dizziness and eye discomfort during episodes of high blood pressure. The patient has undergone an echocardiogram and heart catheterization in the past. They decline a CAT scan of the head, asserting that their head is fine."

## 2024-02-19 LAB
QT INTERVAL: 392 MS
QTC INTERVAL: 478 MS

## 2024-02-24 ENCOUNTER — HOSPITAL ENCOUNTER (EMERGENCY)
Facility: HOSPITAL | Age: 79
Discharge: HOME | End: 2024-02-24
Attending: EMERGENCY MEDICINE
Payer: MEDICARE

## 2024-02-24 VITALS
WEIGHT: 152.78 LBS | DIASTOLIC BLOOD PRESSURE: 71 MMHG | HEART RATE: 72 BPM | TEMPERATURE: 99.1 F | OXYGEN SATURATION: 95 % | HEIGHT: 61 IN | BODY MASS INDEX: 28.84 KG/M2 | SYSTOLIC BLOOD PRESSURE: 172 MMHG | RESPIRATION RATE: 18 BRPM

## 2024-02-24 DIAGNOSIS — I10 UNCONTROLLED HYPERTENSION: Primary | ICD-10-CM

## 2024-02-24 PROCEDURE — 99282 EMERGENCY DEPT VISIT SF MDM: CPT

## 2024-02-24 RX ORDER — AMLODIPINE BESYLATE 5 MG/1
5 TABLET ORAL DAILY
Qty: 30 TABLET | Refills: 1 | Status: SHIPPED | OUTPATIENT
Start: 2024-02-24

## 2024-02-24 NOTE — ED NOTES
Patient reports last Sunday having /89. Patient reports BP has been high x 3 weeks. REPORTS headache, pressure behind the eyes and dizziness when this occurs. Denies any pain at this time.

## 2024-02-25 NOTE — ED PROVIDER NOTES
Time: 7:33 PM EST  Date of encounter:  2/24/2024  Independent Historian/Clinical History and Information was obtained by:   Patient  Chief Complaint: Hypertension    History is limited by: N/A    History of Present Illness:  Patient is a 78 y.o. year old female who presents to the emergency department for evaluation of ***    HPI    Patient Care Team  Primary Care Provider: Jessica Shah APRN    Past Medical History:     Allergies   Allergen Reactions    Adhesive Tape Rash     Past Medical History:   Diagnosis Date    Bradycardia     Depression     Hyperlipidemia     Hypertension      Past Surgical History:   Procedure Laterality Date    APPENDECTOMY      BREAST AUGMENTATION      CARDIAC CATHETERIZATION      HYSTERECTOMY      MASTECTOMY      TONSILLECTOMY       History reviewed. No pertinent family history.    Home Medications:  Prior to Admission medications    Medication Sig Start Date End Date Taking? Authorizing Provider   amLODIPine (NORVASC) 5 MG tablet Take 2 tablets by mouth Daily. 2/18/24   Tabitha Vega MD   azithromycin (Zithromax Z-Pawel) 250 MG tablet Take 2 tablets by mouth on day 1, then 1 tablet daily on days 2-5 9/10/23   Neil Henry MD   benzonatate (TESSALON) 200 MG capsule Take 1 capsule by mouth 3 (Three) Times a Day As Needed for Cough. 9/10/23   Neil Henry MD   escitalopram (LEXAPRO) 20 MG tablet Take 1 tablet by mouth Daily.  Patient not taking: Reported on 9/10/2023 11/7/22   Tessa Raymond MD   HYDROcodone-acetaminophen (NORCO) 5-325 MG per tablet Take 1 tablet by mouth Every 4 (Four) Hours As Needed for moderate pain (4-6) for up to 12 doses.  Patient not taking: Reported on 9/10/2023 10/14/16   Charli French MD   omeprazole OTC (PriLOSEC OTC) 20 MG EC tablet Take 1 tablet by mouth.  Patient not taking: Reported on 9/10/2023    Tessa Raymond MD   ondansetron ODT (ZOFRAN-ODT) 4 MG disintegrating tablet Take 1 tablet by mouth 4 (Four) Times a Day As  "Needed for nausea or vomiting.  Patient not taking: Reported on 8/16/2023 10/14/16   Charli French MD   pravastatin (PRAVACHOL) 20 MG tablet Take 1 tablet by mouth Daily.  Patient not taking: Reported on 9/10/2023    Tessa Raymond MD   sucralfate (CARAFATE) 1 g tablet Take 1 tablet by mouth 4 (Four) Times a Day. 11/3/22   Tessa Raymond MD   tiZANidine (ZANAFLEX) 4 MG tablet Take 1 tablet by mouth At Night As Needed for Muscle Spasms.    Tessa Raymond MD   tolterodine LA (DETROL LA) 2 MG 24 hr capsule Take 1 capsule by mouth Daily.  Patient not taking: Reported on 9/10/2023 9/27/22   Tessa Raymond MD   valsartan (DIOVAN) 80 MG tablet Take 1 tablet by mouth Daily. 11/21/22   Tessa Raymond MD        Social History:   Social History     Tobacco Use    Smoking status: Never     Passive exposure: Never    Smokeless tobacco: Never   Vaping Use    Vaping Use: Never used   Substance Use Topics    Alcohol use: Yes     Comment: Occassionally, once a month    Drug use: No         Review of Systems:  Review of Systems     ***    Physical Exam:  /71   Pulse 72   Temp 99.1 °F (37.3 °C) (Oral)   Resp 18   Ht 154.9 cm (60.98\")   Wt 69.3 kg (152 lb 12.5 oz)   SpO2 95%   BMI 28.88 kg/m²     Physical Exam    Vital signs were reviewed under triage note.  General appearance - Patient appears well-developed and well-nourished.  Patient is in no acute distress.  Head - Normocephalic, atraumatic.  Pupils - Equal, round, reactive to light.  Extraocular muscles are intact.  Conjunctiva is clear.  Nasal - Normal inspection.  No evidence of trauma or epistaxis.  Tympanic membranes - Gray, intact without erythema or retractions.  Oral mucosa - Pink and moist without lesions or erythema.  Uvula is midline.  Chest wall - Atraumatic.  Chest wall is nontender.  There are no vesicular rashes noted.  Neck - Supple.  Trachea was midline.  There is no palpable lymphadenopathy or thyromegaly.  " There are no meningeal signs  Lungs - Clear to auscultation and percussion bilaterally.  Heart - Regular rate and rhythm without any murmurs, clicks, or gallops.  Abdomen - Soft.  Bowel sounds are present.  There is no palpable tenderness.  There is no rebound, guarding, or rigidity.  There are no palpable masses.  There are no pulsatile masses.  Back - Spine is straight and midline.  There is no CVA tenderness.  Extremities - Intact x4 with full range of motion.  There is no palpable edema.  Pulses are intact x4 and equal.  Neurologic - Patient is awake, alert, and oriented x3.  Cranial nerves II through XII are grossly intact.  Motor and sensory functions grossly intact.  Cerebellar function was normal.  Integument - There are no rashes.  There are no petechia or purpura lesions noted.  There are no vesicular lesions noted.           ***    Procedures:  Procedures      Medical Decision Making:      Comorbidities that affect care:    {Comorbidities that affect care:31144}    External Notes reviewed:    {External Note review (Optional):39881}      The following orders were placed and all results were independently analyzed by me:  No orders of the defined types were placed in this encounter.      Medications Given in the Emergency Department:  Medications - No data to display     ED Course:         ***    Labs:    Lab Results (last 24 hours)       ** No results found for the last 24 hours. **             Imaging:    No Radiology Exams Resulted Within Past 24 Hours      Differential Diagnosis and Discussion:    {Differentials:29447}    {Independent Review of (Optional):60749}    [facility]       {Critical Care:63178}    Patient Care Considerations:    {Considerations (Optional):92283}      Consultants/Shared Management Plan:    {Shared Management Plan (Optional):75595}    Social Determinants of Health:    {Social Determinants of Health (Optional):95990}      Disposition and Care Coordination:    {Admission  consideration:90115}    {Discharge (Optional):08857}    Final diagnoses:   Uncontrolled hypertension        ED Disposition       ED Disposition   Discharge    Condition   Stable    Comment   --               This medical record created using voice recognition software.           I do not see need to repeat these.      Consultants/Shared Management Plan:    None    Social Determinants of Health:    Patient is independent, reliable, and has access to care.       Disposition and Care Coordination:    Discharged: I considered escalation of care by admitting this patient to the hospital, however the patient has improved and is suitable and  stable for discharge.    I have explained the patient´s condition, diagnoses and treatment plan based on the information available to me at this time. I have answered questions and addressed any concerns. The patient has a good  understanding of the patient´s diagnosis, condition, and treatment plan as can be expected at this point. The vital signs have been stable. The patient´s condition is stable and appropriate for discharge from the emergency department.      The patient will pursue further outpatient evaluation with the primary care physician or other designated or consulting physician as outlined in the discharge instructions. They are agreeable to this plan of care and follow-up instructions have been explained in detail. The patient has received these instructions in written format and has expressed an understanding of the discharge instructions. The patient is aware that any significant change in condition or worsening of symptoms should prompt an immediate return to this or the closest emergency department or call to 911.  I have explained discharge medications and the need for follow up with the patient/caretakers. This was also printed in the discharge instructions. Patient was discharged with the following medications and follow up:      Medication List        Changed      amLODIPine 5 MG tablet  Commonly known as: NORVASC  Take 1 tablet by mouth Daily.  What changed: how much to take               Where to Get Your Medications        These medications were sent to [id number] - [place], [place] - [address] [place]  [facility] - [phone]  - [phone] FX  [address] [place] [address]      Phone: [phone]   amLODIPine 5 MG tablet      Jessica Shah, APRN  [address] [facility]  [place] [address] [phone]    In 1 week      ELBA Varghese MD  [address] [place] [address] [address] [place] [address] [phone]    Schedule an appointment as soon as possible for a visit         Final diagnoses:   Uncontrolled hypertension        ED Disposition       ED Disposition   Discharge    Condition   Stable    Comment   --               This medical record created using voice recognition software.             Cristi Paredes DO  02/27/24 [id number]

## 2024-02-25 NOTE — DISCHARGE INSTRUCTIONS
Continue your valsartan prescription as directed.  Add amlodipine to your daily regimen.  Take as directed.  Follow-up with primary care provider and/or cardiologist.  Call and schedule follow-up appointment.  Return to the ER for chest pain, shortness of breath, blood pressure persistently staying above 200/100, or any other concerns issues that may arise.

## 2024-03-10 LAB
QT INTERVAL: 470 MS
QTC INTERVAL: 471 MS

## 2024-03-21 ENCOUNTER — OFFICE VISIT (OUTPATIENT)
Dept: INTERNAL MEDICINE | Age: 79
End: 2024-03-21
Payer: MEDICARE

## 2024-03-21 VITALS
SYSTOLIC BLOOD PRESSURE: 140 MMHG | BODY MASS INDEX: 27.45 KG/M2 | HEIGHT: 61 IN | WEIGHT: 145.4 LBS | TEMPERATURE: 98 F | HEART RATE: 65 BPM | DIASTOLIC BLOOD PRESSURE: 80 MMHG | OXYGEN SATURATION: 98 %

## 2024-03-21 DIAGNOSIS — R53.83 OTHER FATIGUE: ICD-10-CM

## 2024-03-21 DIAGNOSIS — K25.7: ICD-10-CM

## 2024-03-21 DIAGNOSIS — R79.9 ABNORMAL FINDING OF BLOOD CHEMISTRY, UNSPECIFIED: ICD-10-CM

## 2024-03-21 DIAGNOSIS — Z00.00 WELL ADULT HEALTH CHECK: ICD-10-CM

## 2024-03-21 DIAGNOSIS — I10 ESSENTIAL HYPERTENSION: Primary | ICD-10-CM

## 2024-03-21 DIAGNOSIS — E78.2 MIXED HYPERLIPIDEMIA: ICD-10-CM

## 2024-03-21 PROBLEM — K21.9 ESOPHAGEAL REFLUX: Status: ACTIVE | Noted: 2024-03-21

## 2024-03-21 PROBLEM — R00.1 SYMPTOMATIC BRADYCARDIA: Status: ACTIVE | Noted: 2021-05-06

## 2024-03-21 PROBLEM — N39.3 FEMALE STRESS INCONTINENCE: Status: ACTIVE | Noted: 2017-11-09

## 2024-03-21 RX ORDER — PRAVASTATIN SODIUM 20 MG
20 TABLET ORAL DAILY
Qty: 90 TABLET | Refills: 1 | Status: SHIPPED | OUTPATIENT
Start: 2024-03-21

## 2024-03-21 RX ORDER — SUCRALFATE 1 G/1
1 TABLET ORAL 2 TIMES DAILY
Qty: 180 TABLET | Refills: 1 | Status: SHIPPED | COMMUNITY
Start: 2024-03-21

## 2024-03-21 RX ORDER — VALSARTAN 80 MG/1
80 TABLET ORAL 2 TIMES DAILY
Qty: 180 TABLET | Refills: 1 | Status: SHIPPED | COMMUNITY
Start: 2024-03-21

## 2024-03-21 RX ORDER — TIZANIDINE 4 MG/1
4 TABLET ORAL NIGHTLY PRN
Qty: 90 TABLET | Refills: 1 | Status: SHIPPED | OUTPATIENT
Start: 2024-03-21

## 2024-03-21 RX ORDER — AMLODIPINE BESYLATE 5 MG/1
5 TABLET ORAL DAILY PRN
Qty: 30 TABLET | Refills: 1 | Status: SHIPPED | OUTPATIENT
Start: 2024-03-21

## 2024-03-21 RX ORDER — OMEPRAZOLE 20 MG/1
20 CAPSULE, DELAYED RELEASE ORAL DAILY
COMMUNITY

## 2024-03-21 NOTE — ASSESSMENT & PLAN NOTE
Patient had a flare of this recently as of her 3/24 OV.  She has been using Carafate a couple of times a day and feels like it is calming things down.  No blood in her stool etc.  She had a CBC in the ER just last month, her hemoglobin was well normal at 14+.  She is going to finish up the Carafate she has on hand and then get back on over-the-counter PPI, certainly sounds appropriate.

## 2024-03-21 NOTE — PROGRESS NOTES
"Chief Complaint  Getting Established (Pt was seeing NP in [facility] at [facility], she has moved back. She states that she is having elevated BP. )    Subjective      Bharti Nobles presents to [facility]    History of Present Illness      Review of Systems   Constitutional:  Negative for appetite change, fatigue and fever.   HENT:  Negative for congestion and ear pain.    Eyes:  Negative for blurred vision.   Respiratory:  Negative for cough, chest tightness, shortness of breath and wheezing.    Cardiovascular:  Negative for chest pain, palpitations and leg swelling.   Gastrointestinal:  Negative for abdominal pain.   Genitourinary:  Negative for difficulty urinating, dysuria and hematuria.   Musculoskeletal:  Negative for arthralgias and gait problem.   Skin:  Negative for skin lesions.   Neurological:  Negative for syncope, memory problem and confusion.   Psychiatric/Behavioral:  Negative for self-injury and depressed mood.        Objective   Vital Signs:   /80   Pulse 65   Temp 98 °F (36.7 °C) (Skin)   Ht 154.9 cm (60.98\")   Wt 66 kg (145 lb 6.4 oz)   SpO2 98%   BMI 27.49 kg/m²         Physical Exam  Vitals and nursing note reviewed.   Constitutional:       General: She is not in acute distress.     Appearance: Normal appearance. She is not toxic-appearing.   HENT:      Head: Atraumatic.      Right Ear: External ear normal.      Left Ear: External ear normal.      Nose: Nose normal.      Mouth/Throat:      Mouth: Mucous membranes are moist.   Eyes:      General:         Right eye: No discharge.         Left eye: No discharge.      Extraocular Movements: Extraocular movements intact.      Pupils: Pupils are equal, round, and reactive to light.   Neck:      Comments: No carotid bruits.  Cardiovascular:      Rate and Rhythm: Normal rate and regular rhythm.      Pulses: Normal pulses.      Heart sounds: Normal heart sounds. No murmur heard.     No gallop. "      Comments: Normal heart tones, no ectopy, no S3.  Pulmonary:      Effort: Pulmonary effort is normal. No respiratory distress.      Breath sounds: No wheezing, rhonchi or rales.      Comments: No labored respirations.  Abdominal:      General: There is no distension.      Palpations: Abdomen is soft. There is no mass.      Tenderness: There is no abdominal tenderness. There is no guarding.   Musculoskeletal:         General: No swelling or tenderness.      Cervical back: No tenderness.      Right lower leg: No edema.      Left lower leg: No edema.      Comments: No edema.   Skin:     General: Skin is warm and dry.      Findings: No rash.   Neurological:      General: No focal deficit present.      Mental Status: She is alert and oriented to person, place, and time. Mental status is at baseline.      Motor: No weakness.      Gait: Gait normal.   Psychiatric:         Mood and Affect: Mood normal.         Thought Content: Thought content normal.          Result Review   The following data was reviewed by: Reza Oakes MD on 03/21/2024:  [x] Laboratory  [] Microbiology  [x] Radiology  [x] EKG/telemetry  [] Cardiology/Vascular  [] Pathology  [x] Old records             Assessment and Plan   Diagnoses and all orders for this visit:    1. Essential hypertension (Primary)  Assessment & Plan:  Blood pressure with better control here recently.  She had been in the ER here in the past month or so with high readings.  She is back on Diovan 80 mg once or twice a day.  She bases whether she takes the evening dose on how the blood pressure is running.  When she was in the ER last she was given a prescription for amlodipine, but now that she is back on the valsartan routinely, she has not needed to use it.  She has plenty refills on hand, certainly appropriate to continue current plan of care.    Orders:  -     Comprehensive Metabolic Panel; Future    2. Mixed hyperlipidemia  Assessment & Plan:  Patient's LDL was 190 when last  checked in 8/23.  She was on low-dose pravastatin prior to this and LDL came down into the 120 ballpark.  Will go ahead and get her started back on the pravastatin and repeat labs in about 3 months.    Orders:  -     Lipid Panel; Future  -     CK; Future    3. Chronic gastric ulcer requiring drug therapy  Assessment & Plan:  Patient had a flare of this recently as of her 3/24 OV.  She has been using Carafate a couple of times a day and feels like it is calming things down.  No blood in her stool etc.  She had a CBC in the ER just last month, her hemoglobin was well normal at 14+.  She is going to finish up the Carafate she has on hand and then get back on over-the-counter PPI, certainly sounds appropriate.      4. Other fatigue  -     Vitamin B12 anemia; Future  -     Folate anemia; Future  -     CBC & Differential; Future  -     TSH; Future  -     T4, free; Future  -     Hemoglobin A1c; Future    5. Well adult health check  -     Hemoglobin A1c; Future    6. Abnormal finding of blood chemistry, unspecified  -     Hemoglobin A1c; Future    Other orders  -     amLODIPine (NORVASC) 5 MG tablet; Take 1 tablet by mouth Daily As Needed (Elevated blood pressure).  Dispense: 30 tablet; Refill: 1  -     pravastatin (PRAVACHOL) 20 MG tablet; Take 1 tablet by mouth Daily.  Dispense: 90 tablet; Refill: 1  -     tiZANidine (ZANAFLEX) 4 MG tablet; Take 1 tablet by mouth At Night As Needed for Muscle Spasms.  Dispense: 90 tablet; Refill: 1        BMI is >= 25 and <30. (Overweight) The following options were offered after discussion;: exercise counseling/recommendations and nutrition counseling/recommendations            Follow Up   Return in about 4 months (around 7/21/2024).  Patient was given instructions and counseling regarding her condition or for health maintenance advice. Please see specific information pulled into the AVS if appropriate.     Total Time Spent:   minutes     This time includes time spent by me in the  following activities: preparing for the visit, reviewing extensive past medical history and tests, performing a medically appropriate examination and/or evaluation, counseling and educating the patient and/or caregivers, ordering medications, tests, or procedures, referring and/or communicating with other health care professionals and documenting information in the medical record all on this date of service.

## 2024-03-21 NOTE — ASSESSMENT & PLAN NOTE
Patient's LDL was 190 when last checked in 8/23.  She was on low-dose pravastatin prior to this and LDL came down into the 120 ballpark.  Will go ahead and get her started back on the pravastatin and repeat labs in about 3 months.

## 2024-03-21 NOTE — ASSESSMENT & PLAN NOTE
Blood pressure with better control here recently.  She had been in the ER here in the past month or so with high readings.  She is back on Diovan 80 mg once or twice a day.  She bases whether she takes the evening dose on how the blood pressure is running.  When she was in the ER last she was given a prescription for amlodipine, but now that she is back on the valsartan routinely, she has not needed to use it.  She has plenty refills on hand, certainly appropriate to continue current plan of care.

## 2024-07-05 ENCOUNTER — LAB (OUTPATIENT)
Dept: INTERNAL MEDICINE | Age: 79
End: 2024-07-05
Payer: MEDICARE

## 2024-07-05 DIAGNOSIS — R79.9 ABNORMAL FINDING OF BLOOD CHEMISTRY, UNSPECIFIED: ICD-10-CM

## 2024-07-05 DIAGNOSIS — E78.2 MIXED HYPERLIPIDEMIA: ICD-10-CM

## 2024-07-05 DIAGNOSIS — Z00.00 WELL ADULT HEALTH CHECK: ICD-10-CM

## 2024-07-05 DIAGNOSIS — R53.83 OTHER FATIGUE: ICD-10-CM

## 2024-07-05 DIAGNOSIS — I10 ESSENTIAL HYPERTENSION: ICD-10-CM

## 2024-07-05 LAB
ALBUMIN SERPL-MCNC: 4.1 G/DL (ref 3.5–5.2)
ALBUMIN/GLOB SERPL: 1.4 G/DL
ALP SERPL-CCNC: 91 U/L (ref 39–117)
ALT SERPL W P-5'-P-CCNC: 11 U/L (ref 1–33)
ANION GAP SERPL CALCULATED.3IONS-SCNC: 11 MMOL/L (ref 5–15)
AST SERPL-CCNC: 15 U/L (ref 1–32)
BASOPHILS # BLD AUTO: 0.06 10*3/MM3 (ref 0–0.2)
BASOPHILS NFR BLD AUTO: 0.9 % (ref 0–1.5)
BILIRUB SERPL-MCNC: 0.7 MG/DL (ref 0–1.2)
BUN SERPL-MCNC: 19 MG/DL (ref 8–23)
BUN/CREAT SERPL: 19.2 (ref 7–25)
CALCIUM SPEC-SCNC: 9.1 MG/DL (ref 8.6–10.5)
CHLORIDE SERPL-SCNC: 106 MMOL/L (ref 98–107)
CHOLEST SERPL-MCNC: 227 MG/DL (ref 0–200)
CK SERPL-CCNC: 27 U/L (ref 20–180)
CO2 SERPL-SCNC: 24 MMOL/L (ref 22–29)
CREAT SERPL-MCNC: 0.99 MG/DL (ref 0.57–1)
DEPRECATED RDW RBC AUTO: 43.4 FL (ref 37–54)
EGFRCR SERPLBLD CKD-EPI 2021: 58.5 ML/MIN/1.73
EOSINOPHIL # BLD AUTO: 0.09 10*3/MM3 (ref 0–0.4)
EOSINOPHIL NFR BLD AUTO: 1.3 % (ref 0.3–6.2)
ERYTHROCYTE [DISTWIDTH] IN BLOOD BY AUTOMATED COUNT: 13.1 % (ref 12.3–15.4)
FOLATE SERPL-MCNC: 10.3 NG/ML (ref 4.78–24.2)
GLOBULIN UR ELPH-MCNC: 2.9 GM/DL
GLUCOSE SERPL-MCNC: 92 MG/DL (ref 65–99)
HBA1C MFR BLD: 5.1 % (ref 4.8–5.6)
HCT VFR BLD AUTO: 40.1 % (ref 34–46.6)
HDLC SERPL-MCNC: 58 MG/DL (ref 40–60)
HGB BLD-MCNC: 13.3 G/DL (ref 12–15.9)
IMM GRANULOCYTES # BLD AUTO: 0.02 10*3/MM3 (ref 0–0.05)
IMM GRANULOCYTES NFR BLD AUTO: 0.3 % (ref 0–0.5)
LDLC SERPL CALC-MCNC: 142 MG/DL (ref 0–100)
LDLC/HDLC SERPL: 2.39 {RATIO}
LYMPHOCYTES # BLD AUTO: 1.11 10*3/MM3 (ref 0.7–3.1)
LYMPHOCYTES NFR BLD AUTO: 16.5 % (ref 19.6–45.3)
MCH RBC QN AUTO: 29.9 PG (ref 26.6–33)
MCHC RBC AUTO-ENTMCNC: 33.2 G/DL (ref 31.5–35.7)
MCV RBC AUTO: 90.1 FL (ref 79–97)
MONOCYTES # BLD AUTO: 0.44 10*3/MM3 (ref 0.1–0.9)
MONOCYTES NFR BLD AUTO: 6.5 % (ref 5–12)
NEUTROPHILS NFR BLD AUTO: 5 10*3/MM3 (ref 1.7–7)
NEUTROPHILS NFR BLD AUTO: 74.5 % (ref 42.7–76)
NRBC BLD AUTO-RTO: 0 /100 WBC (ref 0–0.2)
PLATELET # BLD AUTO: 240 10*3/MM3 (ref 140–450)
PMV BLD AUTO: 11.7 FL (ref 6–12)
POTASSIUM SERPL-SCNC: 5 MMOL/L (ref 3.5–5.2)
PROT SERPL-MCNC: 7 G/DL (ref 6–8.5)
RBC # BLD AUTO: 4.45 10*6/MM3 (ref 3.77–5.28)
SODIUM SERPL-SCNC: 141 MMOL/L (ref 136–145)
T4 FREE SERPL-MCNC: 1.15 NG/DL (ref 0.92–1.68)
TRIGL SERPL-MCNC: 152 MG/DL (ref 0–150)
TSH SERPL DL<=0.05 MIU/L-ACNC: 1.95 UIU/ML (ref 0.27–4.2)
VIT B12 BLD-MCNC: 234 PG/ML (ref 211–946)
VLDLC SERPL-MCNC: 27 MG/DL (ref 5–40)
WBC NRBC COR # BLD AUTO: 6.72 10*3/MM3 (ref 3.4–10.8)

## 2024-07-05 PROCEDURE — 82607 VITAMIN B-12: CPT | Performed by: INTERNAL MEDICINE

## 2024-07-05 PROCEDURE — 80053 COMPREHEN METABOLIC PANEL: CPT | Performed by: INTERNAL MEDICINE

## 2024-07-05 PROCEDURE — 84439 ASSAY OF FREE THYROXINE: CPT | Performed by: INTERNAL MEDICINE

## 2024-07-05 PROCEDURE — 84443 ASSAY THYROID STIM HORMONE: CPT | Performed by: INTERNAL MEDICINE

## 2024-07-05 PROCEDURE — 80061 LIPID PANEL: CPT | Performed by: INTERNAL MEDICINE

## 2024-07-05 PROCEDURE — 83036 HEMOGLOBIN GLYCOSYLATED A1C: CPT | Performed by: INTERNAL MEDICINE

## 2024-07-05 PROCEDURE — 82550 ASSAY OF CK (CPK): CPT | Performed by: INTERNAL MEDICINE

## 2024-07-05 PROCEDURE — 36415 COLL VENOUS BLD VENIPUNCTURE: CPT | Performed by: INTERNAL MEDICINE

## 2024-07-05 PROCEDURE — 85025 COMPLETE CBC W/AUTO DIFF WBC: CPT | Performed by: INTERNAL MEDICINE

## 2024-07-05 PROCEDURE — 82746 ASSAY OF FOLIC ACID SERUM: CPT | Performed by: INTERNAL MEDICINE

## 2024-07-31 ENCOUNTER — OFFICE VISIT (OUTPATIENT)
Dept: INTERNAL MEDICINE | Age: 79
End: 2024-07-31
Payer: MEDICARE

## 2024-07-31 VITALS
HEIGHT: 61 IN | WEIGHT: 149 LBS | HEART RATE: 72 BPM | BODY MASS INDEX: 28.13 KG/M2 | TEMPERATURE: 97.8 F | DIASTOLIC BLOOD PRESSURE: 82 MMHG | OXYGEN SATURATION: 94 % | SYSTOLIC BLOOD PRESSURE: 142 MMHG

## 2024-07-31 DIAGNOSIS — Z87.898 HISTORY OF DIZZINESS: ICD-10-CM

## 2024-07-31 DIAGNOSIS — E04.2 MULTIPLE THYROID NODULES: ICD-10-CM

## 2024-07-31 DIAGNOSIS — E78.2 MIXED HYPERLIPIDEMIA: ICD-10-CM

## 2024-07-31 DIAGNOSIS — I10 ESSENTIAL HYPERTENSION: ICD-10-CM

## 2024-07-31 DIAGNOSIS — N18.31 STAGE 3A CHRONIC KIDNEY DISEASE: ICD-10-CM

## 2024-07-31 DIAGNOSIS — Z00.00 MEDICARE ANNUAL WELLNESS VISIT, SUBSEQUENT: Primary | ICD-10-CM

## 2024-07-31 PROBLEM — E55.9 VITAMIN D DEFICIENCY: Status: ACTIVE | Noted: 2024-07-31

## 2024-07-31 PROBLEM — N26.1 LEFT RENAL ATROPHY: Status: ACTIVE | Noted: 2024-07-31

## 2024-07-31 PROCEDURE — 1159F MED LIST DOCD IN RCRD: CPT | Performed by: INTERNAL MEDICINE

## 2024-07-31 PROCEDURE — 99214 OFFICE O/P EST MOD 30 MIN: CPT | Performed by: INTERNAL MEDICINE

## 2024-07-31 PROCEDURE — G0439 PPPS, SUBSEQ VISIT: HCPCS | Performed by: INTERNAL MEDICINE

## 2024-07-31 PROCEDURE — 1160F RVW MEDS BY RX/DR IN RCRD: CPT | Performed by: INTERNAL MEDICINE

## 2024-07-31 PROCEDURE — 1170F FXNL STATUS ASSESSED: CPT | Performed by: INTERNAL MEDICINE

## 2024-07-31 PROCEDURE — 3077F SYST BP >= 140 MM HG: CPT | Performed by: INTERNAL MEDICINE

## 2024-07-31 PROCEDURE — 3079F DIAST BP 80-89 MM HG: CPT | Performed by: INTERNAL MEDICINE

## 2024-07-31 RX ORDER — MECLIZINE HYDROCHLORIDE 25 MG/1
TABLET ORAL
Qty: 30 TABLET | Refills: 2 | Status: SHIPPED | OUTPATIENT
Start: 2024-07-31

## 2024-07-31 RX ORDER — PRAVASTATIN SODIUM 40 MG
40 TABLET ORAL NIGHTLY
Qty: 90 TABLET | Refills: 1 | Status: SHIPPED | OUTPATIENT
Start: 2024-07-31

## 2024-07-31 RX ORDER — SUCRALFATE 1 G/1
1 TABLET ORAL 2 TIMES DAILY
Qty: 180 TABLET | Refills: 1 | Status: SHIPPED | OUTPATIENT
Start: 2024-07-31

## 2024-07-31 NOTE — ASSESSMENT & PLAN NOTE
Patient is been monitored for this over the years, she wants to wait another year before checking it, we will get this in 2025.  Of note her thyroid labs are normal at her 7/24 OV.

## 2024-07-31 NOTE — ASSESSMENT & PLAN NOTE
Patient with longstanding history of this, she is been in the ER with them before, had a formal workup.  She was also sent to the vestibular clinic previously.    Blood pressure stable presently, she is not having any double vision etc.    Reviewed the previous head CT with results as noted above.

## 2024-07-31 NOTE — ASSESSMENT & PLAN NOTE
Patient's blood pressure is controlled as of her 7/24 OV.  I was under the impression she took Diovan every morning and then based whether she would take the second dose on what her blood pressure was running in the evening.  But if her blood pressure is in the 120 ballpark she will not take the morning dose either.    She has not needed to use any of the amlodipine here recently.  Believe that was given to her when she had some systolic pressures in the 200 ballpark in the ER.

## 2024-07-31 NOTE — ASSESSMENT & PLAN NOTE
Patient's LDL is down from 190 to 140 as of her 7/24 OV.  She is just on low-dose pravastatin, recommend we titrate that at this time.  Repeat labs in about 6 months.

## 2024-07-31 NOTE — PROGRESS NOTES
"Chief Complaint  Medicare Wellness-subsequent (Wellness exam. Lab follow up. Pt states dizziness / vertigo has been happening more frequently. Medication refill. )    Subjective      Bharti Nobles presents to [facility]    History of Present Illness  Patient is 79-year-old female with underlying hypertension, hyperlipidemia, history of gastric ulcer, and recurrent dizziness, who was seen in 4/24 as a new patient, and who is coming back in now 7/24 for initial laboratory follow-up.  We will review her med list, go over recent labs together, address care gaps and new concerns she may have.    Review of Systems   Constitutional:  Negative for appetite change, fatigue and fever.   HENT:  Negative for congestion and ear pain.    Eyes:  Negative for blurred vision.   Respiratory:  Negative for cough, chest tightness, shortness of breath and wheezing.    Cardiovascular:  Negative for chest pain, palpitations and leg swelling.   Gastrointestinal:  Negative for abdominal pain.   Genitourinary:  Negative for difficulty urinating, dysuria and hematuria.   Musculoskeletal:  Negative for arthralgias and gait problem.   Skin:  Negative for skin lesions.   Neurological:  Negative for syncope, memory problem and confusion.   Psychiatric/Behavioral:  Negative for self-injury and depressed mood.        Objective   Vital Signs:   /82   Pulse 72   Temp 97.8 °F (36.6 °C)   Ht 154.9 cm (60.98\")   Wt 67.6 kg (149 lb)   SpO2 94%   BMI 28.17 kg/m²         Physical Exam  Vitals and nursing note reviewed.   Constitutional:       General: She is not in acute distress.     Appearance: Normal appearance. She is not toxic-appearing.   HENT:      Head: Atraumatic.      Right Ear: External ear normal.      Left Ear: External ear normal.      Nose: Nose normal.      Mouth/Throat:      Mouth: Mucous membranes are moist.   Eyes:      General:         Right eye: No discharge.         Left eye: No " discharge.      Extraocular Movements: Extraocular movements intact.      Pupils: Pupils are equal, round, and reactive to light.   Neck:      Comments: No carotid bruits.  Cardiovascular:      Rate and Rhythm: Normal rate and regular rhythm.      Pulses: Normal pulses.      Heart sounds: Normal heart sounds. No murmur heard.     No gallop.      Comments: Normal heart tones, no ectopy, no S3.  Pulmonary:      Effort: Pulmonary effort is normal. No respiratory distress.      Breath sounds: No wheezing, rhonchi or rales.      Comments: No labored respirations.  Abdominal:      General: There is no distension.      Palpations: Abdomen is soft. There is no mass.      Tenderness: There is no abdominal tenderness. There is no guarding.   Musculoskeletal:         General: No swelling or tenderness.      Cervical back: No tenderness.      Right lower leg: No edema.      Left lower leg: No edema.      Comments: No edema.   Skin:     General: Skin is warm and dry.      Findings: No rash.   Neurological:      General: No focal deficit present.      Mental Status: She is alert and oriented to person, place, and time. Mental status is at baseline.      Motor: No weakness.      Gait: Gait normal.   Psychiatric:         Mood and Affect: Mood normal.         Thought Content: Thought content normal.          Result Review   The following data was reviewed by: Reza Oakes MD on 03/21/2024:  [x] Laboratory  [] Microbiology  [x] Radiology  [x] EKG/telemetry  [] Cardiology/Vascular  [] Pathology  [x] Old records             Assessment and Plan   Diagnoses and all orders for this visit:    1. Medicare annual wellness visit, subsequent (Primary)  Assessment & Plan:  AWV completed 7/24.  Patient remains active and independent.  She is having a flareup of her chronic dizziness presently, but no recent falls.  No overnight hospitalizations past year.  Her living will is already on file at the hospital.      2. Essential  hypertension  Assessment & Plan:  Patient's blood pressure is controlled as of her 7/24 OV.  I was under the impression she took Diovan every morning and then based whether she would take the second dose on what her blood pressure was running in the evening.  But if her blood pressure is in the 120 ballpark she will not take the morning dose either.    She has not needed to use any of the amlodipine here recently.  Believe that was given to her when she had some systolic pressures in the 200 ballpark in the ER.    Orders:  -     Comprehensive Metabolic Panel; Future    3. Mixed hyperlipidemia  Assessment & Plan:   Patient's LDL is down from 190 to 140 as of her 7/24 OV.  She is just on low-dose pravastatin, recommend we titrate that at this time.  Repeat labs in about 6 months.    Orders:  -     Lipid Panel; Future    4. History of dizziness  Overview:  HCT 11/22:  No acute intracranial abnormality is present. No evidence of acute cortical infarction, hemorrhage, mass or mass effect. No hydrocephalus or abnormal extra-axial fluid collections are present. The posterior fossa is unremarkable.   The skull base and calvarium are intact. The included portions of the paranasal sinuses and mastoid air cells are clear.     Assessment & Plan:  Patient with longstanding history of this, she is been in the ER with them before, had a formal workup.  She was also sent to the vestibular clinic previously.    Blood pressure stable presently, she is not having any double vision etc.    Reviewed the previous head CT with results as noted above.      5. Stage 3a chronic kidney disease  Overview:  Renal ultrasound 7/21:  L renal atrophy    The right kidney measures 4.8 x 5.2 benign 0.7 cm. The right kidney is   normal in size, shape, contour and position. A small 8 mm cortical   cyst is present. The cortical thickness is normal, with preservation   of the corticomedullary differentiation. The central echo complex is   compact with no  evidence for hydronephrosis. No nephrolithiasis or   abnormal perinephric fluid collections are seen. No hydroureter.   The left kidney measures 2.5 x 2.9 x 6.8 cm. The left kidney is   atrophic in size. The cortical thickness is thin measuring 3 to 6 mm,   with preservation of the corticomedullary differentiation. The central   echo complex is compact with no evidence for hydronephrosis. No   nephrolithiasis or abnormal perinephric fluid collections are seen. No   hydroureter.   Scanning through the pelvis reveals the bladder to be mildly distended   with anechoic urine. The wall thickness and contour are normal. There   is no surrounding ascites.     Assessment & Plan:  Patient's GFR is stable at 58 as of 7/21.  No concerning electrolyte abnormalities.      6. Multiple thyroid nodules  Overview:  Thyroid ultrasound 3/22:  The right lobe measures 4.1 x 1.6 x 1.8 cm. The right thyroid lobe is   heterogeneous. Multiple 2 mm hypoechoic nodule in the lower pole is   present. The left lobe measures 4.8 x 2.1 x 1.5 cm. Left lobe is heterogeneous.   Partially calcified nodule measuring 5 x 6 mm present in the upper   pole . The isthmus also shows shows no interval changes. No enlarged cervical lymph nodes are identified.     Assessment & Plan:  Patient is been monitored for this over the years, she wants to wait another year before checking it, we will get this in 2025.  Of note her thyroid labs are normal at her 7/24 OV.      Other orders  -     sucralfate (CARAFATE) 1 g tablet; Take 1 tablet by mouth 2 (Two) Times a Day.  Dispense: 180 tablet; Refill: 1  -     pravastatin (PRAVACHOL) 40 MG tablet; Take 1 tablet by mouth Every Night.  Dispense: 90 tablet; Refill: 1  -     meclizine (ANTIVERT) 25 MG tablet; 1/2 to 2 tablets up to 4 times a day as needed for severe dizziness.  Dispense: 30 tablet; Refill: 2          BMI is >= 25 and <30. (Overweight) The following options were offered after discussion;: exercise  counseling/recommendations and nutrition counseling/recommendations            Follow Up   Return in about 6 months (around 1/31/2025).  Patient was given instructions and counseling regarding her condition or for health maintenance advice. Please see specific information pulled into the AVS if appropriate.     Total Time Spent:   minutes     This time includes time spent by me in the following activities: preparing for the visit, reviewing extensive past medical history and tests, performing a medically appropriate examination and/or evaluation, counseling and educating the patient and/or caregivers, ordering medications, tests, or procedures, referring and/or communicating with other health care professionals and documenting information in the medical record all on this date of service.

## 2024-07-31 NOTE — ASSESSMENT & PLAN NOTE
PIPEV completed 7/24.  Patient remains active and independent.  She is having a flareup of her chronic dizziness presently, but no recent falls.  No overnight hospitalizations past year.  Her living will is already on file at the hospital.

## 2024-08-18 ENCOUNTER — APPOINTMENT (OUTPATIENT)
Dept: GENERAL RADIOLOGY | Facility: HOSPITAL | Age: 79
End: 2024-08-18
Payer: MEDICARE

## 2024-08-18 ENCOUNTER — APPOINTMENT (OUTPATIENT)
Dept: CT IMAGING | Facility: HOSPITAL | Age: 79
End: 2024-08-18
Payer: MEDICARE

## 2024-08-18 ENCOUNTER — HOSPITAL ENCOUNTER (EMERGENCY)
Facility: HOSPITAL | Age: 79
Discharge: HOME | End: 2024-08-18
Attending: EMERGENCY MEDICINE
Payer: MEDICARE

## 2024-08-18 VITALS
RESPIRATION RATE: 18 BRPM | HEART RATE: 65 BPM | WEIGHT: 149.69 LBS | BODY MASS INDEX: 28.26 KG/M2 | DIASTOLIC BLOOD PRESSURE: 58 MMHG | OXYGEN SATURATION: 98 % | SYSTOLIC BLOOD PRESSURE: 148 MMHG | TEMPERATURE: 97.9 F | HEIGHT: 61 IN

## 2024-08-18 DIAGNOSIS — H81.10 BENIGN PAROXYSMAL POSITIONAL VERTIGO, UNSPECIFIED LATERALITY: Primary | ICD-10-CM

## 2024-08-18 LAB
ALBUMIN SERPL-MCNC: 4.2 G/DL (ref 3.5–5.2)
ALBUMIN/GLOB SERPL: 1.2 G/DL
ALP SERPL-CCNC: 107 U/L (ref 39–117)
ALT SERPL W P-5'-P-CCNC: 15 U/L (ref 1–33)
ANION GAP SERPL CALCULATED.3IONS-SCNC: 11.8 MMOL/L (ref 5–15)
AST SERPL-CCNC: 19 U/L (ref 1–32)
BACTERIA UR QL AUTO: ABNORMAL /HPF
BASOPHILS # BLD AUTO: 0.07 10*3/MM3 (ref 0–0.2)
BASOPHILS NFR BLD AUTO: 0.9 % (ref 0–1.5)
BILIRUB SERPL-MCNC: 0.5 MG/DL (ref 0–1.2)
BILIRUB UR QL STRIP: NEGATIVE
BUN SERPL-MCNC: 22 MG/DL (ref 8–23)
BUN/CREAT SERPL: 21.4 (ref 7–25)
CALCIUM SPEC-SCNC: 9.2 MG/DL (ref 8.6–10.5)
CHLORIDE SERPL-SCNC: 104 MMOL/L (ref 98–107)
CLARITY UR: CLEAR
CO2 SERPL-SCNC: 24.2 MMOL/L (ref 22–29)
COLOR UR: YELLOW
CREAT SERPL-MCNC: 1.03 MG/DL (ref 0.57–1)
DEPRECATED RDW RBC AUTO: 45.9 FL (ref 37–54)
EGFRCR SERPLBLD CKD-EPI 2021: 55.4 ML/MIN/1.73
EOSINOPHIL # BLD AUTO: 0.13 10*3/MM3 (ref 0–0.4)
EOSINOPHIL NFR BLD AUTO: 1.6 % (ref 0.3–6.2)
ERYTHROCYTE [DISTWIDTH] IN BLOOD BY AUTOMATED COUNT: 13.5 % (ref 12.3–15.4)
GLOBULIN UR ELPH-MCNC: 3.4 GM/DL
GLUCOSE SERPL-MCNC: 91 MG/DL (ref 65–99)
GLUCOSE UR STRIP-MCNC: NEGATIVE MG/DL
HCT VFR BLD AUTO: 46.4 % (ref 34–46.6)
HGB BLD-MCNC: 14.9 G/DL (ref 12–15.9)
HGB UR QL STRIP.AUTO: NEGATIVE
HOLD SPECIMEN: NORMAL
HYALINE CASTS UR QL AUTO: ABNORMAL /LPF
IMM GRANULOCYTES # BLD AUTO: 0.03 10*3/MM3 (ref 0–0.05)
IMM GRANULOCYTES NFR BLD AUTO: 0.4 % (ref 0–0.5)
KETONES UR QL STRIP: NEGATIVE
LEUKOCYTE ESTERASE UR QL STRIP.AUTO: ABNORMAL
LYMPHOCYTES # BLD AUTO: 1.82 10*3/MM3 (ref 0.7–3.1)
LYMPHOCYTES NFR BLD AUTO: 22.6 % (ref 19.6–45.3)
MCH RBC QN AUTO: 29.6 PG (ref 26.6–33)
MCHC RBC AUTO-ENTMCNC: 32.1 G/DL (ref 31.5–35.7)
MCV RBC AUTO: 92.2 FL (ref 79–97)
MONOCYTES # BLD AUTO: 0.49 10*3/MM3 (ref 0.1–0.9)
MONOCYTES NFR BLD AUTO: 6.1 % (ref 5–12)
NEUTROPHILS NFR BLD AUTO: 5.51 10*3/MM3 (ref 1.7–7)
NEUTROPHILS NFR BLD AUTO: 68.4 % (ref 42.7–76)
NITRITE UR QL STRIP: NEGATIVE
NRBC BLD AUTO-RTO: 0 /100 WBC (ref 0–0.2)
NT-PROBNP SERPL-MCNC: 163.2 PG/ML (ref 0–1800)
PH UR STRIP.AUTO: 5.5 [PH] (ref 5–8)
PLATELET # BLD AUTO: 286 10*3/MM3 (ref 140–450)
PMV BLD AUTO: 10.5 FL (ref 6–12)
POTASSIUM SERPL-SCNC: 4 MMOL/L (ref 3.5–5.2)
PROT SERPL-MCNC: 7.6 G/DL (ref 6–8.5)
PROT UR QL STRIP: NEGATIVE
QT INTERVAL: 437 MS
QTC INTERVAL: 402 MS
RBC # BLD AUTO: 5.03 10*6/MM3 (ref 3.77–5.28)
RBC # UR STRIP: ABNORMAL /HPF
REF LAB TEST METHOD: ABNORMAL
SODIUM SERPL-SCNC: 140 MMOL/L (ref 136–145)
SP GR UR STRIP: 1.01 (ref 1–1.03)
SQUAMOUS #/AREA URNS HPF: ABNORMAL /HPF
TROPONIN T SERPL HS-MCNC: 11 NG/L
UROBILINOGEN UR QL STRIP: ABNORMAL
WBC # UR STRIP: ABNORMAL /HPF
WBC NRBC COR # BLD AUTO: 8.05 10*3/MM3 (ref 3.4–10.8)
WHOLE BLOOD HOLD COAG: NORMAL

## 2024-08-18 PROCEDURE — 84484 ASSAY OF TROPONIN QUANT: CPT | Performed by: EMERGENCY MEDICINE

## 2024-08-18 PROCEDURE — 99284 EMERGENCY DEPT VISIT MOD MDM: CPT

## 2024-08-18 PROCEDURE — 85025 COMPLETE CBC W/AUTO DIFF WBC: CPT | Performed by: EMERGENCY MEDICINE

## 2024-08-18 PROCEDURE — 80053 COMPREHEN METABOLIC PANEL: CPT | Performed by: EMERGENCY MEDICINE

## 2024-08-18 PROCEDURE — 87086 URINE CULTURE/COLONY COUNT: CPT | Performed by: EMERGENCY MEDICINE

## 2024-08-18 PROCEDURE — 81001 URINALYSIS AUTO W/SCOPE: CPT | Performed by: EMERGENCY MEDICINE

## 2024-08-18 PROCEDURE — 25810000003 SODIUM CHLORIDE 0.9 % SOLUTION: Performed by: EMERGENCY MEDICINE

## 2024-08-18 PROCEDURE — 70450 CT HEAD/BRAIN W/O DYE: CPT

## 2024-08-18 PROCEDURE — 96374 THER/PROPH/DIAG INJ IV PUSH: CPT

## 2024-08-18 PROCEDURE — 71045 X-RAY EXAM CHEST 1 VIEW: CPT

## 2024-08-18 PROCEDURE — 93005 ELECTROCARDIOGRAM TRACING: CPT | Performed by: EMERGENCY MEDICINE

## 2024-08-18 PROCEDURE — 25010000002 HYDRALAZINE PER 20 MG: Performed by: EMERGENCY MEDICINE

## 2024-08-18 PROCEDURE — 83880 ASSAY OF NATRIURETIC PEPTIDE: CPT | Performed by: EMERGENCY MEDICINE

## 2024-08-18 PROCEDURE — 96361 HYDRATE IV INFUSION ADD-ON: CPT

## 2024-08-18 RX ORDER — SODIUM CHLORIDE 0.9 % (FLUSH) 0.9 %
10 SYRINGE (ML) INJECTION AS NEEDED
Status: DISCONTINUED | OUTPATIENT
Start: 2024-08-18 | End: 2024-08-18 | Stop reason: HOSPADM

## 2024-08-18 RX ORDER — HYDRALAZINE HYDROCHLORIDE 20 MG/ML
10 INJECTION INTRAMUSCULAR; INTRAVENOUS ONCE
Status: COMPLETED | OUTPATIENT
Start: 2024-08-18 | End: 2024-08-18

## 2024-08-18 RX ADMIN — HYDRALAZINE HYDROCHLORIDE 10 MG: 20 INJECTION INTRAMUSCULAR; INTRAVENOUS at 17:45

## 2024-08-18 RX ADMIN — SODIUM CHLORIDE 1000 ML: 9 INJECTION, SOLUTION INTRAVENOUS at 17:11

## 2024-08-18 NOTE — DISCHARGE INSTRUCTIONS
Stay well-hydrated.  Consider going back to physical therapy and take the meclizine as prescribed.

## 2024-08-18 NOTE — ED PROVIDER NOTES
Time: 5:02 PM EDT  Date of encounter:  8/18/2024  Independent Historian/Clinical History and Information was obtained by:   Patient    History is limited by: N/A    Chief Complaint: Dizziness      History of Present Illness:  Patient is a 79 y.o. year old female who presents to the emergency department for evaluation of acute on chronic dizziness.  Patient states this has been ongoing for years.  In last 3 days it is worse.  There is a positional component as it is mostly when she stands.  Somewhat worsened with turning her head.  Denies any unilateral numbness tingling or focal weakness.  No vision or speech changes.  Patient denies headache despite what triage note states.  Patient states she does not drink fluids as she should.  Denies any recent illness, specifically denying dysuria, abdominal or chest pain, respiratory symptoms.  No vomiting or diarrhea.    Patient Care Team  Primary Care Provider: Reza Oakes MD    Past Medical History:     Allergies   Allergen Reactions    Adhesive Tape Rash     Past Medical History:   Diagnosis Date    Bradycardia     Depression     Hyperlipidemia     Hypertension      Past Surgical History:   Procedure Laterality Date    APPENDECTOMY      BREAST AUGMENTATION      CARDIAC CATHETERIZATION      HYSTERECTOMY      MASTECTOMY      TONSILLECTOMY       History reviewed. No pertinent family history.    Home Medications:  Prior to Admission medications    Medication Sig Start Date End Date Taking? Authorizing Provider   amLODIPine (NORVASC) 5 MG tablet Take 1 tablet by mouth Daily As Needed (Elevated blood pressure). 3/21/24   Reza Oakes MD   meclizine (ANTIVERT) 25 MG tablet 1/2 to 2 tablets up to 4 times a day as needed for severe dizziness. 7/31/24   Reza Oakes MD   omeprazole (priLOSEC) 20 MG capsule Take 1 capsule by mouth Daily.    Provider, MD Tessa   pravastatin (PRAVACHOL) 40 MG tablet Take 1 tablet by mouth Every Night. 7/31/24   Reza Oakes MD  "  sucralfate (CARAFATE) 1 g tablet Take 1 tablet by mouth 2 (Two) Times a Day. 7/31/24   Reza Oakes MD   tiZANidine (ZANAFLEX) 4 MG tablet Take 1 tablet by mouth At Night As Needed for Muscle Spasms. 3/21/24   Reza Oakes MD   valsartan (DIOVAN) 80 MG tablet Take 1 tablet by mouth 2 (Two) Times a Day. 3/21/24   Reza Oakes MD        Social History:   Social History     Tobacco Use    Smoking status: Never     Passive exposure: Never    Smokeless tobacco: Never   Vaping Use    Vaping status: Never Used   Substance Use Topics    Alcohol use: Yes     Comment: Occassionally, once a month    Drug use: No         Review of Systems:  Review of Systems   Constitutional:  Negative for chills and fever.   HENT:  Negative for congestion, rhinorrhea and sore throat.    Eyes:  Negative for photophobia.   Respiratory:  Negative for apnea, cough, chest tightness and shortness of breath.    Cardiovascular:  Negative for chest pain and palpitations.   Gastrointestinal:  Negative for abdominal pain, diarrhea, nausea and vomiting.   Endocrine: Negative.    Genitourinary:  Negative for difficulty urinating and dysuria.   Musculoskeletal:  Negative for back pain, joint swelling and myalgias.   Skin:  Negative for color change and wound.   Allergic/Immunologic: Negative.    Neurological:  Positive for dizziness. Negative for seizures and headaches.   Psychiatric/Behavioral: Negative.     All other systems reviewed and are negative.       Physical Exam:  /54   Pulse 72   Temp 98.6 °F (37 °C) (Oral)   Resp 18   Ht 154.9 cm (61\")   Wt 67.9 kg (149 lb 11.1 oz)   SpO2 97%   BMI 28.28 kg/m²     Physical Exam  Vitals and nursing note reviewed.   Constitutional:       General: She is awake.      Appearance: Normal appearance.   HENT:      Head: Normocephalic and atraumatic.      Nose: Nose normal.      Mouth/Throat:      Mouth: Mucous membranes are moist.   Eyes:      Extraocular Movements: Extraocular movements intact.    "   Pupils: Pupils are equal, round, and reactive to light.   Cardiovascular:      Rate and Rhythm: Normal rate and regular rhythm.      Heart sounds: Normal heart sounds.   Pulmonary:      Effort: Pulmonary effort is normal. No respiratory distress.      Breath sounds: Normal breath sounds. No wheezing, rhonchi or rales.   Abdominal:      General: Bowel sounds are normal.      Palpations: Abdomen is soft.      Tenderness: There is no abdominal tenderness. There is no guarding or rebound.      Comments: No rigidity   Musculoskeletal:         General: No tenderness. Normal range of motion.      Cervical back: Normal range of motion and neck supple.   Skin:     General: Skin is warm and dry.      Coloration: Skin is not jaundiced.   Neurological:      General: No focal deficit present.      Mental Status: She is alert. Mental status is at baseline.   Psychiatric:         Mood and Affect: Mood normal.                  Procedures:  Procedures      Medical Decision Making:      Comorbidities that affect care:    Hypertension, hyperlipidemia, depression    External Notes reviewed:    Previous Clinic Note: Family practice office visit 7/31/2024.  Description: Medicare annual wellness visit, essential hypertension.  Assessment and plan as follows:  Assessment & Plan  Medicare annual wellness visit, subsequent  AWV completed 7/24.  Patient remains active and independent.  She is having a flareup of her chronic dizziness presently, but no recent falls.  No overnight hospitalizations past year.  Her living will is already on file at the hospital.  Essential hypertension  Patient's blood pressure is controlled as of her 7/24 OV.  I was under the impression she took Diovan every morning and then based whether she would take the second dose on what her blood pressure was running in the evening.  But if her blood pressure is in the 120 ballpark she will not take the morning dose either.     She has not needed to use any of the  amlodipine here recently.  Believe that was given to her when she had some systolic pressures in the 200 ballpark in the ER.  Mixed hyperlipidemia   Patient's LDL is down from 190 to 140 as of her 7/24 OV.  She is just on low-dose pravastatin, recommend we titrate that at this time.  Repeat labs in about 6 months.  History of dizziness  Patient with longstanding history of this, she is been in the ER with them before, had a formal workup.  She was also sent to the vestibular clinic previously.     Blood pressure stable presently, she is not having any double vision etc.     Reviewed the previous head CT with results as noted above.  Stage 3a chronic kidney disease  Patient's GFR is stable at 58 as of 7/21.  No concerning electrolyte abnormalities.  Multiple thyroid nodules  Patient is been monitored for this over the years, she wants to wait another year before checking it, we will get this in 2025.  Of note her thyroid labs are normal at her 7/24 OV.       New Medications Ordered This Visit   Medications    sucralfate (CARAFATE) 1 g tablet       Sig: Take 1 tablet by mouth 2 (Two) Times a Day.       Dispense:  180 tablet       Refill:  1    pravastatin (PRAVACHOL) 40 MG tablet       Sig: Take 1 tablet by mouth Every Night.       Dispense:  90 tablet       Refill:  1            Follow Up:   Return in about 6 months (around 1/31/2025).                The following orders were placed and all results were independently analyzed by me:  Orders Placed This Encounter   Procedures    Urine Culture - Urine,    XR Chest 1 View    CT Head Without Contrast    Comprehensive Metabolic Panel    Urinalysis With Culture If Indicated - Urine, Clean Catch    Single High Sensitivity Troponin T    BNP    CBC Auto Differential    Convoy Draw    Urinalysis, Microscopic Only - Urine, Clean Catch    ECG 12 Lead Electrolyte Imbalance    Insert peripheral IV    CBC & Differential    Gold Top - SST    Light Blue Top       Medications Given  in the Emergency Department:  Medications   sodium chloride 0.9 % flush 10 mL (has no administration in time range)   sodium chloride 0.9 % bolus 1,000 mL (0 mL Intravenous Stopped 8/18/24 1820)   hydrALAZINE (APRESOLINE) injection 10 mg (10 mg Intravenous Given 8/18/24 1745)        ED Course:    ED Course as of 08/18/24 1942   Sun Aug 18, 2024   1934 I have personally interpreted the EKG today and it shows no evidence of any acute ischemia or heart arrhythmia. [RP]   1934 Evaluation: Patient happy to go home.  She declines antibiotics at this time preferring to wait for urine culture results as she has no dysuria [RP]      ED Course User Index  [RP] Kwan Zaman MD       Labs:    Lab Results (last 24 hours)       Procedure Component Value Units Date/Time    CBC & Differential [[id number]]  (Normal) Collected: 08/18/24 1657    Specimen: Blood Updated: 08/18/24 1705    Narrative:      The following orders were created for panel order CBC & Differential.  Procedure                               Abnormality         Status                     ---------                               -----------         ------                     CBC Auto Differential[[id number]]        Normal              Final result                 Please view results for these tests on the individual orders.    Comprehensive Metabolic Panel [[id number]]  (Abnormal) Collected: 08/18/24 1657    Specimen: Blood Updated: 08/18/24 1727     Glucose 91 mg/dL      BUN 22 mg/dL      Creatinine 1.03 mg/dL      Sodium 140 mmol/L      Potassium 4.0 mmol/L      Chloride 104 mmol/L      CO2 24.2 mmol/L      Calcium 9.2 mg/dL      Total Protein 7.6 g/dL      Albumin 4.2 g/dL      ALT (SGPT) 15 U/L      AST (SGOT) 19 U/L      Alkaline Phosphatase 107 U/L      Total Bilirubin 0.5 mg/dL      Globulin 3.4 gm/dL      A/G Ratio 1.2 g/dL      BUN/Creatinine Ratio 21.4     Anion Gap 11.8 mmol/L      eGFR 55.4 mL/min/1.73     Narrative:      GFR Normal >60  Chronic Kidney  Disease <60  Kidney Failure <15    The GFR formula is only valid for adults with stable renal function between ages 18 and 70.    Single High Sensitivity Troponin T [[id number]]  (Normal) Collected: 08/18/24 1657    Specimen: Blood Updated: 08/18/24 1727     HS Troponin T 11 ng/L     Narrative:      High Sensitive Troponin T Reference Range:  <14.0 ng/L- Negative Female for AMI  <22.0 ng/L- Negative Male for AMI  >=14 - Abnormal Female indicating possible myocardial injury.  >=22 - Abnormal Male indicating possible myocardial injury.   Clinicians would have to utilize clinical acumen, EKG, Troponin, and serial changes to determine if it is an Acute Myocardial Infarction or myocardial injury due to an underlying chronic condition.         BNP [[id number]]  (Normal) Collected: 08/18/24 1657    Specimen: Blood Updated: 08/18/24 1722     proBNP 163.2 pg/mL     Narrative:      This assay is used as an aid in the diagnosis of individuals suspected of having heart failure. It can be used as an aid in the diagnosis of acute decompensated heart failure (ADHF) in patients presenting with signs and symptoms of ADHF to the emergency department (ED). In addition, NT-proBNP of <300 pg/mL indicates ADHF is not likely.    Age Range Result Interpretation  NT-proBNP Concentration (pg/mL:      <50             Positive            >450                   Gray                 300-450                    Negative             <300    50-75           Positive            >900                  Gray                300-900                  Negative            <300      >75             Positive            >1800                  Gray                300-1800                  Negative            <300    CBC Auto Differential [[id number]]  (Normal) Collected: 08/18/24 1657    Specimen: Blood Updated: 08/18/24 1704     WBC 8.05 10*3/mm3      RBC 5.03 10*6/mm3      Hemoglobin 14.9 g/dL      Hematocrit 46.4 %      MCV 92.2 fL      MCH 29.6 pg      MCHC 32.1  g/dL      RDW 13.5 %      RDW-SD 45.9 fl      MPV 10.5 fL      Platelets 286 10*3/mm3      Neutrophil % 68.4 %      Lymphocyte % 22.6 %      Monocyte % 6.1 %      Eosinophil % 1.6 %      Basophil % 0.9 %      Immature Grans % 0.4 %      Neutrophils, Absolute 5.51 10*3/mm3      Lymphocytes, Absolute 1.82 10*3/mm3      Monocytes, Absolute 0.49 10*3/mm3      Eosinophils, Absolute 0.13 10*3/mm3      Basophils, Absolute 0.07 10*3/mm3      Immature Grans, Absolute 0.03 10*3/mm3      nRBC 0.0 /100 WBC     Urinalysis With Culture If Indicated - Urine, Clean Catch [[id number]]  (Abnormal) Collected: 08/18/24 1658    Specimen: Urine, Clean Catch Updated: 08/18/24 1708     Color, UA Yellow     Appearance, UA Clear     pH, UA 5.5     Specific Gravity, UA 1.010     Glucose, UA Negative     Ketones, UA Negative     Bilirubin, UA Negative     Blood, UA Negative     Protein, UA Negative     Leuk Esterase, UA Small (1+)     Nitrite, UA Negative     Urobilinogen, UA 0.2 E.U./dL    Narrative:      In absence of clinical symptoms, the presence of pyuria, bacteria, and/or nitrites on the urinalysis result does not correlate with infection.    Urinalysis, Microscopic Only - Urine, Clean Catch [[id number]]  (Abnormal) Collected: 08/18/24 1658    Specimen: Urine, Clean Catch Updated: 08/18/24 1708     RBC, UA 0-2 /HPF      WBC, UA 6-10 /HPF      Bacteria, UA None Seen /HPF      Squamous Epithelial Cells, UA 0-2 /HPF      Hyaline Casts, UA 0-2 /LPF      Methodology Automated Microscopy    Urine Culture - Urine, Urine, Clean Catch [[id number]] Collected: 08/18/24 1658    Specimen: Urine, Clean Catch Updated: 08/18/24 1708             Imaging:    CT Head Without Contrast    Result Date: 8/18/2024  CT HEAD WO CONTRAST Date of Exam: 8/18/2024 6:55 PM EDT Indication: Dizziness, headache. Comparison: CT head 10/14/2016 Technique: Axial CT images were obtained of the head without contrast administration.  Reconstructed coronal and sagittal images  were also obtained. Automated exposure control and iterative construction methods were used. Findings: The ventricles, sulci, and cerebellar folia are mildly and diffusely prominent consistent with atrophy. 1 cm area of encephalomalacia in the head of the caudate nucleus on the left is consistent with lacunar type infarct, not evident on the prior study. Ill-defined diminished density in cerebral white matter is consistent with mild underlying gliosis and/are scattered old lacunar infarcts. There is no CT evidence of acute intracranial hemorrhage, mass, or mass effect. The orbits have a normal appearance. Mild mucosal thickening is seen in ethmoid air cells. Nasal antral windows appear patent. The paranasal sinuses are well aerated. The middle ears and mastoid air cells are well aerated.     Impression: CT scan of the head without IV contrast demonstrating mild atrophy and white matter changes. 1 cm area of encephalomalacia in the head of the caudate nucleus on the left is consistent with old infarct. No acute intracranial abnormality is seen. Electronically Signed: Abhinav Tom MD  8/18/2024 7:03 PM EDT  Workstation ID: [id number]    XR Chest 1 View    Result Date: 8/18/2024  XR CHEST 1 VW Date of Exam: 8/18/2024 4:06 PM EDT Indication: dizziness Comparison: CXR 2/18/2024 Findings: The heart is normal in size. The lungs are well-expanded and free of infiltrates. Bony structures appear intact.     Impression: No active disease is seen. Electronically Signed: Abhinav Tom MD  8/18/2024 4:32 PM EDT  Workstation ID: [id number]       Differential Diagnosis and Discussion:    Dizziness: Based on the patient's history, signs, and symptoms, the diffential diagnosis includes but is not limited to meningitis, stroke, sepsis, subarachnoid hemorrhage, intracranial bleeding, encephalitis, vertigo, electrolyte imbalance, and metabolic disorders.    All labs were reviewed and interpreted by me.  All X-rays impressions were  independently interpreted by me.  EKG was interpreted by me.  CT scan radiology impression was interpreted by me.    MDM     Amount and/or Complexity of Data Reviewed  Decide to obtain previous medical records or to obtain history from someone other than the patient: yes                 Patient Care Considerations:    MRI: I considered ordering an MRI however this is a chronic issue with no focal neurologic deficits by history or physical exam.      Consultants/Shared Management Plan:    None    Social Determinants of Health:    Patient is independent, reliable, and has access to care.       Disposition and Care Coordination:    Discharged: The patient is suitable and stable for discharge with no need for consideration of admission.    I have explained the patient´s condition, diagnoses and treatment plan based on the information available to me at this time. I have answered questions and addressed any concerns. The patient has a good  understanding of the patient´s diagnosis, condition, and treatment plan as can be expected at this point. The vital signs have been stable. The patient´s condition is stable and appropriate for discharge from the emergency department.      The patient will pursue further outpatient evaluation with the primary care physician or other designated or consulting physician as outlined in the discharge instructions. They are agreeable to this plan of care and follow-up instructions have been explained in detail. The patient has received these instructions in written format and has expressed an understanding of the discharge instructions. The patient is aware that any significant change in condition or worsening of symptoms should prompt an immediate return to this or the closest emergency department or call to 911.    Final diagnoses:   Benign paroxysmal positional vertigo, unspecified laterality        ED Disposition       ED Disposition   Discharge    Condition   Stable    Comment   --                This medical record created using voice recognition software.             Kwan Zaman MD  08/18/24 1942

## 2024-08-19 LAB — BACTERIA SPEC AEROBE CULT: NORMAL

## 2024-08-20 ENCOUNTER — OFFICE VISIT (OUTPATIENT)
Dept: INTERNAL MEDICINE | Age: 79
End: 2024-08-20
Payer: MEDICARE

## 2024-08-20 VITALS
SYSTOLIC BLOOD PRESSURE: 142 MMHG | BODY MASS INDEX: 28.13 KG/M2 | WEIGHT: 149 LBS | HEART RATE: 50 BPM | DIASTOLIC BLOOD PRESSURE: 70 MMHG | OXYGEN SATURATION: 97 % | HEIGHT: 61 IN | TEMPERATURE: 97.8 F

## 2024-08-20 DIAGNOSIS — I63.9 CEREBROVASCULAR ACCIDENT (CVA), UNSPECIFIED MECHANISM: ICD-10-CM

## 2024-08-20 DIAGNOSIS — Z87.898 HISTORY OF DIZZINESS: ICD-10-CM

## 2024-08-20 DIAGNOSIS — Z09 HOSPITAL DISCHARGE FOLLOW-UP: Primary | ICD-10-CM

## 2024-08-20 DIAGNOSIS — I10 ESSENTIAL HYPERTENSION: ICD-10-CM

## 2024-08-20 PROCEDURE — 3078F DIAST BP <80 MM HG: CPT | Performed by: INTERNAL MEDICINE

## 2024-08-20 PROCEDURE — 3077F SYST BP >= 140 MM HG: CPT | Performed by: INTERNAL MEDICINE

## 2024-08-20 PROCEDURE — 99214 OFFICE O/P EST MOD 30 MIN: CPT | Performed by: INTERNAL MEDICINE

## 2024-08-20 RX ORDER — ASPIRIN 81 MG/1
81 TABLET ORAL TAKE AS DIRECTED
COMMUNITY

## 2024-08-20 RX ORDER — SCOLOPAMINE TRANSDERMAL SYSTEM 1 MG/1
1 PATCH, EXTENDED RELEASE TRANSDERMAL
Qty: 10 EACH | Refills: 2 | Status: SHIPPED | OUTPATIENT
Start: 2024-08-20

## 2024-08-20 RX ORDER — PROCHLORPERAZINE MALEATE 5 MG/1
TABLET ORAL
Qty: 20 TABLET | Refills: 0 | Status: SHIPPED | OUTPATIENT
Start: 2024-08-20

## 2024-08-20 NOTE — ASSESSMENT & PLAN NOTE
This is the indication for her 8/24 OV.  I reviewed the ER physicians records as well as the labs and imaging studies.  Please see the individualized headings for further details.

## 2024-08-20 NOTE — ASSESSMENT & PLAN NOTE
Patient seen in the ER 8/24 diagnosed with:  Benign paroxysmal positional vertigo, unspecified laterality     She had routine labs and a head CT.  The head CT result is as noted above, there is evidence of old stroke that is new since her November 2022 head CT.  Patient does not recall being told this previously, she is asymptomatic other than the dizziness, no focal motor deficits.  We will put in for MRI of brain and neck.

## 2024-08-20 NOTE — ASSESSMENT & PLAN NOTE
Blood pressure is stable as of her 8/24 OV.  She has not needed to use any of her valsartan recently, it is written as twice a day because occasionally she will take it that much, but other times does not take it at all.  Her systolic has been 120 in the mornings.  She did have elevated blood pressure when she was in the ER with acute dizziness and they gave her a one-time dose of hydralazine..  Stable to continue current plan of care.

## 2024-08-20 NOTE — PROGRESS NOTES
"Chief Complaint  Dizziness (Pt states that she is very dizzy, she states that when it hits her it is severe. She was in ER, they did CT scan and EKG. She states that she has taken two Meclizines with no relief.)    Subjective      Bharti Nobles presents to [facility]    Dizziness  Pertinent negatives include no abdominal pain, arthralgias, chest pain, congestion, coughing, fatigue or fever.     History of present illness:  Patient is 79-year-old female with underlying hypertension, hyperlipidemia, history of gastric ulcer, and recurrent dizziness, who was seen in 4/24 as a new patient, and who is coming back in now 7/24 for initial laboratory follow-up.  We will review her med list, go over recent labs together, address care gaps and new concerns she may have.---> Patient being seen in 8/24 for urgent visit/ER follow-up in regards to dizziness.    Review of Systems   Constitutional:  Negative for appetite change, fatigue and fever.   HENT:  Negative for congestion and ear pain.    Eyes:  Negative for blurred vision.   Respiratory:  Negative for cough, chest tightness, shortness of breath and wheezing.    Cardiovascular:  Negative for chest pain, palpitations and leg swelling.   Gastrointestinal:  Negative for abdominal pain.   Genitourinary:  Negative for difficulty urinating, dysuria and hematuria.   Musculoskeletal:  Negative for arthralgias and gait problem.   Skin:  Negative for skin lesions.   Neurological:  Positive for dizziness. Negative for syncope, memory problem and confusion.   Psychiatric/Behavioral:  Negative for self-injury and depressed mood.        Objective   Vital Signs:   /70   Pulse 50   Temp 97.8 °F (36.6 °C) (Skin)   Ht 154.9 cm (60.98\")   Wt 67.6 kg (149 lb)   SpO2 97%   BMI 28.17 kg/m²         Physical Exam  Vitals and nursing note reviewed.   Constitutional:       General: She is not in acute distress.     Appearance: Normal " appearance. She is not toxic-appearing.   HENT:      Head: Atraumatic.      Right Ear: External ear normal.      Left Ear: External ear normal.      Nose: Nose normal.      Mouth/Throat:      Mouth: Mucous membranes are moist.   Eyes:      General:         Right eye: No discharge.         Left eye: No discharge.      Extraocular Movements: Extraocular movements intact.      Pupils: Pupils are equal, round, and reactive to light.   Neck:      Comments: No carotid bruits.  Cardiovascular:      Rate and Rhythm: Normal rate and regular rhythm.      Pulses: Normal pulses.      Heart sounds: Normal heart sounds. No murmur heard.     No gallop.      Comments: Normal heart tones, no ectopy, no S3.  Pulmonary:      Effort: Pulmonary effort is normal. No respiratory distress.      Breath sounds: No wheezing, rhonchi or rales.      Comments: No labored respirations.  Abdominal:      General: There is no distension.      Palpations: Abdomen is soft. There is no mass.      Tenderness: There is no abdominal tenderness. There is no guarding.   Musculoskeletal:         General: No swelling or tenderness.      Cervical back: No tenderness.      Right lower leg: No edema.      Left lower leg: No edema.      Comments: No edema.   Skin:     General: Skin is warm and dry.      Findings: No rash.   Neurological:      General: No focal deficit present.      Mental Status: She is alert and oriented to person, place, and time. Mental status is at baseline.      Motor: No weakness.      Gait: Gait normal.   Psychiatric:         Mood and Affect: Mood normal.         Thought Content: Thought content normal.          Result Review   The following data was reviewed by: Reza Oakes MD on 03/21/2024:  [x] Laboratory  [] Microbiology  [x] Radiology  [x] EKG/telemetry  [] Cardiology/Vascular  [] Pathology  [x] Old records             Assessment and Plan   Diagnoses and all orders for this visit:    1. Hospital discharge follow-up  (Primary)  Assessment & Plan:  This is the indication for her 8/24 OV.  I reviewed the ER physicians records as well as the labs and imaging studies.  Please see the individualized headings for further details.      2. Cerebrovascular accident (CVA), unspecified mechanism  Assessment & Plan:  As below under the dizziness heading.  Will get patient started on baby aspirin as well.    Orders:  -     MRI Brain With & Without Contrast; Future  -     MRI Angiogram Head Without Contrast; Future  -     MRI Angiogram Neck With & Without Contrast; Future    3. History of dizziness  Overview:  Head CT 8/24:  CT scan of the head without IV contrast demonstrating mild atrophy and white matter changes.  2.  1 cm area of encephalomalacia in the head of the caudate nucleus on the left is consistent with old infarct. No acute intracranial abnormality is seen.    Head CT 11/22:  No acute intracranial abnormality is present. No evidence of acute cortical infarction, hemorrhage, mass or mass effect. No hydrocephalus or abnormal extra-axial fluid collections are present. The posterior fossa is unremarkable.   The skull base and calvarium are intact. The included portions of the paranasal sinuses and mastoid air cells are clear.     Assessment & Plan:  Patient seen in the ER 8/24 diagnosed with:  Benign paroxysmal positional vertigo, unspecified laterality     She had routine labs and a head CT.  The head CT result is as noted above, there is evidence of old stroke that is new since her November 2022 head CT.  Patient does not recall being told this previously, she is asymptomatic other than the dizziness, no focal motor deficits.  We will put in for MRI of brain and neck.          4. Essential hypertension  Assessment & Plan:  Blood pressure is stable as of her 8/24 OV.  She has not needed to use any of her valsartan recently, it is written as twice a day because occasionally she will take it that much, but other times does not take it  at all.  Her systolic has been 120 in the mornings.  She did have elevated blood pressure when she was in the ER with acute dizziness and they gave her a one-time dose of hydralazine..  Stable to continue current plan of care.      Other orders  -     Scopolamine 1 MG/3DAYS patch; Place 1 patch on the skin as directed by provider Every 72 (Seventy-Two) Hours.  Dispense: 10 each; Refill: 2  -     prochlorperazine (COMPAZINE) 5 MG tablet; 1 to 2 tablets every 6 hours as needed for severe dizziness.  Caution with driving.  Dispense: 20 tablet; Refill: 0            BMI is >= 25 and <30. (Overweight) The following options were offered after discussion;: exercise counseling/recommendations and nutrition counseling/recommendations            Follow Up   No follow-ups on file.  Patient was given instructions and counseling regarding her condition or for health maintenance advice. Please see specific information pulled into the AVS if appropriate.     Total Time Spent:   minutes     This time includes time spent by me in the following activities: preparing for the visit, reviewing extensive past medical history and tests, performing a medically appropriate examination and/or evaluation, counseling and educating the patient and/or caregivers, ordering medications, tests, or procedures, referring and/or communicating with other health care professionals and documenting information in the medical record all on this date of service.

## 2024-08-23 ENCOUNTER — TELEPHONE (OUTPATIENT)
Dept: INTERNAL MEDICINE | Age: 79
End: 2024-08-23
Payer: MEDICARE

## 2024-08-23 NOTE — TELEPHONE ENCOUNTER
Caller: Bharti Nobles    Relationship: Self    Best call back number: [phone]    What was the call regarding: PATIENT WOULD LIKE AN UPDATE ON MRI APPOINTMENT. SHE MENTIONED THAT SHE WOULD LIKE TO HAVE THIS DONE AS SOON AS POSSIBLE DUE TO HAVING MORE ISSUES WITH HEAD.

## 2024-09-06 LAB
QT INTERVAL: 437 MS
QTC INTERVAL: 402 MS

## 2024-10-19 ENCOUNTER — APPOINTMENT (OUTPATIENT)
Dept: GENERAL RADIOLOGY | Facility: HOSPITAL | Age: 79
End: 2024-10-19
Payer: MEDICARE

## 2024-10-19 ENCOUNTER — HOSPITAL ENCOUNTER (EMERGENCY)
Facility: HOSPITAL | Age: 79
Discharge: HOME | End: 2024-10-19
Attending: EMERGENCY MEDICINE
Payer: MEDICARE

## 2024-10-19 VITALS
RESPIRATION RATE: 21 BRPM | HEART RATE: 52 BPM | HEIGHT: 60 IN | WEIGHT: 150.57 LBS | SYSTOLIC BLOOD PRESSURE: 188 MMHG | DIASTOLIC BLOOD PRESSURE: 64 MMHG | OXYGEN SATURATION: 93 % | TEMPERATURE: 97.8 F | BODY MASS INDEX: 29.56 KG/M2

## 2024-10-19 DIAGNOSIS — M79.672 LEFT FOOT PAIN: Primary | ICD-10-CM

## 2024-10-19 LAB
HOLD SPECIMEN: NORMAL
HOLD SPECIMEN: NORMAL
QT INTERVAL: 433 MS
QTC INTERVAL: 428 MS
WHOLE BLOOD HOLD COAG: NORMAL
WHOLE BLOOD HOLD SPECIMEN: NORMAL

## 2024-10-19 PROCEDURE — 99284 EMERGENCY DEPT VISIT MOD MDM: CPT

## 2024-10-19 PROCEDURE — 93005 ELECTROCARDIOGRAM TRACING: CPT | Performed by: EMERGENCY MEDICINE

## 2024-10-19 PROCEDURE — 93010 ELECTROCARDIOGRAM REPORT: CPT | Performed by: INTERNAL MEDICINE

## 2024-10-19 PROCEDURE — 99283 EMERGENCY DEPT VISIT LOW MDM: CPT

## 2024-10-19 PROCEDURE — 73630 X-RAY EXAM OF FOOT: CPT

## 2024-10-19 RX ORDER — OXYCODONE AND ACETAMINOPHEN 7.5; 325 MG/1; MG/1
1 TABLET ORAL EVERY 6 HOURS PRN
Qty: 12 TABLET | Refills: 0 | Status: SHIPPED | OUTPATIENT
Start: 2024-10-19

## 2024-10-19 RX ORDER — SODIUM CHLORIDE 0.9 % (FLUSH) 0.9 %
10 SYRINGE (ML) INJECTION AS NEEDED
Status: DISCONTINUED | OUTPATIENT
Start: 2024-10-19 | End: 2024-10-19 | Stop reason: HOSPADM

## 2024-10-19 RX ORDER — OXYCODONE AND ACETAMINOPHEN 5; 325 MG/1; MG/1
1 TABLET ORAL ONCE
Status: COMPLETED | OUTPATIENT
Start: 2024-10-19 | End: 2024-10-19

## 2024-10-19 RX ADMIN — OXYCODONE HYDROCHLORIDE AND ACETAMINOPHEN 1 TABLET: 5; 325 TABLET ORAL at 13:39

## 2024-10-19 NOTE — ED PROVIDER NOTES
Time: 3:16 PM EDT  Date of encounter:  10/19/2024  Independent Historian/Clinical History and Information was obtained by:   Patient and Family    History is limited by: N/A    Chief Complaint: Left foot pain      History of Present Illness:  Patient is a 79 y.o. year old female who presents to the emergency department for evaluation of left foot pain and swelling.  Patient reports pain to her left foot that has previously had surgeries on it including fusions due to polio in the remote past.      Patient Care Team  Primary Care Provider: Reza Oakes MD    Past Medical History:     Allergies   Allergen Reactions    Adhesive Tape Rash     Past Medical History:   Diagnosis Date    Bradycardia     Depression     Hyperlipidemia     Hypertension      Past Surgical History:   Procedure Laterality Date    APPENDECTOMY      BREAST AUGMENTATION      CARDIAC CATHETERIZATION      HYSTERECTOMY      MASTECTOMY      TONSILLECTOMY       History reviewed. No pertinent family history.    Home Medications:  Prior to Admission medications    Medication Sig Start Date End Date Taking? Authorizing Provider   amLODIPine (NORVASC) 5 MG tablet Take 1 tablet by mouth Daily As Needed (Elevated blood pressure). 3/21/24   Reza Oakes MD   aspirin 81 MG EC tablet Take 1 tablet by mouth Take As Directed. 3 days a week    ProviderTessa MD   meclizine (ANTIVERT) 25 MG tablet TAKE 1/2 TO 2 TABLETS BY MOUTH UP TO FOUR TIMES DAILY AS NEEDED FOR DIZZINESS 9/1/24   Tessa Raymond MD   omeprazole (priLOSEC) 20 MG capsule Take 1 capsule by mouth Daily.    ProviderTessa MD   oxyCODONE-acetaminophen (PERCOCET) 7.5-325 MG per tablet Take 1 tablet by mouth Every 6 (Six) Hours As Needed for Severe Pain. 10/19/24   Beau Torres MD   pravastatin (PRAVACHOL) 40 MG tablet Take 1 tablet by mouth Every Night. 7/31/24   Reza Oakes MD   prochlorperazine (COMPAZINE) 5 MG tablet 1 to 2 tablets every 6 hours as needed for severe  "dizziness.  Caution with driving. 8/20/24   Reza Oakes MD   Scopolamine 1 MG/3DAYS patch Place 1 patch on the skin as directed by provider Every 72 (Seventy-Two) Hours. 8/20/24   Reza Oakes MD   sucralfate (CARAFATE) 1 g tablet Take 1 tablet by mouth 2 (Two) Times a Day. 7/31/24   Reza Oakes MD   tiZANidine (ZANAFLEX) 4 MG tablet Take 1 tablet by mouth At Night As Needed for Muscle Spasms. 3/21/24   Reza Oakes MD   valsartan (DIOVAN) 80 MG tablet Take 1 tablet by mouth 2 (Two) Times a Day. 3/21/24   Reza Oakes MD        Social History:   Social History     Tobacco Use    Smoking status: Never     Passive exposure: Never    Smokeless tobacco: Never   Vaping Use    Vaping status: Never Used   Substance Use Topics    Alcohol use: Yes     Comment: Occassionally, once a month    Drug use: No         Review of Systems:  Review of Systems   Constitutional:  Negative for chills and fever.   HENT:  Negative for congestion, rhinorrhea and sore throat.    Eyes:  Negative for pain and visual disturbance.   Respiratory:  Negative for apnea, cough, chest tightness and shortness of breath.    Cardiovascular:  Negative for chest pain and palpitations.   Gastrointestinal:  Negative for abdominal pain, diarrhea, nausea and vomiting.   Genitourinary:  Negative for difficulty urinating and dysuria.   Musculoskeletal:  Negative for joint swelling and myalgias.   Skin:  Negative for color change.   Neurological:  Negative for seizures and headaches.   Psychiatric/Behavioral: Negative.     All other systems reviewed and are negative.       Physical Exam:  BP (!) 188/64 (BP Location: Left arm, Patient Position: Lying)   Pulse 52   Temp 97.8 °F (36.6 °C) (Oral)   Resp 21   Ht 152.4 cm (60\")   Wt 68.3 kg (150 lb 9.2 oz)   SpO2 93%   BMI 29.41 kg/m²     Physical Exam  Vitals and nursing note reviewed.   Constitutional:       General: She is not in acute distress.     Appearance: Normal appearance. She is not " toxic-appearing.   HENT:      Head: Normocephalic and atraumatic.      Jaw: There is normal jaw occlusion.   Eyes:      General: Lids are normal.      Extraocular Movements: Extraocular movements intact.      Conjunctiva/sclera: Conjunctivae normal.      Pupils: Pupils are equal, round, and reactive to light.   Cardiovascular:      Rate and Rhythm: Normal rate and regular rhythm.      Pulses: Normal pulses.      Heart sounds: Normal heart sounds.   Pulmonary:      Effort: Pulmonary effort is normal. No respiratory distress.      Breath sounds: Normal breath sounds. No wheezing or rhonchi.   Abdominal:      General: Abdomen is flat.      Palpations: Abdomen is soft.      Tenderness: There is no abdominal tenderness. There is no guarding or rebound.   Musculoskeletal:      Cervical back: Normal range of motion and neck supple.      Right lower leg: No edema.      Comments: Left foot with mild edema and tenderness to palpation, mild swelling.  No overlying erythema or induration indicating infection.  No crepitus.   Skin:     General: Skin is warm and dry.   Neurological:      Mental Status: She is alert and oriented to person, place, and time. Mental status is at baseline.   Psychiatric:         Mood and Affect: Mood normal.                  Procedures:  Procedures      Medical Decision Making:      Comorbidities that affect care:    History of polio, chronic fusion of the left foot/ankle    External Notes reviewed:    Previous Clinic Note: Family medicine office visit for general medical management and for hospital discharge follow-up after CVA      The following orders were placed and all results were independently analyzed by me:  Orders Placed This Encounter   Procedures    XR Foot 3+ View Left    [place] Draw    ECG 12 Lead Other; HTN    Green Top (Gel)    Lavender Top    Gold Top - SST    Light Blue Top       Medications Given in the Emergency Department:  Medications   oxyCODONE-acetaminophen (PERCOCET) 5-325  MG per tablet 1 tablet (1 tablet Oral Given 10/19/24 1339)        ED Course:         Labs:    Lab Results (last 24 hours)       ** No results found for the last 24 hours. **             Imaging:    XR Foot 3+ View Left    Result Date: 10/19/2024  XR FOOT 3+ VW LEFT Date of Exam: 10/19/2024 2:00 PM EDT Indication: foot pain, swelling Comparison: None available. Findings: There is diffuse osteopenia. There is no acute fracture or dislocation. No bony erosion or abnormal periosteal reaction. There is mild narrowing of the first metatarsal phalangeal joint with mild valgus valgus deformity. There is fusion of the subtalar joint and tibiotalar joint and talonavicular joint. There is an intramedullary festus within the tibia which extends through the calcaneus. 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     Impression: 1. Postoperative fusion of the hindfoot and tibiotalar joint. 2. No acute bony abnormality. Electronically Signed: Rhett Ramirez MD  10/19/2024 2:14 PM EDT  Workstation ID: [id number]       Differential Diagnosis and Discussion:    Extremity Pain: Differential diagnosis includes but is not limited to soft tissue sprain, tendonitis, tendon injury, dislocation, fracture, deep vein thrombosis, arterial insufficiency, osteoarthritis, bursitis, and ligamentous damage.    All X-rays impressions were independently interpreted by me.    MDM  Number of Diagnoses or Management Options  Left foot pain  Diagnosis management comments: In summary this is a 79-year-old female who presents to the [facility] for evaluation of left foot pain.  I see no indication for Doppler ultrasound, no obvious signs of DVT.  There is foot pain and swelling to tenderness to palpation however x-ray shows arthritic changes and chronic fusion changes.  Patient is otherwise well-appearing in no acute distress.  She is given discharge prescription for pain control.  She does report similar  episodes that happened in the past and she has had 2 get new braces for her foot given her history of polio in her foot.  Very strict return to ER and follow-up instructions have been provided to the patient.                         Patient Care Considerations:    CT EXTREMITY: I considered ordering an extremity CT, however no signs of vascular compromise      Consultants/Shared Management Plan:    None    Social Determinants of Health:    Patient is independent, reliable, and has access to care.       Disposition and Care Coordination:    Discharged: The patient is suitable and stable for discharge with no need for consideration of admission.    I have explained the patient´s condition, diagnoses and treatment plan based on the information available to me at this time. I have answered questions and addressed any concerns. The patient has a good  understanding of the patient´s diagnosis, condition, and treatment plan as can be expected at this point. The vital signs have been stable. The patient´s condition is stable and appropriate for discharge from the emergency department.      The patient will pursue further outpatient evaluation with the primary care physician or other designated or consulting physician as outlined in the discharge instructions. They are agreeable to this plan of care and follow-up instructions have been explained in detail. The patient has received these instructions in written format and has expressed an understanding of the discharge instructions. The patient is aware that any significant change in condition or worsening of symptoms should prompt an immediate return to this or the closest emergency department or call to 911.  I have explained discharge medications and the need for follow up with the patient/caretakers. This was also printed in the discharge instructions. Patient was discharged with the following medications and follow up:      Medication List        New Prescriptions       oxyCODONE-acetaminophen 7.5-325 MG per tablet  Commonly known as: PERCOCET  Take 1 tablet by mouth Every 6 (Six) Hours As Needed for Severe Pain.               Where to Get Your Medications        These medications were sent to [id number] - [place], [place] - [address] AT [facility] - [phone]  - [phone] FX  [address], [place] [place] [address]      Phone: [phone]   oxyCODONE-acetaminophen 7.5-325 MG per tablet      Reza Oakes MD  [address]  [address]  [facility] [id number] [phone]    In 1 week         Final diagnoses:   Left foot pain        ED Disposition       ED Disposition   Discharge    Condition   Stable    Comment   --               This medical record created using voice recognition software.             Beau Torres MD  10/19/24 [id number]

## 2024-10-28 ENCOUNTER — TELEPHONE (OUTPATIENT)
Dept: ORTHOPEDIC SURGERY | Facility: CLINIC | Age: 79
End: 2024-10-28
Payer: MEDICARE

## 2024-10-28 NOTE — TELEPHONE ENCOUNTER
Caller: Bharti Nobles    Relationship to patient: Self    Best call back number:     Chief complaint: LEFT FOOT/ANKLE     Type of visit: NEW PROBLEM     Requested date: ASAP       Additional notes:PATIENT WENT TO ER 10/19/24 WITH LEFT FOOT PAIN AND WANTED DR BEEN TO CHECK IT OUT.  I ADVISED WE DONT REALLY SEE THIS DX MAYBE PODIATRY PLEASE REVIEW.  PATIENT HAS A CONDITION AND WANTS TO SEE IF WE CAN CHECK ON HER ANKLE FOR HER

## 2024-12-31 ENCOUNTER — OFFICE VISIT (OUTPATIENT)
Dept: INTERNAL MEDICINE | Age: 79
End: 2024-12-31
Payer: MEDICARE

## 2024-12-31 VITALS
SYSTOLIC BLOOD PRESSURE: 152 MMHG | HEIGHT: 60 IN | BODY MASS INDEX: 28.98 KG/M2 | OXYGEN SATURATION: 97 % | DIASTOLIC BLOOD PRESSURE: 74 MMHG | WEIGHT: 147.6 LBS | TEMPERATURE: 97.5 F | HEART RATE: 55 BPM

## 2024-12-31 DIAGNOSIS — I10 ESSENTIAL HYPERTENSION: ICD-10-CM

## 2024-12-31 DIAGNOSIS — R42 DIZZINESS ON STANDING: ICD-10-CM

## 2024-12-31 DIAGNOSIS — F33.41 RECURRENT MAJOR DEPRESSION IN PARTIAL REMISSION: ICD-10-CM

## 2024-12-31 DIAGNOSIS — Z87.898 HISTORY OF DIZZINESS: Primary | ICD-10-CM

## 2024-12-31 DIAGNOSIS — R29.6 RECURRENT FALLS WHILE WALKING: ICD-10-CM

## 2024-12-31 DIAGNOSIS — M25.361 KNEE GIVES OUT, RIGHT: ICD-10-CM

## 2024-12-31 PROBLEM — Z09 HOSPITAL DISCHARGE FOLLOW-UP: Status: RESOLVED | Noted: 2024-08-20 | Resolved: 2024-12-31

## 2024-12-31 PROCEDURE — G2211 COMPLEX E/M VISIT ADD ON: HCPCS | Performed by: INTERNAL MEDICINE

## 2024-12-31 PROCEDURE — 99214 OFFICE O/P EST MOD 30 MIN: CPT | Performed by: INTERNAL MEDICINE

## 2024-12-31 PROCEDURE — 3078F DIAST BP <80 MM HG: CPT | Performed by: INTERNAL MEDICINE

## 2024-12-31 PROCEDURE — 3077F SYST BP >= 140 MM HG: CPT | Performed by: INTERNAL MEDICINE

## 2024-12-31 PROCEDURE — 1160F RVW MEDS BY RX/DR IN RCRD: CPT | Performed by: INTERNAL MEDICINE

## 2024-12-31 PROCEDURE — 1159F MED LIST DOCD IN RCRD: CPT | Performed by: INTERNAL MEDICINE

## 2024-12-31 RX ORDER — ESCITALOPRAM OXALATE 10 MG/1
10 TABLET ORAL DAILY
Qty: 90 TABLET | Refills: 1 | Status: SHIPPED | OUTPATIENT
Start: 2024-12-31

## 2024-12-31 RX ORDER — VALSARTAN 80 MG/1
80 TABLET ORAL DAILY PRN
Status: SHIPPED | COMMUNITY
Start: 2024-12-31

## 2024-12-31 NOTE — ASSESSMENT & PLAN NOTE
Patient's blood pressure just mildly elevated as of her 12/24 OV.  She reports getting normal readings on average at home, so she is just using both valsartan and amlodipine as needed.

## 2024-12-31 NOTE — ASSESSMENT & PLAN NOTE
Patient was on 20 mg of Lexapro previously, she feels issues creeping back in as of her 12/24 OV.  Recommend we get her started on 10 mg initially and evaluate after that.

## 2024-12-31 NOTE — ASSESSMENT & PLAN NOTE
Patient apparently was felt to have BPPV when she was seen in the ER in 8/24, she is actually having stabbing and dizziness again as of her 12/24 OV, but she is not having any vertiginous symptoms.  She just feels off balance when she is up and ambulating.  She has not had any significantly low blood pressures at home.      Her blood pressure is certainly very reasonable presently and she is not orthostatic.    She missed the appt for the MRI/MRA's we ordered, but so will place them again.    Refer to Neuro as well.

## 2024-12-31 NOTE — ASSESSMENT & PLAN NOTE
This is as of her 12/24 OV.  She has seen Dr. Watt for hand issues in the past, will ask him to evaluate this in a timely manner.

## 2025-01-15 ENCOUNTER — OFFICE VISIT (OUTPATIENT)
Dept: ORTHOPEDIC SURGERY | Facility: CLINIC | Age: 80
End: 2025-01-15
Payer: MEDICARE

## 2025-01-15 VITALS
HEIGHT: 60 IN | OXYGEN SATURATION: 92 % | DIASTOLIC BLOOD PRESSURE: 81 MMHG | WEIGHT: 147 LBS | HEART RATE: 65 BPM | SYSTOLIC BLOOD PRESSURE: 156 MMHG | BODY MASS INDEX: 28.86 KG/M2

## 2025-01-15 DIAGNOSIS — M25.561 RIGHT KNEE PAIN, UNSPECIFIED CHRONICITY: Primary | ICD-10-CM

## 2025-01-15 NOTE — PROGRESS NOTES
"Chief Complaint  Initial Evaluation of the Right Knee     Subjective      Bharti Nobles presents to [facility] for initial evaluation of the right knee. She has gotten up and her knee hyperextends where she falls.  She notes she is very dizzy all the time.  She denies pain in the right knee.      Allergies   Allergen Reactions    Adhesive Tape Rash        Social History     Socioeconomic History    Marital status:    Tobacco Use    Smoking status: Never     Passive exposure: Never    Smokeless tobacco: Never   Vaping Use    Vaping status: Never Used   Substance and Sexual Activity    Alcohol use: Yes     Comment: Occassionally, once a month    Drug use: No    Sexual activity: Defer        I reviewed the patient's chief complaint, history of present illness, review of systems, past medical history, surgical history, family history, social history, medications, and allergy list.     Review of Systems     Constitutional: Denies fevers, chills, weight loss  Cardiovascular: Denies chest pain, shortness of breath  Skin: Denies rashes, acute skin changes  Neurologic: Denies headache, loss of consciousness        Vital Signs:   /81   Pulse 65   Ht 152.4 cm (60\")   Wt 66.7 kg (147 lb)   SpO2 92%   BMI 28.71 kg/m²          Physical Exam  General: Alert. No acute distress    Ortho Exam        RIGHT KNEE Flexion 125. Extension 0. Stable to varus/valgus stress. Stable to anterior/posterior drawer. Neurovascularly intact. Negative Sunday. Negative Lachman. Positive EHL, FHL, HS and TA. Sensation intact to light touch all 5 nerves of the foot. Ambulates with Antalgic gait. Patella is well tracking. Calf supple, non-tender. Negative tenderness to the medial joint line. Negative tenderness to the lateral joint line. Negative Crepitus. Good strength to hamstrings, quadriceps, dorsiflexors, and plantar flexors.  Knee Extensor Mechanism intact       Procedures        Imaging " Results (Most Recent)       Procedure Component Value Units Date/Time    XR Knee 3 View Right [[id number]] Resulted: 01/15/25 1109     Updated: 01/15/25 1113             Result Review :     X-Ray Report:  Right knee X-Ray  Indication: Evaluation of the right knee  AP/Lateral and [place] view(s)  Findings: Mild to moderate degenerative changes of the right knee.   Prior studies available for comparison: no       XR foot 3+ views left    Result Date: 12/19/2024  Narrative: 3 standing views of the left ankle and 2 standing views of the left foot were taken today. Intramedullary nail fixation of the tibiotalar and subtalar joints in appropriate position without signs of loosening or failure. Tibiotalar and STJ are fused. There are degenerative changes noted to the talonavicular joint, naviculocuneiform joints, and calcaneocuboid joint. No acute fracture or joint subluxation noted.     XR ankle 3+ views left    Result Date: 12/19/2024  Narrative: 3 standing views of the left ankle and 2 standing views of the left foot were taken today. Intramedullary nail fixation of the tibiotalar and subtalar joints in appropriate position without signs of loosening or failure. Tibiotalar and STJ are fused. There are degenerative changes noted to the talonavicular joint, naviculocuneiform joints, and calcaneocuboid joint. No acute fracture or joint subluxation noted.             Assessment and Plan     Diagnoses and all orders for this visit:    1. Right knee pain, unspecified chronicity (Primary)  -     XR Knee 3 View Right        Discussed the treatment plan with the patient. I reviewed the X-rays that were obtained today with the patient.     Modify activity as needed.  Continue with PCP with dizziness concerns.        Call or return if worsening symptoms.    Follow Up     PRN      Patient was given instructions and counseling regarding her condition or for health maintenance advice. Please see specific information pulled into the  AVS if appropriate.     Scribed for Jesus Arana MD by Varsha Graham MA.  01/15/25   11:12 EST      I have personally performed the services described in this document as scribed by the above individual and it is both accurate and complete. Jesus Arana MD 01/16/25

## 2025-02-04 RX ORDER — PRAVASTATIN SODIUM 40 MG
40 TABLET ORAL NIGHTLY
Qty: 90 TABLET | Refills: 1 | Status: SHIPPED | OUTPATIENT
Start: 2025-02-04

## 2025-02-07 ENCOUNTER — HOSPITAL ENCOUNTER (OUTPATIENT)
Dept: MRI IMAGING | Facility: HOSPITAL | Age: 80
Discharge: HOME | End: 2025-02-07
Payer: MEDICARE

## 2025-02-07 ENCOUNTER — LAB (OUTPATIENT)
Dept: LAB | Facility: HOSPITAL | Age: 80
End: 2025-02-07
Payer: MEDICARE

## 2025-02-07 DIAGNOSIS — Z87.898 HISTORY OF DIZZINESS: ICD-10-CM

## 2025-02-07 DIAGNOSIS — E78.2 MIXED HYPERLIPIDEMIA: ICD-10-CM

## 2025-02-07 DIAGNOSIS — I10 ESSENTIAL HYPERTENSION: ICD-10-CM

## 2025-02-07 LAB
ALBUMIN SERPL-MCNC: 3.8 G/DL (ref 3.5–5.2)
ALBUMIN/GLOB SERPL: 1.1 G/DL
ALP SERPL-CCNC: 124 U/L (ref 39–117)
ALT SERPL W P-5'-P-CCNC: 29 U/L (ref 1–33)
ANION GAP SERPL CALCULATED.3IONS-SCNC: 8.8 MMOL/L (ref 5–15)
AST SERPL-CCNC: 27 U/L (ref 1–32)
BILIRUB SERPL-MCNC: 0.8 MG/DL (ref 0–1.2)
BUN SERPL-MCNC: 15 MG/DL (ref 8–23)
BUN/CREAT SERPL: 14.7 (ref 7–25)
CALCIUM SPEC-SCNC: 9.4 MG/DL (ref 8.6–10.5)
CHLORIDE SERPL-SCNC: 104 MMOL/L (ref 98–107)
CHOLEST SERPL-MCNC: 234 MG/DL (ref 0–200)
CO2 SERPL-SCNC: 26.2 MMOL/L (ref 22–29)
CREAT BLDA-MCNC: 1 MG/DL (ref 0.6–1.3)
CREAT SERPL-MCNC: 1.02 MG/DL (ref 0.57–1)
EGFRCR SERPLBLD CKD-EPI 2021: 56.1 ML/MIN/1.73
EGFRCR SERPLBLD CKD-EPI 2021: 57.4 ML/MIN/1.73
GLOBULIN UR ELPH-MCNC: 3.4 GM/DL
GLUCOSE SERPL-MCNC: 84 MG/DL (ref 65–99)
HDLC SERPL-MCNC: 61 MG/DL (ref 40–60)
LDLC SERPL CALC-MCNC: 153 MG/DL (ref 0–100)
LDLC/HDLC SERPL: 2.46 {RATIO}
POTASSIUM SERPL-SCNC: 4.3 MMOL/L (ref 3.5–5.2)
PROT SERPL-MCNC: 7.2 G/DL (ref 6–8.5)
SODIUM SERPL-SCNC: 139 MMOL/L (ref 136–145)
TRIGL SERPL-MCNC: 115 MG/DL (ref 0–150)
VLDLC SERPL-MCNC: 20 MG/DL (ref 5–40)

## 2025-02-07 PROCEDURE — 70549 MR ANGIOGRAPH NECK W/O&W/DYE: CPT

## 2025-02-07 PROCEDURE — 70553 MRI BRAIN STEM W/O & W/DYE: CPT

## 2025-02-07 PROCEDURE — 80053 COMPREHEN METABOLIC PANEL: CPT

## 2025-02-07 PROCEDURE — 70544 MR ANGIOGRAPHY HEAD W/O DYE: CPT

## 2025-02-07 PROCEDURE — 82565 ASSAY OF CREATININE: CPT

## 2025-02-07 PROCEDURE — 25510000002 GADOBENATE DIMEGLUMINE 529 MG/ML SOLUTION: Performed by: INTERNAL MEDICINE

## 2025-02-07 PROCEDURE — 36415 COLL VENOUS BLD VENIPUNCTURE: CPT

## 2025-02-07 PROCEDURE — A9577 INJ MULTIHANCE: HCPCS | Performed by: INTERNAL MEDICINE

## 2025-02-07 PROCEDURE — 80061 LIPID PANEL: CPT

## 2025-02-07 RX ADMIN — GADOBENATE DIMEGLUMINE 15 ML: 529 INJECTION, SOLUTION INTRAVENOUS at 11:49

## 2025-02-17 RX ORDER — MECLIZINE HYDROCHLORIDE 25 MG/1
TABLET ORAL
Qty: 60 TABLET | Refills: 2 | Status: SHIPPED | OUTPATIENT
Start: 2025-02-17

## 2025-02-26 ENCOUNTER — OFFICE VISIT (OUTPATIENT)
Age: 80
End: 2025-02-26
Payer: MEDICARE

## 2025-02-26 VITALS
WEIGHT: 153.6 LBS | HEIGHT: 60 IN | HEART RATE: 61 BPM | BODY MASS INDEX: 30.15 KG/M2 | SYSTOLIC BLOOD PRESSURE: 110 MMHG | DIASTOLIC BLOOD PRESSURE: 70 MMHG | OXYGEN SATURATION: 98 %

## 2025-02-26 DIAGNOSIS — I10 ESSENTIAL HYPERTENSION: ICD-10-CM

## 2025-02-26 DIAGNOSIS — E53.8 VITAMIN B12 DEFICIENCY: ICD-10-CM

## 2025-02-26 DIAGNOSIS — R42 NONSPECIFIC DIZZINESS: ICD-10-CM

## 2025-02-26 DIAGNOSIS — N18.31 STAGE 3A CHRONIC KIDNEY DISEASE: ICD-10-CM

## 2025-02-26 DIAGNOSIS — E04.2 MULTIPLE THYROID NODULES: ICD-10-CM

## 2025-02-26 DIAGNOSIS — Z78.0 POSTMENOPAUSE: ICD-10-CM

## 2025-02-26 DIAGNOSIS — E78.2 MIXED HYPERLIPIDEMIA: Primary | ICD-10-CM

## 2025-02-26 DIAGNOSIS — Z87.898 HISTORY OF DIZZINESS: ICD-10-CM

## 2025-02-26 PROBLEM — R73.01 IMPAIRED FASTING GLUCOSE: Status: ACTIVE | Noted: 2025-02-26

## 2025-02-26 PROBLEM — M25.361: Status: RESOLVED | Noted: 2024-12-31 | Resolved: 2025-02-26

## 2025-02-26 PROCEDURE — G2211 COMPLEX E/M VISIT ADD ON: HCPCS | Performed by: INTERNAL MEDICINE

## 2025-02-26 PROCEDURE — 1159F MED LIST DOCD IN RCRD: CPT | Performed by: INTERNAL MEDICINE

## 2025-02-26 PROCEDURE — 3074F SYST BP LT 130 MM HG: CPT | Performed by: INTERNAL MEDICINE

## 2025-02-26 PROCEDURE — 99214 OFFICE O/P EST MOD 30 MIN: CPT | Performed by: INTERNAL MEDICINE

## 2025-02-26 PROCEDURE — 1160F RVW MEDS BY RX/DR IN RCRD: CPT | Performed by: INTERNAL MEDICINE

## 2025-02-26 PROCEDURE — 3078F DIAST BP <80 MM HG: CPT | Performed by: INTERNAL MEDICINE

## 2025-02-26 RX ORDER — AMLODIPINE BESYLATE 5 MG/1
2.5 TABLET ORAL DAILY PRN
Qty: 30 TABLET | Refills: 1 | Status: SHIPPED | OUTPATIENT
Start: 2025-02-26

## 2025-02-26 RX ORDER — SCOPOLAMINE 1 MG/3D
PATCH, EXTENDED RELEASE TRANSDERMAL
COMMUNITY
Start: 2025-02-18 | End: 2025-02-26 | Stop reason: SDUPTHER

## 2025-02-26 RX ORDER — ATORVASTATIN CALCIUM 20 MG/1
20 TABLET, FILM COATED ORAL NIGHTLY
Qty: 90 TABLET | Refills: 1 | Status: SHIPPED | OUTPATIENT
Start: 2025-02-26

## 2025-02-26 RX ORDER — SCOPOLAMINE 1 MG/3D
1 PATCH, EXTENDED RELEASE TRANSDERMAL
Qty: 10 EACH | Refills: 2 | Status: SHIPPED | OUTPATIENT
Start: 2025-02-26

## 2025-02-26 RX ORDER — VALSARTAN 80 MG/1
40 TABLET ORAL DAILY PRN
Qty: 30 TABLET | Refills: 1 | Status: SHIPPED | COMMUNITY
Start: 2025-02-26

## 2025-02-26 NOTE — PROGRESS NOTES
"Chief Complaint  Hyperlipidemia, Follow-up (Pt had labs, she states that this is routine. ), and Dizziness (She states that she has dizziness and was seen for this in December, she states that it has not gotten any better. She feels that she is shaking all over at times. She states that the Meclizine or the patch doesn't phase it. She had an MRI for this as well. )    Subjective      Bharti Nobles presents to [facility]    History of present illness:  Patient is 79-year-old female with underlying hypertension, hyperlipidemia, history of gastric ulcer, and recurrent dizziness, who was seen in 4/24 as a new patient, and who is coming back in now 2/25 for routine 3 to 6-month follow-up.  We will review her med list, go over recent labs together, address care gaps and new concerns she may have.    Review of Systems   Constitutional:  Negative for appetite change, fatigue and fever.   HENT:  Negative for congestion and ear pain.    Eyes:  Negative for blurred vision.   Respiratory:  Negative for cough, chest tightness, shortness of breath and wheezing.    Cardiovascular:  Negative for chest pain, palpitations and leg swelling.   Gastrointestinal:  Negative for abdominal pain.   Genitourinary:  Negative for difficulty urinating, dysuria and hematuria.   Musculoskeletal:  Negative for arthralgias and gait problem.   Skin:  Negative for skin lesions.   Neurological:  Positive for dizziness. Negative for syncope, memory problem and confusion.   Psychiatric/Behavioral:  Negative for self-injury and depressed mood.        Objective   Vital Signs:   /70   Pulse 61   Ht 152.4 cm (60\")   Wt 69.7 kg (153 lb 9.6 oz)   SpO2 98%   BMI 30.00 kg/m²         Physical Exam  Vitals and nursing note reviewed.   Constitutional:       General: She is not in acute distress.     Appearance: Normal appearance. She is not toxic-appearing.   HENT:      Head: Atraumatic.      Right Ear: " External ear normal.      Left Ear: External ear normal.      Nose: Nose normal.      Mouth/Throat:      Mouth: Mucous membranes are moist.   Eyes:      General:         Right eye: No discharge.         Left eye: No discharge.      Extraocular Movements: Extraocular movements intact.      Pupils: Pupils are equal, round, and reactive to light.   Neck:      Comments: No carotid bruits.  Cardiovascular:      Rate and Rhythm: Normal rate and regular rhythm.      Pulses: Normal pulses.      Heart sounds: Normal heart sounds. No murmur heard.     No gallop.      Comments: Normal heart tones, no ectopy, no S3.  Pulmonary:      Effort: Pulmonary effort is normal. No respiratory distress.      Breath sounds: No wheezing, rhonchi or rales.      Comments: No labored respirations.  Abdominal:      General: There is no distension.      Palpations: Abdomen is soft. There is no mass.      Tenderness: There is no abdominal tenderness. There is no guarding.   Musculoskeletal:         General: No swelling or tenderness.      Cervical back: No tenderness.      Right lower leg: No edema.      Left lower leg: No edema.      Comments: No edema.   Skin:     General: Skin is warm and dry.      Findings: No rash.   Neurological:      General: No focal deficit present.      Mental Status: She is alert and oriented to person, place, and time. Mental status is at baseline.      Motor: No weakness.      Gait: Gait normal.   Psychiatric:         Mood and Affect: Mood normal.         Thought Content: Thought content normal.          Result Review   The following data was reviewed by: Reza Oakes MD on 03/21/2024:  [x] Laboratory  [] Microbiology  [x] Radiology  [x] EKG/telemetry  [] Cardiology/Vascular  [] Pathology  [x] Old records             Assessment and Plan   Diagnoses and all orders for this visit:    1. Mixed hyperlipidemia (Primary)  Assessment & Plan:  LDL is no better at 150 as per her 2/25 labs.  She is on moderate dose  pravastatin so we will switch her to atorvastatin at this time repeat labs in about 3 months.    Orders:  -     Lipid panel; Future  -     CK; Future    2. Essential hypertension  Assessment & Plan:  Blood pressure was better on my repeat as per her 2/25 OV.  She has not checked her blood pressure at home recently, but previously readings are in the high normal to high range.      Patient is actually on no blood pressure medicines presently, she has 80 mg of valsartan and 5 mg of amlodipine.  She will just use these intermittently if she gets an elevated blood pressure reading at home.    Discussed with patient based on her readings today, that sounds reasonable, but she should only use a half a dose of each of them at a time.    Pt to call if BP remains elevated.    Orders:  -     CBC w AUTO Differential; Future    3. Stage 3a chronic kidney disease  Overview:  Renal ultrasound 7/21:  L renal atrophy    The right kidney measures 4.8 x 5.2 benign 0.7 cm. The right kidney is   normal in size, shape, contour and position. A small 8 mm cortical   cyst is present. The cortical thickness is normal, with preservation   of the corticomedullary differentiation. The central echo complex is   compact with no evidence for hydronephrosis. No nephrolithiasis or   abnormal perinephric fluid collections are seen. No hydroureter.   The left kidney measures 2.5 x 2.9 x 6.8 cm. The left kidney is   atrophic in size. The cortical thickness is thin measuring 3 to 6 mm,   with preservation of the corticomedullary differentiation. The central   echo complex is compact with no evidence for hydronephrosis. No   nephrolithiasis or abnormal perinephric fluid collections are seen. No   hydroureter.   Scanning through the pelvis reveals the bladder to be mildly distended   with anechoic urine. The wall thickness and contour are normal. There   is no surrounding ascites.     Assessment & Plan:  Patient's GFR is stable at 57 as per 2/25 labs.   No electrolyte abnormality, will monitor this with routine follow-up only.    Orders:  -     Comprehensive metabolic panel; Future    4. Vitamin B12 deficiency  -     Vitamin B12; Future  -     Folate; Future    5. Multiple thyroid nodules  Overview:  Thyroid ultrasound 3/22:  The right lobe measures 4.1 x 1.6 x 1.8 cm. The right thyroid lobe is   heterogeneous. Multiple 2 mm hypoechoic nodule in the lower pole is   present. The left lobe measures 4.8 x 2.1 x 1.5 cm. Left lobe is heterogeneous.   Partially calcified nodule measuring 5 x 6 mm present in the upper   pole . The isthmus also shows shows no interval changes. No enlarged cervical lymph nodes are identified.     Assessment & Plan:  We will place an order as of her 2/25 OV.    Orders:  -     TSH+Free T4; Future  -     US Thyroid; Future    6. History of dizziness  Overview:  Head CT 8/24:  CT scan of the head without IV contrast demonstrating mild atrophy and white matter changes.  2.  1 cm area of encephalomalacia in the head of the caudate nucleus on the left is consistent with old infarct. No acute intracranial abnormality is seen.    Head CT 11/22:  No acute intracranial abnormality is present. No evidence of acute cortical infarction, hemorrhage, mass or mass effect. No hydrocephalus or abnormal extra-axial fluid collections are present. The posterior fossa is unremarkable.   The skull base and calvarium are intact. The included portions of the paranasal sinuses and mastoid air cells are clear.     Assessment & Plan:  Patient had MRI/MRA of head and neck as of her 2/25 OV.  There was no concerning obvious pathology, certainly no recent strokes.    Unfortunately her symptoms are persisting, she is having to utilize scopolamine and meclizine, she is feeling weaker and not able to drive now.    Of note, patient is not orthostatic in the office today.      7. Nonspecific dizziness  -     Ambulatory Referral to ENT (Otolaryngology)    8. Postmenopause  -      DEXA Bone Density Axial; Future    Other orders  -     atorvastatin (LIPITOR) 20 MG tablet; Take 1 tablet by mouth Every Night.  Dispense: 90 tablet; Refill: 1  -     amLODIPine (NORVASC) 5 MG tablet; Take 0.5 tablets by mouth Daily As Needed (Elevated blood pressure).  Dispense: 30 tablet; Refill: 1  -     Scopolamine 1 MG/3DAYS patch; Place 1 patch on the skin as directed by provider Every 72 (Seventy-Two) Hours As Needed (Persistent dizziness).  Dispense: 10 each; Refill: 2              BMI is >= 25 and <30. (Overweight) The following options were offered after discussion;: exercise counseling/recommendations and nutrition counseling/recommendations            Follow Up   Return in about 3 months (around 5/26/2025).  Patient was given instructions and counseling regarding her condition or for health maintenance advice. Please see specific information pulled into the AVS if appropriate.     Total Time Spent:   minutes     This time includes time spent by me in the following activities: preparing for the visit, reviewing extensive past medical history and tests, performing a medically appropriate examination and/or evaluation, counseling and educating the patient and/or caregivers, ordering medications, tests, or procedures, referring and/or communicating with other health care professionals and documenting information in the medical record all on this date of service.

## 2025-02-26 NOTE — ASSESSMENT & PLAN NOTE
Patient's GFR is stable at 57 as per 2/25 labs.  No electrolyte abnormality, will monitor this with routine follow-up only.

## 2025-02-26 NOTE — ASSESSMENT & PLAN NOTE
Blood pressure was better on my repeat as per her 2/25 OV.  She has not checked her blood pressure at home recently, but previously readings are in the high normal to high range.      Patient is actually on no blood pressure medicines presently, she has 80 mg of valsartan and 5 mg of amlodipine.  She will just use these intermittently if she gets an elevated blood pressure reading at home.    Discussed with patient based on her readings today, that sounds reasonable, but she should only use a half a dose of each of them at a time.    Pt to call if BP remains elevated.

## 2025-02-26 NOTE — ASSESSMENT & PLAN NOTE
LDL is no better at 150 as per her 2/25 labs.  She is on moderate dose pravastatin so we will switch her to atorvastatin at this time repeat labs in about 3 months.

## 2025-02-26 NOTE — PATIENT INSTRUCTIONS
1.  As we discussed, take 2 of the 40 mg pravastatin at the same time until they are gone.    2.  Then start start just one of the atorvastatin 20 mg at a time.    3.  As we discussed, if you have elevated blood pressure readings at home and you are going to use amlodipine and/or valsartan, just use a half a tablet at a time.    4.  You have routine fasting labs to do in 3 months, just do these a day or so before your appointment.

## 2025-02-26 NOTE — ASSESSMENT & PLAN NOTE
Patient had MRI/MRA of head and neck as of her 2/25 OV.  There was no concerning obvious pathology, certainly no recent strokes.    Unfortunately her symptoms are persisting, she is having to utilize scopolamine and meclizine, she is feeling weaker and not able to drive now.    Of note, patient is not orthostatic in the office today.

## 2025-03-15 NOTE — PROGRESS NOTES
Chief Complaint  Dizziness on standing and recurrent falls walking.    Patient or patient representative verbalized consent for the use of Ambient Listening during the visit with  Fran Martinez MD PhD for chart documentation. 3/25/2025  13:50 EDT    Subjective      History of Present Illness:  Bharti Nobles is a delightful 79 y.o. right handed female s/p CVA who presents to [facility] with history of MDD, HTN, HLD, symptomatic bradycardia, chronic gastric ulcer, CKD 3, stress incontinence, vit D def, thyroid nodules, dizziness, referred for dizziness on standing with recurrent falls walking.    From PCP note 12/31/2024:  Patient reported dizziness with unsteady walking which started yesterday. She has recurrent dizziness and meclizine and scopolamine patches, but these did not help. BP yesterday was low for her in 120's and today high but more normal at 168/72. Daughter reported she has fallen 3 times recently and pt stated her right leg thi but this is not related to dizziness. 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 BP is reasonable today and she is not orthostatic. Home BPs have not been low.   ER visit in 8/2024 suspected BPPV for feeling off balance on ambulation, but she is not vertiginous.  MRI/MRA brain ordered and refer to Neuro.   Refer to Ortho for right knee buckle. Restart low dose Lexapro for depression      History of Present Illness  The patient is a 79-year-old female referred to neurology for dizziness upon standing and recurrent falls while walking.    She began experiencing ambulatory difficulties in 01/2025, characterized by her right leg buckling after a few steps, leading to falls. These episodes occurred twice without any prior warning or associated pain. She has a long-standing history of low back pain dating back to the 1970s, which initially radiated  to her left leg but was managed through chiropractic care and traction in the 1980s. She also received three injections from her primary care physician in the 1970s, which effectively alleviated her symptoms. Since then, she has not experienced any exacerbation of her back pain or radiating leg pain. She reports no recent falls or self-injury from the previous falls. She does not experience claudication or lumbar stenosis symptoms. Her orthopedic consultation suggested that her leg buckling might be due to dizziness.    She has been experiencing recurrent episodes of dizziness since 2019, usually last about a few days to 3 weeks. However, most recent episode starting in DEC 2024 has been the longest lasting and most intense dizziness she has had. She described her dizziness as a sensation of instability and vague head discomfort, which is constant but can be temporarily relieved by lying down or sitting still. Denied vertigo. She reports no increase in dizziness during the episodes of leg buckling. Her dizziness is often triggered by positional changes, such as standing up, but does not significantly impact her ability to walk once she starts moving. She has a history of motion sickness as a child but reports no vertigo, migraines, or headaches. She also reports no anxiety or depression. Rapid visual stimuli do not exacerbate her symptoms. She has no history of alcohol or tobacco use.    Dizziness has been going on so long that her son can tell by hearing her voice on the phone or by seeing her whether or not the dizziness is mild, moderate or severe.  Pt does not know what they are hearing or seeing to give a clue of how severe the dizziness is.  Vestibular therapy in past has helped.    She has had a cardiac work up when dizziness was severe and it was unrevealing per patient.    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    SOCIAL HISTORY  She does not smoke or drink alcohol.        All available  pertinent Labs and Imaging personally reviewed, including:    Imaging:  Results for orders placed during the hospital encounter of 02/07/25    MRI/MRA Brain and Neck With & Without Contrast 2/7/2025 for persistent dizziness:  Impression  1.Mild chronic microvascular ischemic change.  2.No diffusion restriction is identified to suggest acute infarct.  3.No abnormal contrast enhancement is identified.  4.Somewhat diminutive left posterior cerebral artery is not well visualized beyond the P2 segment. This may be related to technical factors. Stenosis or age-indeterminate occlusion of this vessel cannot be excluded. No evidence of ischemia within the left PCA territory. If clinically indicated, further evaluation with head CTA may be considered.  5.Otherwise, no significant abnormality is identified within the large arteries of the head or neck.  6.No significant stenosis of the bilateral internal carotid arteries.      Labs:  Lab Results   Component Value Date    WBC 8.05 08/18/2024    HGB 14.9 08/18/2024    HCT 46.4 08/18/2024    MCV 92.2 08/18/2024     08/18/2024     Lab Results   Component Value Date    GLUCOSE 84 02/07/2025    BUN 15 02/07/2025    CREATININE 1.00 02/07/2025     02/07/2025    K 4.3 02/07/2025     02/07/2025    CALCIUM 9.4 02/07/2025    PROTEINTOT 7.2 02/07/2025    ALBUMIN 3.8 02/07/2025    ALT 29 02/07/2025    AST 27 02/07/2025    ALKPHOS 124 (H) 02/07/2025    BILITOT 0.8 02/07/2025    GLOB 3.4 02/07/2025    AGRATIO 1.1 02/07/2025    BCR 14.7 02/07/2025    ANIONGAP 8.8 02/07/2025    EGFR 57.4 (L) 02/07/2025     Lab Results   Component Value Date    HGBA1C 5.10 07/05/2024     Lab Results   Component Value Date    TSH 1.950 07/05/2024     Lab Results   Component Value Date    NLOYJEZJ44 234 07/05/2024     Lab Results   Component Value Date    FOLATE 10.30 07/05/2024     Lab Results   Component Value Date    CHOL 234 (H) 02/07/2025    CHLPL 275 (H) 08/29/2023    TRIG 115 02/07/2025  "   HDL 61 (H) 02/07/2025     (H) 02/07/2025         Objective   Vital Signs:   /63   Pulse 57   Ht 152.4 cm (60\")   Wt 67.8 kg (149 lb 8 oz)   BMI 29.20 kg/m²     GENERAL:  GEN: mild owgt, well appearing, no apparent distress, appropriately dressed and groomed.  HEENT: NCAT, nml symm facies, no LNA, no goiter  LUNGS: CTA jose, no wheezes/crackles/rhonchi noted  Card: RRR, no m noted, no carotid bruit, jose rad and dp pulses 2+ with cap refill < 2 sec  EXT: no cce, no rash noted    NEUROLOGICAL:  MS: A+O x 3, language spontaneous, fluent with logical content, no word finding, no repeating or perseveration, reported own and regarded as excellent historian, normal judgement and insight, mood euthymic;   CN: PERRLA, full excursions in all cardinal directions with smooth pursuits throughout without saccadic breaks or nystagmus noted; horizontal and vertical saccades symmetric and on target. Cover test reveals no phoria. Visual fields full to confrontation. V1-III Light touch symm and intact; symm jaw clench masseter and temporalis bulk and tone, medial and lateral pterygoids intact. Symm forehead crease and grimace. Hearing grossly symm and intact to finger rub. Uvula and soft palate midline rise on gag. Sternocleidomastoids and traps symm and 5/5. Tongue demonstrates no furrowing and extends midline and into left and right.  MOTOR: no atrophy/drift, normal tone, strength 5/5 except 4/5 diffusely left leg (polio, festus in place), no adventitial movements or tremor noted  SENSORY: Severe vib loss in toes. Romberg - POS eyes open  COORD: SANDI/FTN/HTS with good rhythm, speed, and amplitude. No ataxia noted.  GAIT: Native wide based left limp gait with decreased stride, nml symm jose armswing, nml start/stop/turn. Toe and heel walk without difficulty. Tandem walk without difficulty.  DTR's: 2+ throughout; no clonus, Babinski - Absent, Lu - absent.     Provocative tests:   Skew deviation (cross cover vertical " misalignment increases risk central lesion): negative  Ilene-Hallpike: not indicated      ASSESSMENT & PLAN:         Diagnoses and all orders for this visit:    1. Episode of dizziness (Primary)    2. Symmetric sensory neuropathy         Assessment & Plan  1. Dizziness.  She reports experiencing dizziness since December, which has been more severe and longer-lasting than previous episodes. The dizziness is described as vague, centered in her head, and associated with an unsteady walk. It is sometimes worsened by standing up but improves when lying down or sitting for a while. There is no history of vertigo, migraines, or headaches. Constant dizziness with unstable walking and worse on standing but present on sitting but absent when laying down may be associated with widened pulse width. In addition, suspect contribution to dizziness walking from severe vib loss in feet with loss of proprioception. Although patient has a h/o childhood motion sickness, she denies s/s consistent with PPPD or chronic subjective dizziness. An MRI and MRA of the brain conducted in August 2024 did not show significant findings. She is advised to continue monitoring her symptoms and avoid sudden position changes to prevent worsening dizziness.   Patient can count potatoes on standing and walk forward only once dizziness has improved.    2. Recurrent falls.  She has experienced 2 falls since January, attributed to her right leg buckling unexpectedly; neither with self-injury. There is no associated pain, warning signs, or recent increase in back pain. She has a history of low back pain since the 1970s, which was managed with chiropractic care and traction. The falls are not associated with increased dizziness. She is advised to use support when walking and to take precautions to prevent falls.    3. Neuropathy  On exam she has severe vib loss which will contribute along with back pain and left leg polio s/p festus to gait instability she has  described. This was explained in detail to the patient and she is advised to be cautious walking even in house, and if necessary to help avoid near falls and/or falls to use her walker at all time. Most severe fall occur in the house. She will need night lights, seated shower, and caution walking on irregular terrain because her feet have sensory loss and little proprioception.      PLAN:  -Neuropathy lab screen today.  -Advised caution walking to help avoid near falls and falls.      Follow Up  No follow-ups on file.    79 minutes were spent caring for Dorcas on this date of service. This time spent by me includes preparing for the visit, reviewing tests, obtaining/reviewing separately obtained history, performing medically appropriate exam/evaluation, counseling/educating the patient/family/caregiver, ordering medications/tests/procedures, referring/communicating with other health care professionals, documenting information in the medical record, independently interpreting results and communicating that with the patient/family/caregiver and/or care coordination.     Patient was given instructions and counseling regarding her condition or for health maintenance advice. Please see specific information pulled into the AVS if appropriate.

## 2025-03-17 ENCOUNTER — TELEPHONE (OUTPATIENT)
Dept: OTOLARYNGOLOGY | Facility: CLINIC | Age: 80
End: 2025-03-17
Payer: MEDICARE

## 2025-03-17 ENCOUNTER — TELEPHONE (OUTPATIENT)
Age: 80
End: 2025-03-17

## 2025-03-17 RX ORDER — CEPHALEXIN 500 MG/1
500 CAPSULE ORAL 3 TIMES DAILY
Qty: 21 CAPSULE | Refills: 0 | Status: SHIPPED | OUTPATIENT
Start: 2025-03-17

## 2025-03-17 NOTE — TELEPHONE ENCOUNTER
HUB TO RELAY - Left message with patient asking for a call back to reschedule audiology appointment before follow-up appointment on 4/24/25.

## 2025-03-17 NOTE — TELEPHONE ENCOUNTER
Name: Deb Noblesndjennifer Benton    Relationship: Self    Best Callback Number: [phone]     HUB PROVIDED THE RELAY MESSAGE FROM THE OFFICE   PATIENT SCHEDULED AS REQUESTED    ADDITIONAL INFORMATION:

## 2025-03-17 NOTE — TELEPHONE ENCOUNTER
Caller: Bharti Nobles    Relationship: Self    Best call back number:     [phone]        What medication are you requesting: SOMETHING FOR SYMPTOMS    What are your current symptoms:  COUGHING- FEVER (102)-   How long have you been experiencing symptoms: ALYCE 3.16.2025        If a prescription is needed, what is your preferred pharmacy and phone number: [id number] - [place], [place] - [address] AT [facility] [phone] [facility] [phone]

## 2025-03-20 DIAGNOSIS — H91.90 HEARING LOSS, UNSPECIFIED HEARING LOSS TYPE, UNSPECIFIED LATERALITY: Primary | ICD-10-CM

## 2025-03-25 ENCOUNTER — PROCEDURE VISIT (OUTPATIENT)
Dept: OTOLARYNGOLOGY | Facility: CLINIC | Age: 80
End: 2025-03-25
Payer: MEDICARE

## 2025-03-25 ENCOUNTER — OFFICE VISIT (OUTPATIENT)
Dept: NEUROLOGY | Facility: CLINIC | Age: 80
End: 2025-03-25
Payer: MEDICARE

## 2025-03-25 ENCOUNTER — LAB (OUTPATIENT)
Dept: LAB | Facility: HOSPITAL | Age: 80
End: 2025-03-25
Payer: MEDICARE

## 2025-03-25 VITALS
HEIGHT: 60 IN | WEIGHT: 149.5 LBS | SYSTOLIC BLOOD PRESSURE: 134 MMHG | DIASTOLIC BLOOD PRESSURE: 63 MMHG | HEART RATE: 57 BPM | BODY MASS INDEX: 29.35 KG/M2

## 2025-03-25 DIAGNOSIS — G60.8: ICD-10-CM

## 2025-03-25 DIAGNOSIS — H90.A22 SENSORINEURAL HEARING LOSS (SNHL) OF LEFT EAR WITH RESTRICTED HEARING OF RIGHT EAR: ICD-10-CM

## 2025-03-25 DIAGNOSIS — R42 EPISODE OF DIZZINESS: Primary | ICD-10-CM

## 2025-03-25 DIAGNOSIS — R42 DIZZINESS: Primary | ICD-10-CM

## 2025-03-25 DIAGNOSIS — H90.A21 SENSORINEURAL HEARING LOSS (SNHL) OF RIGHT EAR WITH RESTRICTED HEARING OF LEFT EAR: ICD-10-CM

## 2025-03-25 LAB
ALBUMIN SERPL-MCNC: 3.7 G/DL (ref 3.5–5.2)
ALBUMIN/GLOB SERPL: 0.9 G/DL
ALP SERPL-CCNC: 98 U/L (ref 39–117)
ALT SERPL W P-5'-P-CCNC: 11 U/L (ref 1–33)
ANION GAP SERPL CALCULATED.3IONS-SCNC: 12.7 MMOL/L (ref 5–15)
AST SERPL-CCNC: 20 U/L (ref 1–32)
BILIRUB SERPL-MCNC: 0.7 MG/DL (ref 0–1.2)
BUN SERPL-MCNC: 16 MG/DL (ref 8–23)
BUN/CREAT SERPL: 15.5 (ref 7–25)
CALCIUM SPEC-SCNC: 9.8 MG/DL (ref 8.6–10.5)
CHLORIDE SERPL-SCNC: 103 MMOL/L (ref 98–107)
CHROMATIN AB SERPL-ACNC: <10 IU/ML (ref 0–14)
CO2 SERPL-SCNC: 21.3 MMOL/L (ref 22–29)
CREAT SERPL-MCNC: 1.03 MG/DL (ref 0.57–1)
DEPRECATED RDW RBC AUTO: 41.4 FL (ref 37–54)
EGFRCR SERPLBLD CKD-EPI 2021: 55.4 ML/MIN/1.73
ERYTHROCYTE [DISTWIDTH] IN BLOOD BY AUTOMATED COUNT: 12.9 % (ref 12.3–15.4)
ERYTHROCYTE [SEDIMENTATION RATE] IN BLOOD: 54 MM/HR (ref 0–30)
GLOBULIN UR ELPH-MCNC: 4 GM/DL
GLUCOSE SERPL-MCNC: 91 MG/DL (ref 65–99)
HCT VFR BLD AUTO: 41.8 % (ref 34–46.6)
HGB BLD-MCNC: 14.4 G/DL (ref 12–15.9)
MCH RBC QN AUTO: 30.3 PG (ref 26.6–33)
MCHC RBC AUTO-ENTMCNC: 34.4 G/DL (ref 31.5–35.7)
MCV RBC AUTO: 87.8 FL (ref 79–97)
PLATELET # BLD AUTO: 292 10*3/MM3 (ref 140–450)
PMV BLD AUTO: 10.8 FL (ref 6–12)
POTASSIUM SERPL-SCNC: 4 MMOL/L (ref 3.5–5.2)
PROT SERPL-MCNC: 7.7 G/DL (ref 6–8.5)
RBC # BLD AUTO: 4.76 10*6/MM3 (ref 3.77–5.28)
SODIUM SERPL-SCNC: 137 MMOL/L (ref 136–145)
VIT B12 BLD-MCNC: 355 PG/ML (ref 211–946)
WBC NRBC COR # BLD AUTO: 6.98 10*3/MM3 (ref 3.4–10.8)

## 2025-03-25 PROCEDURE — 85027 COMPLETE CBC AUTOMATED: CPT

## 2025-03-25 PROCEDURE — 82607 VITAMIN B-12: CPT

## 2025-03-25 PROCEDURE — 86235 NUCLEAR ANTIGEN ANTIBODY: CPT

## 2025-03-25 PROCEDURE — 92557 COMPREHENSIVE HEARING TEST: CPT | Performed by: AUDIOLOGIST

## 2025-03-25 PROCEDURE — 83921 ORGANIC ACID SINGLE QUANT: CPT

## 2025-03-25 PROCEDURE — 3078F DIAST BP <80 MM HG: CPT | Performed by: PSYCHIATRY & NEUROLOGY

## 2025-03-25 PROCEDURE — 84446 ASSAY OF VITAMIN E: CPT

## 2025-03-25 PROCEDURE — 86225 DNA ANTIBODY NATIVE: CPT

## 2025-03-25 PROCEDURE — 99205 OFFICE O/P NEW HI 60 MIN: CPT | Performed by: PSYCHIATRY & NEUROLOGY

## 2025-03-25 PROCEDURE — 83521 IG LIGHT CHAINS FREE EACH: CPT

## 2025-03-25 PROCEDURE — 84207 ASSAY OF VITAMIN B-6: CPT

## 2025-03-25 PROCEDURE — 86334 IMMUNOFIX E-PHORESIS SERUM: CPT

## 2025-03-25 PROCEDURE — 84165 PROTEIN E-PHORESIS SERUM: CPT

## 2025-03-25 PROCEDURE — 36415 COLL VENOUS BLD VENIPUNCTURE: CPT

## 2025-03-25 PROCEDURE — 84425 ASSAY OF VITAMIN B-1: CPT

## 2025-03-25 PROCEDURE — 3075F SYST BP GE 130 - 139MM HG: CPT | Performed by: PSYCHIATRY & NEUROLOGY

## 2025-03-25 PROCEDURE — 85652 RBC SED RATE AUTOMATED: CPT

## 2025-03-25 PROCEDURE — 86431 RHEUMATOID FACTOR QUANT: CPT

## 2025-03-25 PROCEDURE — 86038 ANTINUCLEAR ANTIBODIES: CPT

## 2025-03-25 PROCEDURE — 1160F RVW MEDS BY RX/DR IN RCRD: CPT | Performed by: PSYCHIATRY & NEUROLOGY

## 2025-03-25 PROCEDURE — 1159F MED LIST DOCD IN RCRD: CPT | Performed by: PSYCHIATRY & NEUROLOGY

## 2025-03-25 PROCEDURE — 80053 COMPREHEN METABOLIC PANEL: CPT

## 2025-03-25 PROCEDURE — 92567 TYMPANOMETRY: CPT | Performed by: AUDIOLOGIST

## 2025-03-25 PROCEDURE — 82784 ASSAY IGA/IGD/IGG/IGM EACH: CPT

## 2025-03-25 RX ORDER — SUCRALFATE 1 G/1
1 TABLET ORAL EVERY 12 HOURS SCHEDULED
COMMUNITY
Start: 2025-03-02

## 2025-03-25 NOTE — PROGRESS NOTES
AUDIOMETRIC EVALUATION      Name:  Bharti Nobles  :  1945  Age:  79 y.o.  Date of Evaluation:  2025       History:  Mrs. Nobles is seen today for a hearing evaluation due to dizziness.    Audiologic Information:  Concerns for Hearing: No  PETs: No  Other otologic surgical history: Yes  Aural Pressure/Fullness: No  Otalgia: No  Otorrhea: No  Tinnitus: Periodic  Dizziness: Yes  Noise Exposure: No  Family history of hearing loss: None  Head trauma requiring hospital stay: No  Chemotherapy: No  Other significant history: No    EVALUATION:    See audiogram    RESULTS:    Otoscopic Evaluation:  Right: Unremarkable  Left: Unremarkable    Tympanometry (226 Hz):  Right: Type A  Left: Type A    IMPRESSIONS:  Pure tone thresholds for the right ear shows mild to moderate sensorineural hearing loss.  Pure tone thresholds for the left ear shows a mild to moderate sensorineural hearing loss.  Patient was counseled with regard to the findings.    Amplification needs:  Patient could benefit from amplification if they feel increased communication difficulties.    RECOMMENDATIONS/PLAN:  Follow-up with the nurse practitioner  Discussed results and recommendations with patient. Questions were addressed and the patient was encouraged to contact our department should concerns arise.          Rupert Norris M.S, [facility]  Licensed Audiologist

## 2025-03-27 LAB
ALBUMIN SERPL ELPH-MCNC: 3.5 G/DL (ref 2.9–4.4)
ALBUMIN/GLOB SERPL: 1 {RATIO} (ref 0.7–1.7)
ALPHA1 GLOB SERPL ELPH-MCNC: 0.3 G/DL (ref 0–0.4)
ALPHA2 GLOB SERPL ELPH-MCNC: 0.8 G/DL (ref 0.4–1)
ANA SER QL IF: POSITIVE
ANA SPECKLED TITR SER: ABNORMAL {TITER}
B-GLOBULIN SERPL ELPH-MCNC: 1 G/DL (ref 0.7–1.3)
CENTROMERE B AB SER-ACNC: <0.2 AI (ref 0–0.9)
CHROMATIN AB SERPL-ACNC: <0.2 AI (ref 0–0.9)
DSDNA AB SER-ACNC: 3 IU/ML (ref 0–9)
ENA JO1 AB SER-ACNC: <0.2 AI (ref 0–0.9)
ENA RNP AB SER-ACNC: >8 AI (ref 0–0.9)
ENA SCL70 AB SER-ACNC: <0.2 AI (ref 0–0.9)
ENA SM AB SER-ACNC: <0.2 AI (ref 0–0.9)
ENA SS-A AB SER-ACNC: <0.2 AI (ref 0–0.9)
ENA SS-B AB SER-ACNC: <0.2 AI (ref 0–0.9)
GAMMA GLOB SERPL ELPH-MCNC: 1.5 G/DL (ref 0.4–1.8)
GLOBULIN SER-MCNC: 3.7 G/DL (ref 2.2–3.9)
IGA SERPL-MCNC: 233 MG/DL (ref 64–422)
IGG SERPL-MCNC: 1412 MG/DL (ref 586–1602)
IGM SERPL-MCNC: 106 MG/DL (ref 26–217)
INTERPRETATION SERPL IEP-IMP: ABNORMAL
KAPPA LC FREE SER-MCNC: 65.7 MG/L (ref 3.3–19.4)
KAPPA LC FREE/LAMBDA FREE SER: 1.52 {RATIO} (ref 0.26–1.65)
LABORATORY COMMENT REPORT: ABNORMAL
LABORATORY COMMENT REPORT: ABNORMAL
LAMBDA LC FREE SERPL-MCNC: 43.2 MG/L (ref 5.7–26.3)
Lab: ABNORMAL
Lab: ABNORMAL
M PROTEIN SERPL ELPH-MCNC: ABNORMAL G/DL
PROT SERPL-MCNC: 7.2 G/DL (ref 6–8.5)

## 2025-03-28 LAB — VIT B1 BLD-SCNC: 114.5 NMOL/L (ref 66.5–200)

## 2025-03-29 LAB — PYRIDOXAL PHOS SERPL-MCNC: 3.9 UG/L (ref 3.4–65.2)

## 2025-03-31 LAB — METHYLMALONATE SERPL-SCNC: 269 NMOL/L (ref 0–378)

## 2025-04-01 LAB
A-TOCOPHEROL VIT E SERPL-MCNC: 11 MG/L (ref 9–29)
GAMMA TOCOPHEROL SERPL-MCNC: 1.9 MG/L (ref 0.5–4.9)

## 2025-04-14 ENCOUNTER — TELEPHONE (OUTPATIENT)
Dept: NEUROLOGY | Facility: CLINIC | Age: 80
End: 2025-04-14
Payer: MEDICARE

## 2025-04-14 ENCOUNTER — HOSPITAL ENCOUNTER (OUTPATIENT)
Dept: ULTRASOUND IMAGING | Facility: HOSPITAL | Age: 80
Discharge: HOME | End: 2025-04-14
Payer: MEDICARE

## 2025-04-14 ENCOUNTER — HOSPITAL ENCOUNTER (OUTPATIENT)
Dept: BONE DENSITY | Facility: HOSPITAL | Age: 80
Discharge: HOME | End: 2025-04-14
Payer: MEDICARE

## 2025-04-14 DIAGNOSIS — E04.2 MULTIPLE THYROID NODULES: ICD-10-CM

## 2025-04-14 DIAGNOSIS — Z78.0 POSTMENOPAUSE: ICD-10-CM

## 2025-04-14 PROCEDURE — 76536 US EXAM OF HEAD AND NECK: CPT

## 2025-04-14 PROCEDURE — 77080 DXA BONE DENSITY AXIAL: CPT

## 2025-04-16 ENCOUNTER — RESULTS FOLLOW-UP (OUTPATIENT)
Age: 80
End: 2025-04-16
Payer: MEDICARE

## 2025-04-23 NOTE — PROGRESS NOTES
"Patient Name: Bharti Nobles   Visit Date: 04/24/2025   Patient ID: [id number]  Provider: MAUREEN Fields    Sex: female  Location: [facility]   YOB: 1945  Location Address: [address] [place] [address], [address] [place] [address],?[place]?[id number]    Primary Care Provider Reza Oakes MD  Location Phone: [phone]    Referring Provider: Reza Oakes MD        Chief Complaint  Dizziness (Pt is here today for dizziness. Pt states nothing showed up in the hearing test.  Pt has pressure in her head. States \" makes you feel like a drunk \" )    History of Present Illness  Bharti Nobles is a 79 y.o. female who presents to [facility] for Dizziness (Pt is here today for dizziness. Pt states nothing showed up in the hearing test.  Pt has pressure in her head. States \" makes you feel like a drunk \" )  Patient previously seen by MAUREEN Roche at [facility].  Note from last visit on 3/22/2016 those patient was last seen for uvulitis.  Patient has a history of BPPV in his been seen by vestibular rehab in the past.  Patient referred to ENT on 3/20/2025 by Dr. Oakes for unspecified hearing loss and dizziness.   Patient has a pertinent past medical history of CVA, depression, chronic kidney disease, thyroid nodules, vitamin D deficiency, hypertension, hyperlipidemia, and symptomatic bradycardia.  MRI brain with and without contrast from 2/7/2025: Chronic microvascular ischemic changes.  No acute infarct.  Possible stenosis of left posterior cerebral artery.  Scattered paranasal sinus mucosal thickening noted.  No significant internal carotid stenosis bilaterally.  Thyroid ultrasound from 4/14/2025: Few benign macrocalcifications. 9 mm colloid cyst (TI-RADS 1) in the isthmus. Few small mixed solid cystic isoechoic right thyroid   nodules measuring under 1 cm consistent with TI-RADS 2 nodules that have been present since 2016.  Audiogram from " "3/25/2025:   Procedure visit with Rupert Norris Au.D (03/25/2025)   SRT of 25 on the right and 20 on the left.  Word discrimination's 80% bilaterally.  Tympanograms type a bilaterally.  Bilateral SNHL mild loss at 250 through 2000 Hz sloping to moderate loss at higher frequencies.  4/24/25  Seen by vestibular rehab 3-4 years ago for BPPV.    Had echo, EKG, heart cath that was all negative.   Not having true rotational vertigo.  States when she stands, she has significant imbalance.  Symptoms can appear randomly while she is sitting as well.  Symptoms improve when she closes her eyes or lays down.  Patient had BPPV in the past but states symptoms are slightly different and significantly worse.    Started back in December with almost daily episodes.  Using scopolamine patches and meclizine with some relief.  Episodes are lasting days at this point.   Denies tinnitus, ear fullness, or changes is hearing.  Recently seen by neurology who was not concerned with MRI.  Neurology ordered nerve testing.   Sees neurology again in a few months.   Is due for checkup with ophthalmology.  History of neurofibromatosis.    Right beating nystag at 15 degree with field of gauze  VNG iF not BPPV?    Vital Signs:  Vitals:    04/24/25 1335   BP: 128/73   Pulse: 68   Temp: 97.9 °F (36.6 °C)   TempSrc: Infrared   SpO2: 99%   Weight: 71.7 kg (158 lb)   Height: 152.4 cm (60\")        Past Medical History:   Diagnosis Date    Bradycardia     Depression     Hyperlipidemia     Hypertension        Past Surgical History:   Procedure Laterality Date    APPENDECTOMY      BREAST AUGMENTATION      CARDIAC CATHETERIZATION      HYSTERECTOMY      MASTECTOMY      TONSILLECTOMY           Current Outpatient Medications:     amLODIPine (NORVASC) 5 MG tablet, Take 0.5 tablets by mouth Daily As Needed (Elevated blood pressure)., Disp: 30 tablet, Rfl: 1    aspirin 81 MG EC tablet, Take 1 tablet by mouth Take As Directed. 3 days a week, Disp: , Rfl:     " atorvastatin (LIPITOR) 20 MG tablet, Take 1 tablet by mouth Every Night., Disp: 90 tablet, Rfl: 1    benzonatate (TESSALON) 100 MG capsule, Take 2 capsules by mouth 3 (Three) Times a Day As Needed for Cough., Disp: 60 capsule, Rfl: 0    cephalexin (KEFLEX) 500 MG capsule, Take 1 capsule by mouth 3 (Three) Times a Day., Disp: 21 capsule, Rfl: 0    escitalopram (Lexapro) 10 MG tablet, Take 1 tablet by mouth Daily., Disp: 90 tablet, Rfl: 1    meclizine (ANTIVERT) 25 MG tablet, TAKE 1/2 TO 2 TABLETS BY MOUTH UP TO FOUR TIMES DAILY AS NEEDED FOR DIZZINESS, Disp: 60 tablet, Rfl: 2    Omeprazole 20 MG tablet delayed-release, Take 20 mg by mouth Daily., Disp: , Rfl:     pravastatin (PRAVACHOL) 40 MG tablet, TAKE 1 TABLET BY MOUTH EVERY NIGHT, Disp: 90 tablet, Rfl: 1    prochlorperazine (COMPAZINE) 5 MG tablet, 1 to 2 tablets every 6 hours as needed for severe dizziness.  Caution with driving., Disp: 20 tablet, Rfl: 0    Scopolamine 1 MG/3DAYS patch, Place 1 patch on the skin as directed by provider Every 72 (Seventy-Two) Hours As Needed (Persistent dizziness)., Disp: 10 each, Rfl: 2    sucralfate (CARAFATE) 1 g tablet, Take 1 tablet by mouth Every 12 (Twelve) Hours., Disp: , Rfl:     tiZANidine (ZANAFLEX) 4 MG tablet, TAKE 1 TABLET BY MOUTH AT NIGHT AS NEEDED FOR MUSCLE SPASMS, Disp: 90 tablet, Rfl: 1    valsartan (DIOVAN) 80 MG tablet, Take 0.5 tablets by mouth Daily As Needed (Hypertension)., Disp: 30 tablet, Rfl: 1     Allergies   Allergen Reactions    Adhesive Tape Rash       Social History     Tobacco Use    Smoking status: Never     Passive exposure: Never    Smokeless tobacco: Never   Vaping Use    Vaping status: Never Used   Substance Use Topics    Alcohol use: Yes     Comment: Occassionally, once a month    Drug use: No        Objective     Tobacco Use: Low Risk  (4/24/2025)    Patient History     Smoking Tobacco Use: Never     Smokeless Tobacco Use: Never     Passive Exposure: Never         Physical  Exam    Constitutional   Appearance  well developed, well-nourished, alert and in no acute distress, voice clear and strong    Head   Inspection  no deformities or lesions, atraumatic    Face   Inspection  no facial lesions; House-Brackmann I/VI bilaterally   Palpation  no TMJ crepitus nor  muscle tenderness bilaterally     Eyes/Vision   Visual Fields  extraocular movements are intact, no spontaneous or gaze-induced nystagmus  Conjunctivae  clear   Sclerae  clear   Pupils and Irises  pupils equal, round, and reactive to light.   Nystagmus  Right beating nystagmus with field of gaze at 15 degrees towards the right, otherwise negative  Romberg test negative  Patient having onset of feeling of off-balance and dizziness with just sitting without rapid head movement  Head impulse test and Ilene-Hallpike negative for nystagmus  Patient does state that she has increased symptoms  once sitting up    Ears, Nose, Mouth and Throat  Ears  External Ears  Auricles appearance within normal limits, no lesions present   Otoscopic Examination  tympanic membrane appearance within normal limits bilaterally without perforations, well-aerated middle ears without evidence of effusion  Hearing  intact to conversational voice both ears   Tunning fork testing    Rinne:  Cerrato:    Nose  External Nose  appearance normal   Intranasal Exam  mucosa within normal limits, vestibules normal, no intranasal lesions present, septum midline, sinuses non tender to percussion   Modified Boundary Test:    Oral Cavity  Oral Mucosa  oral mucosa normal without pallor or cyanosis   Stensen's and Warthin's ducts are productive and patent with clear saliva  Lips  lip appearance normal   Teeth  normal dentition for age   Gums  gums pink, non-swollen, no bleeding present   Tongue  tongue appearance normal; normal mobility   Palate  hard palate normal, soft palate appearance normal with symmetric mobility     Throat  Oropharynx  no inflammation or lesions  present  Tonsils  Bilateral tonsils unremarkable  Hypopharynx  appearance within normal limits   Larynx  voice normal     Neck  Inspection/Palpation  normal appearance, no masses or tenderness, trachea midline; thyroid size normal, nontender, no nodules or masses present on palpation     Lymphatic  Neck  no lymphadenopathy present   Supraclavicular Nodes  no lymphadenopathy present   Preauricular Nodes  no lymphadenopathy present     Respiratory  Respiratory Effort  breathing unlabored   Inspection of Chest  normal appearance, no retractions     Musculoskeletal   Cervical back: Normal range of motion and neck supple.      Skin and Subcutaneous Tissue  General Inspection  Regarding face and neck - there are no rashes present, no lesions present, and no areas of discoloration     Neurologic  Cranial Nerves  Alert and oriented x3  cranial nerves II-XII are grossly intact bilaterally   Gait and Station  normal gait, able to stand without diffculty    Psychiatric  Judgement and Insight  judgment and insight intact   Mood and Affect  mood normal, affect appropriate       RESULTS REVIEWED    I have reviewed the following information:   []  Previous Internal Note  [x]  Previous External Note:   [x]  Ordered Tests & Results:      Pathology:   Lab Results   Component Value Date    Microscopic Description  11/06/2019     1. Sections demonstrate polypoid colonic mucosa with villous and glandular architecture and low-grade adenomatous cytologic changes.  2.  Sections demonstrate polypoid colonic mucosa composed of enlarged glands with dilated and serrated lumens.  3.  Histologic sections show fragments of benign gastric mucosa with lamina propria edema and patchy minimal chronic inflammation.  No significant active inflammation is seen.  Immunohistochemical stain for Helicobacter pylori is negative.         TSH   Date Value Ref Range Status   07/05/2024 1.950 0.270 - 4.200 uIU/mL Final   03/08/2022 0.884 0.270 - 4.200 uIU/mL  Final     Free T4   Date Value Ref Range Status   07/05/2024 1.15 0.92 - 1.68 ng/dL Final   03/08/2022 0.95 0.93 - 1.70 ng/dL Final     Calcium   Date Value Ref Range Status   03/25/2025 9.8 8.6 - 10.5 mg/dL Final   09/26/2022 9.1 8.8 - 10.2 mg/dL Final     25 Hydroxy, Vitamin D   Date Value Ref Range Status   05/08/2021 30.6 >=30 ng/mL Final     Comment:     <=20 ng/mL.............Deficient  21-29 ng/mL...........Insufficient  >=30 ng/mL..........Sufficient       DEXA Bone Density Axial  Result Date: 4/16/2025  Impression: Osteoporosis. Report dictated by: Petty Arora  I have personally reviewed this case and agree with the findings above: Electronically Signed: Silver Vizcarra MD  4/16/2025 4:19 PM EDT  Workstation ID: [id number]    US Thyroid  Result Date: 4/15/2025  Impression: Sequelae of chronic thyroiditis with subcentimeter nodules requiring no follow-up per ACR guidelines. TI-RADS: TR2 - Not Suspicious. No follow up needed. Electronically Signed: Issa Herrera MD  4/15/2025 5:26 PM EDT  Workstation ID: [id number]    MRI Angiogram Head Without Contrast  Result Date: 2/7/2025  1.Findings compatible with chronic microvascular ischemic change. 2.No diffusion restriction is identified to suggest acute infarct. 3.No abnormal contrast enhancement is identified. 4.Somewhat diminutive left posterior cerebral artery is not well visualized beyond the P2 segment. This may be related to technical factors. Stenosis or age-indeterminate occlusion of this vessel cannot be excluded. No evidence of ischemia within the left PCA territory. If clinically indicated, further evaluation with head CTA may be considered. 5.Otherwise, no significant abnormality is identified within the large arteries of the head or neck. 6.No significant stenosis of the bilateral internal carotid arteries. Electronically Signed: Ted Rosales MD  2/7/2025 2:08 PM EST  Workstation ID: [id number]    MRI Angiogram Neck With & Without Contrast  Result  Date: 2/7/2025  1.Findings compatible with chronic microvascular ischemic change. 2.No diffusion restriction is identified to suggest acute infarct. 3.No abnormal contrast enhancement is identified. 4.Somewhat diminutive left posterior cerebral artery is not well visualized beyond the P2 segment. This may be related to technical factors. Stenosis or age-indeterminate occlusion of this vessel cannot be excluded. No evidence of ischemia within the left PCA territory. If clinically indicated, further evaluation with head CTA may be considered. 5.Otherwise, no significant abnormality is identified within the large arteries of the head or neck. 6.No significant stenosis of the bilateral internal carotid arteries. Electronically Signed: Ted Rosales MD  2/7/2025 2:08 PM EST  Workstation ID: [id number]    MRI Brain With & Without Contrast  Result Date: 2/7/2025  1.Findings compatible with chronic microvascular ischemic change. 2.No diffusion restriction is identified to suggest acute infarct. 3.No abnormal contrast enhancement is identified. 4.Somewhat diminutive left posterior cerebral artery is not well visualized beyond the P2 segment. This may be related to technical factors. Stenosis or age-indeterminate occlusion of this vessel cannot be excluded. No evidence of ischemia within the left PCA territory. If clinically indicated, further evaluation with head CTA may be considered. 5.Otherwise, no significant abnormality is identified within the large arteries of the head or neck. 6.No significant stenosis of the bilateral internal carotid arteries. Electronically Signed: Ted Rosales MD  2/7/2025 2:08 PM EST  Workstation ID: [id number]      I have discussed the interpretation of the above results with the patient.    Procedures          Assessment and Plan   Diagnoses and all orders for this visit:    1. Dizziness (Primary)  -     Videonystagmography; Future    2. Sensorineural hearing loss (SNHL) of right ear with  restricted hearing of left ear    3. Sensorineural hearing loss (SNHL) of left ear with restricted hearing of right ear    4. Imbalance  -     Videonystagmography; Future    5. Nystagmus  -     Videonystagmography; Future        (R42) Dizziness - Plan: Videonystagmography    (H90.A21) Sensorineural hearing loss (SNHL) of right ear with restricted hearing of left ear    (H90.A22) Sensorineural hearing loss (SNHL) of left ear with restricted hearing of right ear    (R26.89) Imbalance - Plan: Videonystagmography    (H55.00) Nystagmus - Plan: Videonystagmography     Bharti Nobles  reports that she has never smoked. She has never been exposed to tobacco smoke. She has never used smokeless tobacco.       Plan:  Patient Instructions   1.  Patient is having right beating nystagmus with field of gaze to the right.  Ilene-Hallpike and Fukuda testing negative.  Head impulse testing is negative.  I am not convinced that patient is having BPPV symptoms at this time.  2.  Patient had recent MRI with and without contrast of the brain that was negative for acoustic neuroma.  3.  Recommend follow-up with neurology and ophthalmology.  4.  We discussed that I could send her for vestibular rehab but patient are both not convinced that it is BPPV as she had before.  We discussed sending her for VNG testing and patient is agreeable.  Unfortunately this is scheduled out several months.  Recommend patient continue to follow-up with other providers in the meantime.  Also recommend patient continue scopolamine and meclizine if she feels it is at all helpful.  5.  I will see patient back in 2 months and once we have VNG results.      Follow Up   Return in about 2 months (around 6/24/2025), or with VNG results.  Patient was given instructions and counseling regarding her condition or for health maintenance advice. Please see specific information pulled into the AVS if appropriate.      All or a portion of this Note was dictated  utilizing Dragon Dictation.

## 2025-04-24 ENCOUNTER — HOSPITAL ENCOUNTER (OUTPATIENT)
Facility: HOSPITAL | Age: 80
Discharge: HOME | End: 2025-04-24
Admitting: PSYCHIATRY & NEUROLOGY
Payer: MEDICARE

## 2025-04-24 ENCOUNTER — OFFICE VISIT (OUTPATIENT)
Dept: OTOLARYNGOLOGY | Facility: CLINIC | Age: 80
End: 2025-04-24
Payer: MEDICARE

## 2025-04-24 VITALS
DIASTOLIC BLOOD PRESSURE: 73 MMHG | WEIGHT: 158 LBS | TEMPERATURE: 97.9 F | SYSTOLIC BLOOD PRESSURE: 128 MMHG | BODY MASS INDEX: 31.02 KG/M2 | HEART RATE: 68 BPM | OXYGEN SATURATION: 99 % | HEIGHT: 60 IN

## 2025-04-24 DIAGNOSIS — R42 DIZZINESS: Primary | ICD-10-CM

## 2025-04-24 DIAGNOSIS — G60.8: ICD-10-CM

## 2025-04-24 DIAGNOSIS — R26.89 IMBALANCE: ICD-10-CM

## 2025-04-24 DIAGNOSIS — H55.00 NYSTAGMUS: ICD-10-CM

## 2025-04-24 DIAGNOSIS — H90.A21 SENSORINEURAL HEARING LOSS (SNHL) OF RIGHT EAR WITH RESTRICTED HEARING OF LEFT EAR: ICD-10-CM

## 2025-04-24 DIAGNOSIS — H90.A22 SENSORINEURAL HEARING LOSS (SNHL) OF LEFT EAR WITH RESTRICTED HEARING OF RIGHT EAR: ICD-10-CM

## 2025-04-24 PROCEDURE — 3074F SYST BP LT 130 MM HG: CPT

## 2025-04-24 PROCEDURE — 95910 NRV CNDJ TEST 7-8 STUDIES: CPT

## 2025-04-24 PROCEDURE — 3078F DIAST BP <80 MM HG: CPT

## 2025-04-24 PROCEDURE — 99214 OFFICE O/P EST MOD 30 MIN: CPT

## 2025-04-24 PROCEDURE — 95886 MUSC TEST DONE W/N TEST COMP: CPT

## 2025-04-24 NOTE — PATIENT INSTRUCTIONS
1.  Patient is having right beating nystagmus with field of gaze to the right.  Ilene-Hallpike and Fukuda testing negative.  Head impulse testing is negative.  I am not convinced that patient is having BPPV symptoms at this time.  2.  Patient had recent MRI with and without contrast of the brain that was negative for acoustic neuroma.  3.  Recommend follow-up with neurology and ophthalmology.  4.  We discussed that I could send her for vestibular rehab but patient are both not convinced that it is BPPV as she had before.  We discussed sending her for VNG testing and patient is agreeable.  Unfortunately this is scheduled out several months.  Recommend patient continue to follow-up with other providers in the meantime.  Also recommend patient continue scopolamine and meclizine if she feels it is at all helpful.  5.  I will see patient back in 2 months and once we have VNG results.

## 2025-04-25 ENCOUNTER — OFFICE VISIT (OUTPATIENT)
Age: 80
End: 2025-04-25
Payer: MEDICARE

## 2025-04-25 VITALS
HEIGHT: 60 IN | WEIGHT: 159.6 LBS | HEART RATE: 56 BPM | DIASTOLIC BLOOD PRESSURE: 78 MMHG | SYSTOLIC BLOOD PRESSURE: 136 MMHG | OXYGEN SATURATION: 96 % | BODY MASS INDEX: 31.33 KG/M2

## 2025-04-25 DIAGNOSIS — M81.0 AGE-RELATED OSTEOPOROSIS WITHOUT CURRENT PATHOLOGICAL FRACTURE: Primary | ICD-10-CM

## 2025-04-25 DIAGNOSIS — Z87.898 HISTORY OF DIZZINESS: ICD-10-CM

## 2025-04-25 DIAGNOSIS — I10 ESSENTIAL HYPERTENSION: ICD-10-CM

## 2025-04-25 DIAGNOSIS — E04.2 MULTIPLE THYROID NODULES: ICD-10-CM

## 2025-04-25 RX ORDER — ALENDRONATE SODIUM 70 MG/1
70 TABLET ORAL
Qty: 13 TABLET | Refills: 3 | Status: SHIPPED | OUTPATIENT
Start: 2025-04-25

## 2025-04-25 NOTE — ASSESSMENT & PLAN NOTE
Patient is been seen by our ENT since her last important, I reviewed her note below:    1.  Patient is having right beating nystagmus with field of gaze to the right.  Salkum-Hallpike and Fukuda testing negative.  Head impulse testing is negative.  I am not convinced that patient is having BPPV symptoms at this time.  2.  Patient had recent MRI with and without contrast of the brain that was negative for acoustic neuroma.  3.  Recommend follow-up with neurology and ophthalmology.  4.  We discussed that I could send her for vestibular rehab but patient are both not convinced that it is BPPV as she had before.  We discussed sending her for VNG testing and patient is agreeable.  Unfortunately this is scheduled out several months.  Recommend patient continue to follow-up with other providers in the meantime.  Also recommend patient continue scopolamine and meclizine if she feels it is at all helpful.  5.  I will see patient back in 2 months and once we have VNG results.    Patient has been [facility].  Appreciate their expertise.

## 2025-04-25 NOTE — ASSESSMENT & PLAN NOTE
We reviewed the ultrasound at her 4/25 OV, discussed with patient that the very small nodules are stable, and radiology does not recommend any further follow-up.

## 2025-04-25 NOTE — ASSESSMENT & PLAN NOTE
Blood sugar very stable as of her 4/25 extra visit, she is on low-dose valsartan and moderate dose amlodipine, stable to continue same.

## 2025-04-25 NOTE — PROGRESS NOTES
"Chief Complaint  Imaging Only (To go over bone density.) and Dizziness (Pt states that she has good days and bad days, she saw ENT yesterday and they want her to have a Vestibular study they will scheduled. The Meclizine does help some. )    Subjective      Bharti Nobles presents to [facility]    History of present illness:  Patient is 79-year-old female with underlying hypertension, hyperlipidemia, history of gastric ulcer, and recurrent dizziness, who was seen in 4/24 as a new patient, and who is coming back in now 2/25 for routine 3 to 6-month follow-up.  We will review her med list, go over recent labs together, address care gaps and new concerns she may have.---> Patient is being seen a little early in 4/25 for follow-up of her bone density and to update on evaluation per consultants.    Review of Systems   Constitutional:  Negative for appetite change, fatigue and fever.   HENT:  Negative for congestion and ear pain.    Eyes:  Negative for blurred vision.   Respiratory:  Negative for cough, chest tightness, shortness of breath and wheezing.    Cardiovascular:  Negative for chest pain, palpitations and leg swelling.   Gastrointestinal:  Negative for abdominal pain.   Genitourinary:  Negative for difficulty urinating, dysuria and hematuria.   Musculoskeletal:  Negative for arthralgias and gait problem.   Skin:  Negative for skin lesions.   Neurological:  Positive for dizziness. Negative for syncope, memory problem and confusion.   Psychiatric/Behavioral:  Negative for self-injury and depressed mood.        Objective   Vital Signs:   /78   Pulse 56   Ht 152.4 cm (60\")   Wt 72.4 kg (159 lb 9.6 oz)   SpO2 96%   BMI 31.17 kg/m²         Physical Exam  Vitals and nursing note reviewed.   Constitutional:       General: She is not in acute distress.     Appearance: Normal appearance. She is not toxic-appearing.   HENT:      Head: Atraumatic.      Right Ear: " External ear normal.      Left Ear: External ear normal.      Nose: Nose normal.      Mouth/Throat:      Mouth: Mucous membranes are moist.   Eyes:      General:         Right eye: No discharge.         Left eye: No discharge.      Extraocular Movements: Extraocular movements intact.      Pupils: Pupils are equal, round, and reactive to light.   Neck:      Comments: No carotid bruits.  Cardiovascular:      Rate and Rhythm: Normal rate and regular rhythm.      Pulses: Normal pulses.      Heart sounds: Normal heart sounds. No murmur heard.     No gallop.      Comments: Normal heart tones, no ectopy, no S3.  Pulmonary:      Effort: Pulmonary effort is normal. No respiratory distress.      Breath sounds: No wheezing, rhonchi or rales.      Comments: No labored respirations.  Abdominal:      General: There is no distension.      Palpations: Abdomen is soft. There is no mass.      Tenderness: There is no abdominal tenderness. There is no guarding.   Musculoskeletal:         General: No swelling or tenderness.      Cervical back: No tenderness.      Right lower leg: No edema.      Left lower leg: No edema.      Comments: No edema.   Skin:     General: Skin is warm and dry.      Findings: No rash.   Neurological:      General: No focal deficit present.      Mental Status: She is alert and oriented to person, place, and time. Mental status is at baseline.      Motor: No weakness.      Gait: Gait normal.   Psychiatric:         Mood and Affect: Mood normal.         Thought Content: Thought content normal.          Result Review   The following data was reviewed by: Reza Oakes MD on 03/21/2024:  [x] Laboratory  [] Microbiology  [x] Radiology  [x] EKG/telemetry  [] Cardiology/Vascular  [] Pathology  [x] Old records             Assessment and Plan   Diagnoses and all orders for this visit:    1. Age-related osteoporosis without current pathological fracture (Primary)  Overview:  BMD 4/25:  Spine -1.4  Hip -2.7    Assessment &  Plan:  D/W results in detail.  D/W how to take med and possible side effects.        2. Multiple thyroid nodules  Overview:  Thyroid ultrasound 4/25:  Findings:  Right thyroid measures 4.4 x 1.8 x 1.7 cm and the left 5.4 x 2.1 x 1.5 cm. Heterogeneous background thyroid echotexture. Few benign macrocalcifications. 9 mm colloid cyst (TI-RADS 1) in the isthmus. Few small mixed solid cystic isoechoic right thyroid   nodules measuring under 1 cm consistent with TI-RADS 2 nodules that have been present since 2016.     IMPRESSION:  Impression:  Sequelae of chronic thyroiditis with subcentimeter nodules requiring no follow-up per ACR guidelines.       Thyroid ultrasound 3/22:  The right lobe measures 4.1 x 1.6 x 1.8 cm. The right thyroid lobe is   heterogeneous. Multiple 2 mm hypoechoic nodule in the lower pole is   present. The left lobe measures 4.8 x 2.1 x 1.5 cm. Left lobe is heterogeneous.   Partially calcified nodule measuring 5 x 6 mm present in the upper   pole . The isthmus also shows shows no interval changes. No enlarged cervical lymph nodes are identified.     Assessment & Plan:  We reviewed the ultrasound at her 4/25 OV, discussed with patient that the very small nodules are stable, and radiology does not recommend any further follow-up.      3. Essential hypertension  Assessment & Plan:  Blood sugar very stable as of her 4/25 extra visit, she is on low-dose valsartan and moderate dose amlodipine, stable to continue same.      4. History of dizziness  Overview:  Head CT 8/24:  CT scan of the head without IV contrast demonstrating mild atrophy and white matter changes.  2.  1 cm area of encephalomalacia in the head of the caudate nucleus on the left is consistent with old infarct. No acute intracranial abnormality is seen.    Head CT 11/22:  No acute intracranial abnormality is present. No evidence of acute cortical infarction, hemorrhage, mass or mass effect. No hydrocephalus or abnormal extra-axial fluid  collections are present. The posterior fossa is unremarkable.   The skull base and calvarium are intact. The included portions of the paranasal sinuses and mastoid air cells are clear.     Assessment & Plan:  Patient is been seen by our ENT since her last important, I reviewed her note below:    1.  Patient is having right beating nystagmus with field of gaze to the right.  Ilene-Hallpike and Fukuda testing negative.  Head impulse testing is negative.  I am not convinced that patient is having BPPV symptoms at this time.  2.  Patient had recent MRI with and without contrast of the brain that was negative for acoustic neuroma.  3.  Recommend follow-up with neurology and ophthalmology.  4.  We discussed that I could send her for vestibular rehab but patient are both not convinced that it is BPPV as she had before.  We discussed sending her for VNG testing and patient is agreeable.  Unfortunately this is scheduled out several months.  Recommend patient continue to follow-up with other providers in the meantime.  Also recommend patient continue scopolamine and meclizine if she feels it is at all helpful.  5.  I will see patient back in 2 months and once we have VNG results.    Patient has been referred for testing at [facility] audiology.  Appreciate their expertise.      Other orders  -     alendronate (Fosamax) 70 MG tablet; Take 1 tablet by mouth Every 7 (Seven) Days.  Dispense: 13 tablet; Refill: 3                BMI is >= 25 and <30. (Overweight) The following options were offered after discussion;: exercise counseling/recommendations and nutrition counseling/recommendations            Follow Up   Return for Next scheduled follow up.  Patient was given instructions and counseling regarding her condition or for health maintenance advice. Please see specific information pulled into the AVS if appropriate.     Total Time Spent:   minutes     This time includes time spent by me in the following activities: preparing for the  visit, reviewing extensive past medical history and tests, performing a medically appropriate examination and/or evaluation, counseling and educating the patient and/or caregivers, ordering medications, tests, or procedures, referring and/or communicating with other health care professionals and documenting information in the medical record all on this date of service.

## 2025-05-15 RX ORDER — PRAVASTATIN SODIUM 40 MG
40 TABLET ORAL NIGHTLY
Qty: 90 TABLET | Refills: 1 | Status: SHIPPED | OUTPATIENT
Start: 2025-05-15

## 2025-05-20 NOTE — PROGRESS NOTES
Chief Complaint  FU, Dizziness on standing and recurrent falls walking, neuropathy with labs    Patient or patient representative verbalized consent for the use of Ambient Listening during the visit with  Fran Martinez MD PhD for chart documentation. 6/25/2025  13:48 EDT    Subjective      History of Present Illness:  Bharti Nobles is a delightful 79 y.o. right handed female seen previously 3/25/2025 who presents to [facility] for FU for dizziness on standing with recurrent falls walking and neuropathy with labs done, with history of MDD, HTN, HLD, symptomatic bradycardia, chronic gastric ulcer, CKD 3, stress incontinence, vit D def, thyroid nodules, dizziness      From PCP note 12/31/2024:  Patient reported dizziness with unsteady walking which started yesterday. She has recurrent dizziness and meclizine and scopolamine patches, but these did not help. BP yesterday was low for her in 120's and today high but more normal at 168/72. Daughter reported she has fallen 3 times recently and pt stated her right leg thi but this is not related to dizziness. 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 BP is reasonable today and she is not orthostatic. Home BPs have not been low.   ER visit in 8/2024 suspected BPPV for feeling off balance on ambulation, but she is not vertiginous.  MRI/MRA brain ordered and refer to Neuro.     From Neuro note 3/25/2025:  Dizziness and neuropathy with recurrent falls.  Has had episodic dizziness bout for years.  Current dizziness bout ongoing since 12/2024, with this one more severe and longer-lasting than all previous episodes. The dizziness is described as vague, centered in her head, and associated with an unsteady walk, and sometimes worsened by standing up but improved when lying down or sitting for a while. Denied history of vertigo, migraines, or  headaches. Dizziness sensation may be associated with/exacerbated by widened pulse width with low blood pressure. In addition, suspect contribution to dizziness on walking from severe vib loss in feet with loss of proprioception. She also has a h/o childhood motion sickness, but denies s/s consistent with PPPD or chronic subjective dizziness. Chronic low back pain, periodic right leg buckling, and h/o left leg polio also may contribute to gait unsteadiness and falls.  An MRI and MRA of the brain conducted in August 2024 did not show significant findings. She is advised to continue monitoring her symptoms and avoid sudden position changes to prevent worsening dizziness.   Patient can count potatoes on standing and walk forward only once dizziness has improved.  Lab screen sent for neuropathy noted on exam, and EMG RLE for right leg buckling and severe stocking sensory loss.    History of Present Illness  The patient is a 79-year-old female referred to neurology as a follow-up for dizziness.    She reports an improvement in her condition, with the onset of symptoms occurring at the beginning of the month. She experienced a period of five months where her symptoms were severe, but she has noticed a significant improvement since the start of this month. Occasionally, brief episodes of dizziness occur, but these are transient and do not persist. Prior to the onset of her symptoms, she recalls being highly active, maintaining a healthy weight, and feeling as energetic as she did in her twenties. She is currently in the process of regaining her energy levels.    Past medical history includes decreased sensation in her feet, which has been attributed to neuropathy. Recent lab results indicated an elevated GERARDO panel, suggesting potential autoimmune involvement, and elevated kappa and lambda light chains, indicating an overactive antibody system. She has been referred to rheumatology for further evaluation.    INTERVAL: Since  the last visit, she has been referred to rheumatology due to elevated GERARDO panel and kappa and lambda light chains. She reports improvement in her dizziness symptoms and is regaining her energy levels.    SOCIAL HISTORY:    Education Level: [employer]        All available pertinent Labs and Imaging personally reviewed, including:    Imaging:    MRI/MRA Brain and Neck With & Without Contrast 2/7/2025 for persistent dizziness:  Impression  1.Mild chronic microvascular ischemic change.  2.No diffusion restriction is identified to suggest acute infarct.  3.No abnormal contrast enhancement is identified.  4.Somewhat diminutive left posterior cerebral artery is not well visualized beyond the P2 segment. This may be related to technical factors. Stenosis or age-indeterminate occlusion of this vessel cannot be excluded. No evidence of ischemia within the left PCA territory. If clinically indicated, further evaluation with head CTA may be considered.  5.Otherwise, no significant abnormality is identified within the large arteries of the head or neck.  6.No significant stenosis of the bilateral internal carotid arteries.         Labs:  Lab Results   Component Value Date    WBC 6.06 05/22/2025    HGB 13.3 05/22/2025    HCT 41.2 05/22/2025    MCV 93.0 05/22/2025     05/22/2025     Lab Results   Component Value Date    GLUCOSE 89 05/22/2025    BUN 19 05/22/2025    CREATININE 1.17 (H) 05/22/2025     05/22/2025    K 4.3 05/22/2025     05/22/2025    CALCIUM 8.8 05/22/2025    PROTEINTOT 6.9 05/22/2025    ALBUMIN 3.8 05/22/2025    ALT 12 05/22/2025    AST 17 05/22/2025    ALKPHOS 107 05/22/2025    BILITOT 0.9 05/22/2025    GLOB 3.1 05/22/2025    AGRATIO 1.2 05/22/2025    BCR 16.2 05/22/2025    ANIONGAP 10.0 05/22/2025    EGFR 47.6 (L) 05/22/2025     Lab Results   Component Value Date    HGBA1C 5.10 07/05/2024     Lab Results   Component Value Date    TSH 3.400 05/22/2025     Lab Results   Component Value Date  "   DABUVKNM10 243 05/22/2025     Lab Results   Component Value Date    FOLATE 12.30 05/22/2025     Lab Results   Component Value Date    CHOL 164 05/22/2025    CHLPL 275 (H) 08/29/2023    TRIG 117 05/22/2025    HDL 54 05/22/2025    LDL 89 05/22/2025     Other Labs:  MMA: 269  GERARDO: POS, 1:320 speckled pattern, with RNP antibodies >8.0 (referred to Rheum)  RF: <10  SPEP: nml pattern, no M spike  PAULETTE: nml pattern  Lite chains: kappa 65.7 (H), Lambda 43.2 (H), ratio 1.52  B1: 114  B6: 3.9  E: 11.0        Objective   Vital Signs:   /57 (BP Location: Right arm, Patient Position: Sitting, Cuff Size: Adult)   Pulse 60   Ht 152.4 cm (60\")   Wt 74.4 kg (164 lb)   SpO2 97%   BMI 32.03 kg/m²       Focused NEUROLOGICAL Exam:  MS: A+O x 3, language spontaneous, fluent with logical content, no word finding, no repeating or perseveration, reported own and regarded as excellent historian, normal judgement and insight, mood euthymic;   CN: PERRLA, full excursions in all cardinal directions with smooth pursuits throughout without saccadic breaks or nystagmus noted; horizontal and vertical saccades symmetric and on target. Cover test reveals no phoria. Visual fields full to confrontation. V1-III Light touch symm and intact; symm jaw clench masseter and temporalis bulk and tone, medial and lateral pterygoids intact. Symm forehead crease and grimace. Hearing grossly symm and intact to finger rub. Uvula and soft palate midline rise on gag. Sternocleidomastoids and traps symm and 5/5. Tongue demonstrates no furrowing and extends midline and into left and right.  MOTOR: no atrophy/drift, normal tone, strength 5/5 except 4/5 diffusely left leg (polio, festus in place), no adventitial movements or tremor noted  SENSORY: Severe vib loss in toes. Romberg - POS eyes open  COORD: SANDI/FTN/HTS with good rhythm, speed, and amplitude. No ataxia noted.  GAIT: Native wide based left limp gait with decreased stride, nml symm jose armswing, nml " start/stop/turn. Toe and heel walk without difficulty. Tandem walk without difficulty.  DTR's: deferred              Diagnoses and all orders for this visit:    1. Dizziness (Primary)    2. Neuropathy    3. Persistent postural-perceptual dizziness         79 y.o. right handed female seen previously 3/25/2025 who presents to [facility] for FU for dizziness on standing with recurrent falls walking and neuropathy with labs and EMG.     From PCP note 12/31/2024:  Patient reported dizziness with unsteady walking which started yesterday. She has recurrent dizziness and meclizine and scopolamine patches, but these did not help. BP yesterday was low for her in 120's and today high but more normal at 168/72. Daughter reported she has fallen 3 times recently and pt stated her right leg thi but this is not related to dizziness. 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 BP is reasonable today and she is not orthostatic. Home BPs have not been low.   ER visit in 8/2024 suspected BPPV for feeling off balance on ambulation, but she is not vertiginous.  MRI/MRA brain ordered and refer to Neuro.     From Neuro note 3/25/2025:  Dizziness and neuropathy with recurrent falls.  Current dizziness bout ongoing since 12/2024, more severe and longer-lasting than all previous episodes. The dizziness is described as vague, centered in her head, and associated with an unsteady walk, and sometimes worsened by standing up but improved when lying down or sitting for a while. Denied history of vertigo, migraines, or headaches. Dizziness sensation may be associated with/exacerbated by widened pulse width with low blood pressure. In addition, suspect contribution to dizziness on walking from severe vib loss in feet with loss of proprioception. She also has a h/o childhood motion sickness, but denies s/s consistent with PPPD or  chronic subjective dizziness. Chronic low back pain, periodic right leg buckling, and h/o left leg polio also may contribute to gait unsteadiness and falls.  An MRI and MRA of the brain conducted in August 2024 did not show significant findings. She is advised to continue monitoring her symptoms and avoid sudden position changes to prevent worsening dizziness.   Patient can count potatoes on standing and walk forward only once dizziness has improved.  Lab screen sent for neuropathy noted on exam, and EMG RLE for right leg buckling and severe stocking sensory loss.      Assessment & Plan  1. Dizziness.  Her dizziness is likely multifactorial, potentially due to lightheadedness and dehydration, which can be exacerbated by dietary habits and age-related changes in appetite. The neuropathy she experiences may also contribute to her dizziness. The mismatch between her perception and the environment could be indicative of persistent postural perceptual dizziness (PPPD). She is advised to maintain a healthy lifestyle, including regular exercise and a balanced diet. A heart-healthy diet, rich in protein and low in carbohydrates, is recommended. She should avoid foods high in carbohydrates such as rolls, bread, desserts, and candy, and focus on consuming more protein and some fats. The Mediterranean diet or the carnivore diet are suggested as potential options. She is encouraged to remain mentally and physically active. An EMG has been ordered BLEs, but it is up to her discretion whether to proceed with it. She is advised to be cautious about her foot sensation loss to prevent falls. If she experiences dizziness, she should sit down and try to identify any potential triggers.    2. Neuropathy.  She has significant loss of sensation in her feet, which is unlikely to improve. This condition can contribute to her dizziness and increase her risk of falls. An EMG has been ordered to confirm the involvement of both legs. She is  advised to be cautious and avoid situations that could lead to falls. A referral to rheumatology has been made due to her elevated GERARDO panel and the potential for autoimmune diseases to exacerbate neuropathy.    3. Elevated GERARDO panel.  Her GERARDO panel is elevated, which could indicate an autoimmune disorder such as Sjogren's or lupus. This elevation is concerning and warrants further investigation by a rheumatologist. A referral to rheumatology has been made.    4. Elevated kappa and lambda light chains.  Her kappa and lambda light chains are elevated, suggesting that her antibody system is overactive. This could be related to an immune response or her existing kidney disease. Further evaluation by a rheumatologist is recommended.    Follow-up  The patient will follow up in 3 months.          Follow Up  Return in about 3 months (around 9/25/2025) for dizziness, EMG results and referred to Rheum.    17 minutes were spent caring for Bharti Nobles on this date of service. This time spent by me includes preparing for the visit, reviewing tests, obtaining/reviewing separately obtained history, performing medically appropriate exam/evaluation, counseling/educating the patient/family/caregiver, ordering medications/tests/procedures, referring/communicating with other health care professionals, documenting information in the medical record, independently interpreting results and communicating that with the patient/family/caregiver and/or care coordination.     Patient was given instructions and counseling regarding patient's condition or for health maintenance advice. Please see specific information pulled into the AVS if appropriate.

## 2025-05-22 ENCOUNTER — LAB (OUTPATIENT)
Dept: LAB | Facility: HOSPITAL | Age: 80
End: 2025-05-22
Payer: MEDICARE

## 2025-05-22 DIAGNOSIS — E53.8 VITAMIN B12 DEFICIENCY: ICD-10-CM

## 2025-05-22 DIAGNOSIS — N18.31 STAGE 3A CHRONIC KIDNEY DISEASE: ICD-10-CM

## 2025-05-22 DIAGNOSIS — E04.2 MULTIPLE THYROID NODULES: ICD-10-CM

## 2025-05-22 DIAGNOSIS — E78.2 MIXED HYPERLIPIDEMIA: ICD-10-CM

## 2025-05-22 DIAGNOSIS — I10 ESSENTIAL HYPERTENSION: ICD-10-CM

## 2025-05-22 LAB
ALBUMIN SERPL-MCNC: 3.8 G/DL (ref 3.5–5.2)
ALBUMIN/GLOB SERPL: 1.2 G/DL
ALP SERPL-CCNC: 107 U/L (ref 39–117)
ALT SERPL W P-5'-P-CCNC: 12 U/L (ref 1–33)
ANION GAP SERPL CALCULATED.3IONS-SCNC: 10 MMOL/L (ref 5–15)
AST SERPL-CCNC: 17 U/L (ref 1–32)
BASOPHILS # BLD AUTO: 0.06 10*3/MM3 (ref 0–0.2)
BASOPHILS NFR BLD AUTO: 1 % (ref 0–1.5)
BILIRUB SERPL-MCNC: 0.9 MG/DL (ref 0–1.2)
BUN SERPL-MCNC: 19 MG/DL (ref 8–23)
BUN/CREAT SERPL: 16.2 (ref 7–25)
CALCIUM SPEC-SCNC: 8.8 MG/DL (ref 8.6–10.5)
CHLORIDE SERPL-SCNC: 106 MMOL/L (ref 98–107)
CHOLEST SERPL-MCNC: 164 MG/DL (ref 0–200)
CK SERPL-CCNC: 23 U/L (ref 20–180)
CO2 SERPL-SCNC: 24 MMOL/L (ref 22–29)
CREAT SERPL-MCNC: 1.17 MG/DL (ref 0.57–1)
DEPRECATED RDW RBC AUTO: 44.9 FL (ref 37–54)
EGFRCR SERPLBLD CKD-EPI 2021: 47.6 ML/MIN/1.73
EOSINOPHIL # BLD AUTO: 0.2 10*3/MM3 (ref 0–0.4)
EOSINOPHIL NFR BLD AUTO: 3.3 % (ref 0.3–6.2)
ERYTHROCYTE [DISTWIDTH] IN BLOOD BY AUTOMATED COUNT: 13.1 % (ref 12.3–15.4)
FOLATE SERPL-MCNC: 12.3 NG/ML (ref 4.78–24.2)
GLOBULIN UR ELPH-MCNC: 3.1 GM/DL
GLUCOSE SERPL-MCNC: 89 MG/DL (ref 65–99)
HCT VFR BLD AUTO: 41.2 % (ref 34–46.6)
HDLC SERPL-MCNC: 54 MG/DL (ref 40–60)
HGB BLD-MCNC: 13.3 G/DL (ref 12–15.9)
IMM GRANULOCYTES # BLD AUTO: 0.03 10*3/MM3 (ref 0–0.05)
IMM GRANULOCYTES NFR BLD AUTO: 0.5 % (ref 0–0.5)
LDLC SERPL CALC-MCNC: 89 MG/DL (ref 0–100)
LDLC/HDLC SERPL: 1.6 {RATIO}
LYMPHOCYTES # BLD AUTO: 1.35 10*3/MM3 (ref 0.7–3.1)
LYMPHOCYTES NFR BLD AUTO: 22.3 % (ref 19.6–45.3)
MCH RBC QN AUTO: 30 PG (ref 26.6–33)
MCHC RBC AUTO-ENTMCNC: 32.3 G/DL (ref 31.5–35.7)
MCV RBC AUTO: 93 FL (ref 79–97)
MONOCYTES # BLD AUTO: 0.62 10*3/MM3 (ref 0.1–0.9)
MONOCYTES NFR BLD AUTO: 10.2 % (ref 5–12)
NEUTROPHILS NFR BLD AUTO: 3.8 10*3/MM3 (ref 1.7–7)
NEUTROPHILS NFR BLD AUTO: 62.7 % (ref 42.7–76)
NRBC BLD AUTO-RTO: 0 /100 WBC (ref 0–0.2)
PLATELET # BLD AUTO: 232 10*3/MM3 (ref 140–450)
PMV BLD AUTO: 11.5 FL (ref 6–12)
POTASSIUM SERPL-SCNC: 4.3 MMOL/L (ref 3.5–5.2)
PROT SERPL-MCNC: 6.9 G/DL (ref 6–8.5)
RBC # BLD AUTO: 4.43 10*6/MM3 (ref 3.77–5.28)
SODIUM SERPL-SCNC: 140 MMOL/L (ref 136–145)
T4 FREE SERPL-MCNC: 1.07 NG/DL (ref 0.92–1.68)
TRIGL SERPL-MCNC: 117 MG/DL (ref 0–150)
TSH SERPL DL<=0.05 MIU/L-ACNC: 3.4 UIU/ML (ref 0.27–4.2)
VIT B12 BLD-MCNC: 243 PG/ML (ref 211–946)
VLDLC SERPL-MCNC: 21 MG/DL (ref 5–40)
WBC NRBC COR # BLD AUTO: 6.06 10*3/MM3 (ref 3.4–10.8)

## 2025-05-22 PROCEDURE — 36415 COLL VENOUS BLD VENIPUNCTURE: CPT

## 2025-05-22 PROCEDURE — 84443 ASSAY THYROID STIM HORMONE: CPT

## 2025-05-22 PROCEDURE — 84439 ASSAY OF FREE THYROXINE: CPT

## 2025-05-22 PROCEDURE — 82746 ASSAY OF FOLIC ACID SERUM: CPT

## 2025-05-22 PROCEDURE — 82550 ASSAY OF CK (CPK): CPT

## 2025-05-22 PROCEDURE — 82607 VITAMIN B-12: CPT

## 2025-05-22 PROCEDURE — 80061 LIPID PANEL: CPT

## 2025-05-22 PROCEDURE — 85025 COMPLETE CBC W/AUTO DIFF WBC: CPT

## 2025-05-22 PROCEDURE — 80053 COMPREHEN METABOLIC PANEL: CPT

## 2025-05-29 ENCOUNTER — OFFICE VISIT (OUTPATIENT)
Age: 80
End: 2025-05-29
Payer: MEDICARE

## 2025-05-29 VITALS
BODY MASS INDEX: 31.22 KG/M2 | SYSTOLIC BLOOD PRESSURE: 132 MMHG | DIASTOLIC BLOOD PRESSURE: 82 MMHG | HEIGHT: 60 IN | WEIGHT: 159 LBS | OXYGEN SATURATION: 95 % | HEART RATE: 58 BPM

## 2025-05-29 DIAGNOSIS — I10 ESSENTIAL HYPERTENSION: ICD-10-CM

## 2025-05-29 DIAGNOSIS — R42 NONSPECIFIC DIZZINESS: Primary | ICD-10-CM

## 2025-05-29 DIAGNOSIS — E78.2 MIXED HYPERLIPIDEMIA: ICD-10-CM

## 2025-05-29 DIAGNOSIS — R91.1 RIGHT UPPER LOBE PULMONARY NODULE: ICD-10-CM

## 2025-05-29 DIAGNOSIS — E04.2 MULTIPLE THYROID NODULES: ICD-10-CM

## 2025-05-29 DIAGNOSIS — R91.1 NODULE OF UPPER LOBE OF RIGHT LUNG: ICD-10-CM

## 2025-05-29 RX ORDER — AMLODIPINE BESYLATE 5 MG/1
2.5 TABLET ORAL DAILY PRN
Qty: 30 TABLET | Refills: 1 | Status: SHIPPED | OUTPATIENT
Start: 2025-05-29

## 2025-05-29 NOTE — PATIENT INSTRUCTIONS
Maintain hydration  Change positions slowly  Monitor b/p goal is to be less than 140/90   Restart the amlodipine 2.5 mg and continue on diovan 40 each am  See Neuro and ENT as scheduled  Monitor o2 sat if less than 93 rest if consistently low and soa has to be seen  Will repeat chest ct to recheck right upper lobe nodule

## 2025-05-29 NOTE — PROGRESS NOTES
"Chief Complaint  Dizziness (Has been dealing with this for 5 years and worse in the last 5 months sees a PT for therapy and she noticed that her oxygen level was dropping. Running from 90-96 /)    Subjective      Bharti Nobles is a 79 y.o. female who presents to [facility] comes in today thinking she had a routine fu but actually had her dates confused and it was earlier in the week, She ask to be worked in to discuss her dizziness B/P and  oxygen levels.      She reports that she has been dealing with the \"light headedness\"  for about 5 years and it is getting worse in the last 5-6 months..She reports she has stopped the meclizine as was instructed it would  not benefit her, She has good and bad days. She had been doing well for the last 3 weeks however 3 days ago she  had significant flare and has had lightheadedness each day since. Symptoms relieved by laying down. She went to therapy yesterday and they told her that she didn't have to come anymore as wasn't benefiting her,  but with evaluation triage at PT was noted that her oxygen was decreasing to 90. She was instructed to buy oximeter and monitor her levels and if below 93 needed to be seen. She is concerned as when she checks it at home frequently less than 93, this am was 91 but when rested came back up to 96. Occ she does have SOA, but denies cough congestion wheezing or fever or chills.  She is former smoker.  She also reports she needs a repeat CT of her chest, that she had a nodule noted 2-3 years ago during an admission at [facility] and was told to have it repeated.  She has since then had cxr's but not a CT, chest xray nothing acute.     CTA Chest 5/2021    IMPRESSION:   Impression:   1. No CT angiographic evidence of pulmonary enema circulation aortic   pathology.   2. Cardiomegaly.   3. Chronic lung changes without parenchymal infiltrates.   4. Right upper lobe 5 mm pulmonary nodule, follow-up " "recommended   5. Cholelithiasis.   6. Atrophic left kidney.   7.. Prominent adrenal glands, hyperplasia versus adenomatous change       She has a history of smoking two packs a day until 1988   History of Present Illness  She also reports her b/p was elevated at therapy and she has been monitoring her blood pressure  been consistently 180s/60s.  Her heart rate typically ranges from 50 to 60. She is taking Diovan 40mg each day but  has not been taking amlodipine 2.5 mg. Reports that prior b/p  was in the 130s and 140s over the 50s and 60s, so she had discontinued the amlodipine.  She  is now monitoring her blood pressure three times daily and reports consistently high. Instructed to restart the amlodipine.     Today in the office 132/82, she denies chest pain or palpitations      ROS; No fever, no chills, no weight change.    HEENT: No sore throat, earache, or congestion.   No neck pain.   Cardiac: No chest pain. No palpitations. Reports elevated B/P readings  Lungs: Occ shortness of breath no cough wheezing or congestion  GI: No nausea, no vomiting, no diarrhea, no constipation, no anorexia.   : No dysuria, frequency, or urgency. No hematuria.   Musculoskeletal: No joint pain or swelling or edema.   Skin: No rash or itching  Neuro: frequent dizziness and light headedness as noted per HPI, no weakness          SOCIAL HISTORY  The patient smoked about 2 packs a day for years but quit in 1988.     Oxygen level at therapy was  91%        Objective   Vital Signs:   Vitals:    05/29/25 1350   BP: 132/82   Pulse: 58   SpO2: 95%   Weight: 72.1 kg (159 lb)   Height: 152.4 cm (60\")     Body mass index is 31.05 kg/m².    Wt Readings from Last 3 Encounters:   05/29/25 72.1 kg (159 lb)   04/25/25 72.4 kg (159 lb 9.6 oz)   04/24/25 71.7 kg (158 lb)     BP Readings from Last 3 Encounters:   05/29/25 132/82   04/25/25 136/78   04/24/25 128/73       Health Maintenance   Topic Date Due    TDAP/TD VACCINES (1 - Tdap) Never done    " RSV Vaccine - Adults (1 - 1-dose 75+ series) Never done    Pneumococcal Vaccine 50+ (2 of 2 - PCV) 08/15/2020    ANNUAL WELLNESS VISIT  07/31/2025    LIPID PANEL  05/22/2026    DXA SCAN  04/14/2027    HEPATITIS C SCREENING  Completed    ZOSTER VACCINE  Completed    COVID-19 Vaccine  Discontinued    INFLUENZA VACCINE  Discontinued    COLORECTAL CANCER SCREENING  Discontinued       Past Medical History:   Diagnosis Date    Bradycardia     Depression     Hyperlipidemia     Hypertension      Past Surgical History:   Procedure Laterality Date    APPENDECTOMY      BREAST AUGMENTATION      CARDIAC CATHETERIZATION      HYSTERECTOMY      MASTECTOMY      TONSILLECTOMY       History reviewed. No pertinent family history.  Social History     Socioeconomic History    Marital status:    Tobacco Use    Smoking status: Never     Passive exposure: Never    Smokeless tobacco: Never   Vaping Use    Vaping status: Never Used   Substance and Sexual Activity    Alcohol use: Yes     Comment: Occassionally, once a month    Drug use: No    Sexual activity: Defer       Current Outpatient Medications   Medication Instructions    alendronate (FOSAMAX) 70 mg, Oral, Every 7 Days    amLODIPine (NORVASC) 2.5 mg, Oral, Daily PRN    aspirin 81 mg, Take As Directed    atorvastatin (LIPITOR) 20 mg, Oral, Nightly    escitalopram (LEXAPRO) 10 mg, Oral, Daily    Omeprazole 20 MG tablet delayed-release 1 tablet, Daily    prochlorperazine (COMPAZINE) 5 MG tablet 1 to 2 tablets every 6 hours as needed for severe dizziness.  Caution with driving.    sucralfate (CARAFATE) 1 g tablet 1 tablet, Every 12 Hours Scheduled    tiZANidine (ZANAFLEX) 4 mg, Oral, Nightly PRN    valsartan (DIOVAN) 40 mg, Daily PRN       Medications Discontinued During This Encounter   Medication Reason    meclizine (ANTIVERT) 25 MG tablet *Therapy completed    pravastatin (PRAVACHOL) 40 MG tablet *Therapy completed    Scopolamine 1 MG/3DAYS patch *Therapy completed    meclizine  (ANTIVERT) 25 MG tablet *Therapy completed    amLODIPine (NORVASC) 5 MG tablet Reorder        Physical Exam  Constitutional:       Appearance: Normal appearance.   HENT:      Head: Normocephalic.   Eyes:      Pupils: Pupils are equal, round, and reactive to light.   Cardiovascular:      Rate and Rhythm: Normal rate and regular rhythm.   Pulmonary:      Effort: Pulmonary effort is normal.      Breath sounds: Normal breath sounds.   Abdominal:      Palpations: Abdomen is soft.   Musculoskeletal:         General: Normal range of motion.      Cervical back: Normal range of motion.   Neurological:      General: No focal deficit present.      Mental Status: She is alert and oriented to person, place, and time.   Psychiatric:         Mood and Affect: Mood normal.         Behavior: Behavior normal.         Thought Content: Thought content normal.          Physical Exam         Result Review :  The following data was reviewed by: MAUREEN Curry on 05/29/2025:    Labs  Lab on 05/22/2025   Component Date Value Ref Range Status    Glucose 05/22/2025 89  65 - 99 mg/dL Final    BUN 05/22/2025 19  8 - 23 mg/dL Final    Creatinine 05/22/2025 1.17 (H)  0.57 - 1.00 mg/dL Final    Sodium 05/22/2025 140  136 - 145 mmol/L Final    Potassium 05/22/2025 4.3  3.5 - 5.2 mmol/L Final    Chloride 05/22/2025 106  98 - 107 mmol/L Final    CO2 05/22/2025 24.0  22.0 - 29.0 mmol/L Final    Calcium 05/22/2025 8.8  8.6 - 10.5 mg/dL Final    Total Protein 05/22/2025 6.9  6.0 - 8.5 g/dL Final    Albumin 05/22/2025 3.8  3.5 - 5.2 g/dL Final    ALT (SGPT) 05/22/2025 12  1 - 33 U/L Final    AST (SGOT) 05/22/2025 17  1 - 32 U/L Final    Alkaline Phosphatase 05/22/2025 107  39 - 117 U/L Final    Total Bilirubin 05/22/2025 0.9  0.0 - 1.2 mg/dL Final    Globulin 05/22/2025 3.1  gm/dL Final    A/G Ratio 05/22/2025 1.2  g/dL Final    BUN/Creatinine Ratio 05/22/2025 16.2  7.0 - 25.0 Final    Anion Gap 05/22/2025 10.0  5.0 - 15.0 mmol/L Final    eGFR  05/22/2025 47.6 (L)  >60.0 mL/min/1.73 Final    Total Cholesterol 05/22/2025 164  0 - 200 mg/dL Final    Triglycerides 05/22/2025 117  0 - 150 mg/dL Final    HDL Cholesterol 05/22/2025 54  40 - 60 mg/dL Final    LDL Cholesterol  05/22/2025 89  0 - 100 mg/dL Final    VLDL Cholesterol 05/22/2025 21  5 - 40 mg/dL Final    LDL/HDL Ratio 05/22/2025 1.60   Final    Vitamin B-12 05/22/2025 243  211 - 946 pg/mL Final    Folate 05/22/2025 12.30  4.78 - 24.20 ng/mL Final    TSH 05/22/2025 3.400  0.270 - 4.200 uIU/mL Final    Free T4 05/22/2025 1.07  0.92 - 1.68 ng/dL Final    Creatine Kinase 05/22/2025 23  20 - 180 U/L Final    WBC 05/22/2025 6.06  3.40 - 10.80 10*3/mm3 Final    RBC 05/22/2025 4.43  3.77 - 5.28 10*6/mm3 Final    Hemoglobin 05/22/2025 13.3  12.0 - 15.9 g/dL Final    Hematocrit 05/22/2025 41.2  34.0 - 46.6 % Final    MCV 05/22/2025 93.0  79.0 - 97.0 fL Final    MCH 05/22/2025 30.0  26.6 - 33.0 pg Final    MCHC 05/22/2025 32.3  31.5 - 35.7 g/dL Final    RDW 05/22/2025 13.1  12.3 - 15.4 % Final    RDW-SD 05/22/2025 44.9  37.0 - 54.0 fl Final    MPV 05/22/2025 11.5  6.0 - 12.0 fL Final    Platelets 05/22/2025 232  140 - 450 10*3/mm3 Final    Neutrophil % 05/22/2025 62.7  42.7 - 76.0 % Final    Lymphocyte % 05/22/2025 22.3  19.6 - 45.3 % Final    Monocyte % 05/22/2025 10.2  5.0 - 12.0 % Final    Eosinophil % 05/22/2025 3.3  0.3 - 6.2 % Final    Basophil % 05/22/2025 1.0  0.0 - 1.5 % Final    Immature Grans % 05/22/2025 0.5  0.0 - 0.5 % Final    Neutrophils, Absolute 05/22/2025 3.80  1.70 - 7.00 10*3/mm3 Final    Lymphocytes, Absolute 05/22/2025 1.35  0.70 - 3.10 10*3/mm3 Final    Monocytes, Absolute 05/22/2025 0.62  0.10 - 0.90 10*3/mm3 Final    Eosinophils, Absolute 05/22/2025 0.20  0.00 - 0.40 10*3/mm3 Final    Basophils, Absolute 05/22/2025 0.06  0.00 - 0.20 10*3/mm3 Final    Immature Grans, Absolute 05/22/2025 0.03  0.00 - 0.05 10*3/mm3 Final    nRBC 05/22/2025 0.0  0.0 - 0.2 /100 WBC Final       Imaging  DEXA  Bone Density Axial  Result Date: 4/16/2025  Impression: Osteoporosis. Report dictated by: Petty Ulysses  I have personally reviewed this case and agree with the findings above: Electronically Signed: Silver Vizcarra MD  4/16/2025 4:19 PM EDT  Workstation ID: [id number]    US Thyroid  Result Date: 4/15/2025  Impression: Sequelae of chronic thyroiditis with subcentimeter nodules requiring no follow-up per ACR guidelines. TI-RADS: TR2 - Not Suspicious. No follow up needed. Electronically Signed: Issa Herrera MD  4/15/2025 5:26 PM EDT  Workstation ID: [id number]    MRI Angiogram Head Without Contrast  Result Date: 2/7/2025  1.Findings compatible with chronic microvascular ischemic change. 2.No diffusion restriction is identified to suggest acute infarct. 3.No abnormal contrast enhancement is identified. 4.Somewhat diminutive left posterior cerebral artery is not well visualized beyond the P2 segment. This may be related to technical factors. Stenosis or age-indeterminate occlusion of this vessel cannot be excluded. No evidence of ischemia within the left PCA territory. If clinically indicated, further evaluation with head CTA may be considered. 5.Otherwise, no significant abnormality is identified within the large arteries of the head or neck. 6.No significant stenosis of the bilateral internal carotid arteries. Electronically Signed: Ted Rosales MD  2/7/2025 2:08 PM EST  Workstation ID: [id number]    MRI Angiogram Neck With & Without Contrast  Result Date: 2/7/2025  1.Findings compatible with chronic microvascular ischemic change. 2.No diffusion restriction is identified to suggest acute infarct. 3.No abnormal contrast enhancement is identified. 4.Somewhat diminutive left posterior cerebral artery is not well visualized beyond the P2 segment. This may be related to technical factors. Stenosis or age-indeterminate occlusion of this vessel cannot be excluded. No evidence of ischemia within the left PCA territory. If  clinically indicated, further evaluation with head CTA may be considered. 5.Otherwise, no significant abnormality is identified within the large arteries of the head or neck. 6.No significant stenosis of the bilateral internal carotid arteries. Electronically Signed: Ted Rosales MD  2/7/2025 2:08 PM EST  Workstation ID: [id number]    MRI Brain With & Without Contrast  Result Date: 2/7/2025  1.Findings compatible with chronic microvascular ischemic change. 2.No diffusion restriction is identified to suggest acute infarct. 3.No abnormal contrast enhancement is identified. 4.Somewhat diminutive left posterior cerebral artery is not well visualized beyond the P2 segment. This may be related to technical factors. Stenosis or age-indeterminate occlusion of this vessel cannot be excluded. No evidence of ischemia within the left PCA territory. If clinically indicated, further evaluation with head CTA may be considered. 5.Otherwise, no significant abnormality is identified within the large arteries of the head or neck. 6.No significant stenosis of the bilateral internal carotid arteries. Electronically Signed: Ted Rosales MD  2/7/2025 2:08 PM EST  Workstation ID: [id number]      Results  Labs   - Blood work: Normal    Imaging   - CT of chest: 2021, No pulmonary edema, chronic lung changes in right upper lobe with a 5 mm nodule, follow-up recommended, some gallstones       Procedures       ASSESSMENT & PLAN  Diagnoses and all orders for this visit:    1. Nonspecific dizziness (Primary)    2. Nodule of upper lobe of right lung  -     CT Chest Low Dose Follow Up Without Contrast; Future    3. Multiple thyroid nodules    4. Essential hypertension    5. Mixed hyperlipidemia    6. Right upper lobe pulmonary nodule    Other orders  -     amLODIPine (NORVASC) 5 MG tablet; Take 0.5 tablets by mouth Daily As Needed (Elevated blood pressure).  Dispense: 30 tablet; Refill: 1         Assessment & Plan  1. Lightheadedness.  - Reports  experiencing lightheadedness/ dizziness.  - Previous evaluations by neurology and ENT specialists have not provided a conclusive diagnosis.  - Physical therapy has  not been beneficial in managing symptoms.  - Advised to maintain adequate hydration and change positions slowly to help manage symptoms. Will follow up with neurology and ENT as scheduled.    2. Hypertension.  - Blood pressure readings have been elevated, with recent measurements at home being 180's//60-70  - Currently on Diovan 40 mg daily but has not been taking the amlodipine 2.5  regularly.  - Advised to resume taking amlodipine 2.5 mg (half tablet) daily.  - Should continue monitoring blood pressure and keep log bring to fu visit. Goal is to maintain blood pressure below 140/90 mmHg.    3. Lung nodule. Decreased o2 sat in 90's   - Previous CT scan from 2021 showed a 5 mm nodule in the right upper lobe of the lung. Unable to see repeat chest ct in records and pt states [facility] since [facility] and established care here.   - Repeat low-dose CT scan of the chest will be ordered to monitor any changes in the nodule.  - Advised to monitor oxygen levels, aiming for readings above 93%, preferably around 97%. If oxygen level drops below 93% and stays with c/o of soa , further evaluation may be needed.    Follow-up  - Will follow up with Dr. Cueva at the end of June or sooner if condition worsens.                  FOLLOW UP  No follow-ups on file.  Patient was given instructions and counseling regarding her condition or for health maintenance advice. Please see specific information pulled into the AVS if appropriate.     Patient or patient representative verbalized consent for the use of Ambient Listening during the visit with  MAUREEN Curry for chart documentation. 5/29/2025  20:00 EDT    MAUREEN Curry  05/29/25  20:19 EDT

## 2025-06-11 ENCOUNTER — HOSPITAL ENCOUNTER (OUTPATIENT)
Dept: CT IMAGING | Facility: HOSPITAL | Age: 80
Discharge: HOME | End: 2025-06-11
Admitting: NURSE PRACTITIONER
Payer: MEDICARE

## 2025-06-11 DIAGNOSIS — R91.1 NODULE OF UPPER LOBE OF RIGHT LUNG: ICD-10-CM

## 2025-06-11 PROCEDURE — 71250 CT THORAX DX C-: CPT

## 2025-06-17 RX ORDER — ESCITALOPRAM OXALATE 10 MG/1
10 TABLET ORAL DAILY
Qty: 90 TABLET | Refills: 1 | Status: SHIPPED | OUTPATIENT
Start: 2025-06-17

## 2025-06-19 RX ORDER — SCOPOLAMINE 1 MG/3D
PATCH, EXTENDED RELEASE TRANSDERMAL
Qty: 10 PATCH | OUTPATIENT
Start: 2025-06-19

## 2025-06-24 ENCOUNTER — OFFICE VISIT (OUTPATIENT)
Age: 80
End: 2025-06-24
Payer: MEDICARE

## 2025-06-24 VITALS
HEIGHT: 60 IN | DIASTOLIC BLOOD PRESSURE: 78 MMHG | WEIGHT: 164.2 LBS | HEART RATE: 56 BPM | OXYGEN SATURATION: 97 % | SYSTOLIC BLOOD PRESSURE: 120 MMHG | BODY MASS INDEX: 32.24 KG/M2

## 2025-06-24 DIAGNOSIS — E55.9 VITAMIN D DEFICIENCY: ICD-10-CM

## 2025-06-24 DIAGNOSIS — E53.8 VITAMIN B12 DEFICIENCY: ICD-10-CM

## 2025-06-24 DIAGNOSIS — N18.31 STAGE 3A CHRONIC KIDNEY DISEASE: ICD-10-CM

## 2025-06-24 DIAGNOSIS — E78.2 MIXED HYPERLIPIDEMIA: ICD-10-CM

## 2025-06-24 DIAGNOSIS — R76.8 ANTINUCLEAR ANTIBODY (ANA) POSITIVE: ICD-10-CM

## 2025-06-24 DIAGNOSIS — L57.0 ACTINIC KERATOSIS OF LEFT CHEEK: ICD-10-CM

## 2025-06-24 DIAGNOSIS — I10 ESSENTIAL HYPERTENSION: ICD-10-CM

## 2025-06-24 DIAGNOSIS — Z00.00 MEDICARE ANNUAL WELLNESS VISIT, SUBSEQUENT: Primary | ICD-10-CM

## 2025-06-24 PROBLEM — R91.1 PULMONARY NODULE LESS THAN 1 CM IN DIAMETER WITH LOW RISK FOR MALIGNANT NEOPLASM: Status: ACTIVE | Noted: 2025-06-24

## 2025-06-24 PROBLEM — Z91.89 PULMONARY NODULE LESS THAN 1 CM IN DIAMETER WITH LOW RISK FOR MALIGNANT NEOPLASM: Status: ACTIVE | Noted: 2025-06-24

## 2025-06-24 PROCEDURE — G2211 COMPLEX E/M VISIT ADD ON: HCPCS | Performed by: INTERNAL MEDICINE

## 2025-06-24 PROCEDURE — 3078F DIAST BP <80 MM HG: CPT | Performed by: INTERNAL MEDICINE

## 2025-06-24 PROCEDURE — 99214 OFFICE O/P EST MOD 30 MIN: CPT | Performed by: INTERNAL MEDICINE

## 2025-06-24 PROCEDURE — 1160F RVW MEDS BY RX/DR IN RCRD: CPT | Performed by: INTERNAL MEDICINE

## 2025-06-24 PROCEDURE — 1170F FXNL STATUS ASSESSED: CPT | Performed by: INTERNAL MEDICINE

## 2025-06-24 PROCEDURE — 1126F AMNT PAIN NOTED NONE PRSNT: CPT | Performed by: INTERNAL MEDICINE

## 2025-06-24 PROCEDURE — G0439 PPPS, SUBSEQ VISIT: HCPCS | Performed by: INTERNAL MEDICINE

## 2025-06-24 PROCEDURE — 3074F SYST BP LT 130 MM HG: CPT | Performed by: INTERNAL MEDICINE

## 2025-06-24 PROCEDURE — 1159F MED LIST DOCD IN RCRD: CPT | Performed by: INTERNAL MEDICINE

## 2025-06-24 NOTE — PATIENT INSTRUCTIONS
1.  Look for Vitamin B12 over-the-counter, and take 1000 mcg every Monday, Wednesday, and Friday.    2.  You should be on Vitamin D3 2000 international units once a day.

## 2025-06-24 NOTE — PROGRESS NOTES
Subjective   The ABCs of the Annual Wellness Visit  Medicare Wellness Visit      Bharti Nobles is a 79 y.o. patient who presents for a Medicare Wellness Visit.    The following portions of the patient's history were reviewed and   updated as appropriate: allergies, current medications, past family history, past medical history, past social history, past surgical history, and problem list.    Compared to one year ago, the patient's physical   health is the same.  Compared to one year ago, the patient's mental   health is the same.    Recent Hospitalizations:  She was not admitted to the hospital during the last year.     Current Medical Providers:  Patient Care Team:  Reza Oakes MD as PCP - General (Internal Medicine)  Bradford Mendes MD as Consulting Physician (Otolaryngology)  Fran Martinez MD PhD as Consulting Physician (Neurology)  Iliana Saldana APRN as Nurse Practitioner (Nurse Practitioner)    Outpatient Medications Prior to Visit   Medication Sig Dispense Refill    alendronate (Fosamax) 70 MG tablet Take 1 tablet by mouth Every 7 (Seven) Days. 13 tablet 3    amLODIPine (NORVASC) 5 MG tablet Take 0.5 tablets by mouth Daily As Needed (Elevated blood pressure). 30 tablet 1    aspirin 81 MG EC tablet Take 1 tablet by mouth Take As Directed. 3 days a week      atorvastatin (LIPITOR) 20 MG tablet Take 1 tablet by mouth Every Night. 90 tablet 1    escitalopram (LEXAPRO) 10 MG tablet TAKE 1 TABLET BY MOUTH DAILY 90 tablet 1    Omeprazole 20 MG tablet delayed-release Take 20 mg by mouth Daily.      prochlorperazine (COMPAZINE) 5 MG tablet 1 to 2 tablets every 6 hours as needed for severe dizziness.  Caution with driving. 20 tablet 0    sucralfate (CARAFATE) 1 g tablet Take 1 tablet by mouth Every 12 (Twelve) Hours.      tiZANidine (ZANAFLEX) 4 MG tablet TAKE 1 TABLET BY MOUTH AT NIGHT AS NEEDED FOR MUSCLE SPASMS 90 tablet 1    valsartan (DIOVAN) 80 MG tablet Take 0.5 tablets by  "mouth Daily As Needed (Hypertension). 30 tablet 1     No facility-administered medications prior to visit.     No opioid medication identified on active medication list. I have reviewed chart for other potential  high risk medication/s and harmful drug interactions in the elderly.      Aspirin is on active medication list. Aspirin use is indicated based on review of current medical condition/s. Pros and cons of this therapy have been discussed today. Benefits of this medication outweigh potential harm.  Patient has been encouraged to continue taking this medication.  .      Patient Active Problem List   Diagnosis    Well adult exam    Symptomatic bradycardia    Essential hypertension    Chronic gastric ulcer requiring drug therapy    Female stress incontinence    Mixed hyperlipidemia    Medicare annual wellness visit, subsequent    Vitamin D deficiency    History of dizziness    Stage 3a chronic kidney disease    Multiple thyroid nodules    Cerebrovascular accident (CVA)    Recurrent major depression in partial remission    Age-related osteoporosis without current pathological fracture     Advance Care Planning Advance Directive is on file.  ACP discussion was held with the patient during this visit. Patient has an advance directive in EMR which is still valid.             Objective   Vitals:    06/24/25 1305   BP: 120/78   Pulse: 56   SpO2: 97%   Weight: 74.5 kg (164 lb 3.2 oz)   Height: 152.4 cm (60\")   PainSc: 0-No pain       Estimated body mass index is 32.07 kg/m² as calculated from the following:    Height as of this encounter: 152.4 cm (60\").    Weight as of this encounter: 74.5 kg (164 lb 3.2 oz).                Does the patient have evidence of cognitive impairment? No  Lab Results   Component Value Date    TRIG 117 05/22/2025    HDL 54 05/22/2025    LDL 89 05/22/2025    VLDL 21 05/22/2025                                                                                               Health  Risk " Assessment    Smoking Status:  Social History     Tobacco Use   Smoking Status Never    Passive exposure: Never   Smokeless Tobacco Never     Alcohol Consumption:  Social History     Substance and Sexual Activity   Alcohol Use Yes    Comment: Occassionally, once a month       Fall Risk Screen  ZACKADI Fall Risk Assessment was completed, and patient is at HIGH risk for falls. Assessment completed on:2025    Depression Screening   Little interest or pleasure in doing things? Not at all   Feeling down, depressed, or hopeless? Not at all   PHQ-2 Total Score 0      Health Habits and Functional and Cognitive Screenin/24/2025     1:04 PM   Functional & Cognitive Status   Do you have difficulty preparing food and eating? No   Do you have difficulty bathing yourself, getting dressed or grooming yourself? No   Do you have difficulty using the toilet? No   Do you have difficulty moving around from place to place? No   Do you have trouble with steps or getting out of a bed or a chair? No   Current Diet Well Balanced Diet   Dental Exam Up to date   Eye Exam Up to date   Exercise (times per week) 0 times per week   Current Exercises Include No Regular Exercise   Do you need help using the phone?  No   Are you deaf or do you have serious difficulty hearing?  No   Do you need help to go to places out of walking distance? No   Do you need help shopping? No   Do you need help preparing meals?  No   Do you need help with housework?  No   Do you need help with laundry? No   Do you need help taking your medications? No   Do you need help managing money? No   Do you ever drive or ride in a car without wearing a seat belt? No   Have you felt unusual fatigue (could be tiredness), stress, anger or loneliness in the last month? No   Who do you live with? Child   If you need help, do you have trouble finding someone available to you? No   Have you been bothered in the last four weeks by sexual problems? No   Do you have  difficulty concentrating, remembering or making decisions? No           Age-appropriate Screening Schedule:  Refer to the list below for future screening recommendations based on patient's age, sex and/or medical conditions. Orders for these recommended tests are listed in the plan section. The patient has been provided with a written plan.    Health Maintenance List  Health Maintenance   Topic Date Due    TDAP/TD VACCINES (1 - Tdap) Never done    RSV Vaccine - Adults (1 - 1-dose 75+ series) Never done    Pneumococcal Vaccine 50+ (2 of 2 - PCV) 08/15/2020    LIPID PANEL  05/22/2026    ANNUAL WELLNESS VISIT  06/24/2026    DXA SCAN  04/14/2027    HEPATITIS C SCREENING  Completed    ZOSTER VACCINE  Completed    COVID-19 Vaccine  Discontinued    INFLUENZA VACCINE  Discontinued    COLORECTAL CANCER SCREENING  Discontinued                                                                                                                                                CMS Preventative Services Quick Reference  Risk Factors Identified During Encounter  Fall Risk-High or Moderate: Discussed Fall Prevention in the home    The above risks/problems have been discussed with the patient.  Pertinent information has been shared with the patient in the After Visit Summary.  An After Visit Summary and PPPS were made available to the patient.    Follow Up:   Next Medicare Wellness visit to be scheduled in 1 year.     Assessment & Plan  Medicare annual wellness visit, subsequent  AWV completed 6/25.  Patient remains active and independent.  She does have underlying chronic dizziness, followed by ENT, but will periodically have controlled falls, but no significant trauma at least in the past year.  No overnight hospitalizations past year.  Her living will is already on file at the hospital.              Follow Up:   No follow-ups on file.

## 2025-06-24 NOTE — ASSESSMENT & PLAN NOTE
Patient is LDL is down sharply from 150 to 89 as of her 6/25 OV.  That is after we switched her from pravastatin to low to moderate dose atorvastatin.  Certainly better ballpark, continue same.

## 2025-06-24 NOTE — ASSESSMENT & PLAN NOTE
Blood pressure very stable as of her 6/25 OVextra visit, she is on low-dose valsartan and moderate dose amlodipine, stable to continue same.

## 2025-06-24 NOTE — ASSESSMENT & PLAN NOTE
PIPEV completed 6/25.  Patient remains active and independent.  She does have underlying chronic dizziness, followed by ENT, but will periodically have controlled falls, but no significant trauma at least in the past year.  No overnight hospitalizations past year.  Her living will is already on file at the hospital.

## 2025-06-24 NOTE — ASSESSMENT & PLAN NOTE
GFR is fairly stable at 48 as of her 6/25 OV, electrolytes are fine, there is no acidosis.   She is on low dose ARB, so continue same.

## 2025-06-24 NOTE — PROGRESS NOTES
"Chief Complaint  Hypertension, Follow-up (Pt had labs in May and she seen Laura as well, she has been keeping track of her BP and HR. ), and Medicare Wellness-subsequent    Subjective      Bharti Nobles presents to [facility]    History of present illness:  Patient is 79-year-old female with underlying hypertension, hyperlipidemia, history of gastric ulcer, and recurrent dizziness, who was seen in 4/24 as a new patient, and who is coming back in now 6/25 for routine 3 to 6-month follow-up.  We will review her med list, go over recent labs together, address care gaps and new concerns she may have.    Review of Systems   Constitutional:  Negative for appetite change, fatigue and fever.   HENT:  Negative for congestion and ear pain.    Eyes:  Negative for blurred vision.   Respiratory:  Negative for cough, chest tightness, shortness of breath and wheezing.    Cardiovascular:  Negative for chest pain, palpitations and leg swelling.   Gastrointestinal:  Negative for abdominal pain.   Genitourinary:  Negative for difficulty urinating, dysuria and hematuria.   Musculoskeletal:  Negative for arthralgias and gait problem.   Skin:  Negative for skin lesions.   Neurological:  Negative for syncope, memory problem and confusion.   Psychiatric/Behavioral:  Negative for self-injury and depressed mood.        Objective   Vital Signs:   /78   Pulse 56   Ht 152.4 cm (60\")   Wt 74.5 kg (164 lb 3.2 oz)   SpO2 97%   BMI 32.07 kg/m²         Physical Exam  Vitals and nursing note reviewed.   Constitutional:       General: She is not in acute distress.     Appearance: Normal appearance. She is not toxic-appearing.   HENT:      Head: Atraumatic.      Right Ear: External ear normal.      Left Ear: External ear normal.      Nose: Nose normal.      Mouth/Throat:      Mouth: Mucous membranes are moist.   Eyes:      General:         Right eye: No discharge.         Left eye: No discharge.     "  Extraocular Movements: Extraocular movements intact.      Pupils: Pupils are equal, round, and reactive to light.   Neck:      Comments: No carotid bruits.  Cardiovascular:      Rate and Rhythm: Normal rate and regular rhythm.      Pulses: Normal pulses.      Heart sounds: Normal heart sounds. No murmur heard.     No gallop.      Comments: Normal heart tones, no ectopy, no S3.  Pulmonary:      Effort: Pulmonary effort is normal. No respiratory distress.      Breath sounds: No wheezing, rhonchi or rales.      Comments: No labored respirations.  Abdominal:      General: There is no distension.      Palpations: Abdomen is soft. There is no mass.      Tenderness: There is no abdominal tenderness. There is no guarding.   Musculoskeletal:         General: No swelling or tenderness.      Cervical back: No tenderness.      Right lower leg: No edema.      Left lower leg: No edema.      Comments: No edema.   Skin:     General: Skin is warm and dry.      Findings: No rash.   Neurological:      General: No focal deficit present.      Mental Status: She is alert and oriented to person, place, and time. Mental status is at baseline.      Motor: No weakness.      Gait: Gait normal.   Psychiatric:         Mood and Affect: Mood normal.         Thought Content: Thought content normal.          Result Review   The following data was reviewed by: Reza Oakes MD on 03/21/2024:  [x] Laboratory  [] Microbiology  [x] Radiology  [x] EKG/telemetry  [] Cardiology/Vascular  [] Pathology  [x] Old records             Assessment and Plan   Diagnoses and all orders for this visit:    1. Medicare annual wellness visit, subsequent (Primary)  Assessment & Plan:  AWV completed 6/25.  Patient remains active and independent.  She does have underlying chronic dizziness, followed by ENT, but will periodically have controlled falls, but no significant trauma at least in the past year.  No overnight hospitalizations past year.  Her living will is already  on file at the hospital.             2. Stage 3a chronic kidney disease  Overview:  Renal ultrasound 7/21:  L renal atrophy    The right kidney measures 4.8 x 5.2 benign 0.7 cm. The right kidney is   normal in size, shape, contour and position. A small 8 mm cortical   cyst is present. The cortical thickness is normal, with preservation   of the corticomedullary differentiation. The central echo complex is   compact with no evidence for hydronephrosis. No nephrolithiasis or   abnormal perinephric fluid collections are seen. No hydroureter.   The left kidney measures 2.5 x 2.9 x 6.8 cm. The left kidney is   atrophic in size. The cortical thickness is thin measuring 3 to 6 mm,   with preservation of the corticomedullary differentiation. The central   echo complex is compact with no evidence for hydronephrosis. No   nephrolithiasis or abnormal perinephric fluid collections are seen. No   hydroureter.   Scanning through the pelvis reveals the bladder to be mildly distended   with anechoic urine. The wall thickness and contour are normal. There   is no surrounding ascites.     Assessment & Plan:  GFR is fairly stable at 48 as of her 6/25 OV, electrolytes are fine, there is no acidosis.   She is on low dose ARB, so continue same.    Orders:  -     Comprehensive Metabolic Panel; Future    3. Essential hypertension  Assessment & Plan:  Blood pressure very stable as of her 6/25 OVextra visit, she is on low-dose valsartan and moderate dose amlodipine, stable to continue same.      4. Actinic keratosis of left cheek  -     Ambulatory Referral to Dermatology; Future    5. Mixed hyperlipidemia  Assessment & Plan:  Patient is LDL is down sharply from 150 to 89 as of her 6/25 OV.  That is after we switched her from pravastatin to low to moderate dose atorvastatin.  Certainly better ballpark, continue same.      6. Vitamin B12 deficiency  -     Vitamin B12 anemia; Future  -     Folate anemia; Future    7. Vitamin D deficiency  -      Vitamin D,25-Hydroxy; Future    8. Antinuclear antibody (GERARDO) positive  Assessment & Plan:  Labs form 3/25 reviewed at her 6/25 OV and she has f/u with Neuro this regards tomorrow actually.                    BMI is >= 25 and <30. (Overweight) The following options were offered after discussion;: exercise counseling/recommendations and nutrition counseling/recommendations            Follow Up   Return in about 4 months (around 10/24/2025).  Patient was given instructions and counseling regarding her condition or for health maintenance advice. Please see specific information pulled into the AVS if appropriate.     Total Time Spent:   minutes     This time includes time spent by me in the following activities: preparing for the visit, reviewing extensive past medical history and tests, performing a medically appropriate examination and/or evaluation, counseling and educating the patient and/or caregivers, ordering medications, tests, or procedures, referring and/or communicating with other health care professionals and documenting information in the medical record all on this date of service.

## 2025-06-25 ENCOUNTER — OFFICE VISIT (OUTPATIENT)
Dept: NEUROLOGY | Facility: CLINIC | Age: 80
End: 2025-06-25
Payer: MEDICARE

## 2025-06-25 VITALS
OXYGEN SATURATION: 97 % | BODY MASS INDEX: 32.2 KG/M2 | SYSTOLIC BLOOD PRESSURE: 153 MMHG | HEIGHT: 60 IN | DIASTOLIC BLOOD PRESSURE: 57 MMHG | HEART RATE: 60 BPM | WEIGHT: 164 LBS

## 2025-06-25 DIAGNOSIS — H81.8X9 PERSISTENT POSTURAL-PERCEPTUAL DIZZINESS: ICD-10-CM

## 2025-06-25 DIAGNOSIS — R42 DIZZINESS: Primary | ICD-10-CM

## 2025-06-25 DIAGNOSIS — G62.9 NEUROPATHY: ICD-10-CM

## 2025-06-26 NOTE — PROGRESS NOTES
"Patient Name: Bharti Nobles   Visit Date: 06/27/2025   Patient ID: [id number]  Provider: MAUREEN Fields    Sex: female  Location: [facility]   YOB: 1945  Location Address: [address] [place] [address], [address] [place] [address],?[place]?[id number]    Primary Care Provider Reza Oakes MD  Location Phone: [phone]    Referring Provider: No ref. provider found        Chief Complaint  2 M F/UP (VNG SCHEDULED FOR 8/19/25) and Dizziness    History of Present Illness  Bharti Nobles is a 79 y.o. female who presents to [facility] for 2 M F/UP (VNG SCHEDULED FOR 8/19/25) and Dizziness  Pt has pressure in her head. States \" makes you feel like a drunk \" )  Patient previously seen by MAUREEN Roche at [facility].  Note from last visit on 3/22/2016 shows patient was last seen for uvulitis.  Patient has a history of BPPV and has been seen by vestibular rehab in the past.  Patient referred to ENT on 3/20/2025 by Dr. Oakes for unspecified hearing loss and dizziness.   Patient has a pertinent past medical history of CVA, depression, chronic kidney disease, thyroid nodules, vitamin D deficiency, hypertension, hyperlipidemia, and symptomatic bradycardia.  MRI brain with and without contrast from 2/7/2025: Chronic microvascular ischemic changes.  No acute infarct.  Possible stenosis of left posterior cerebral artery.  Scattered paranasal sinus mucosal thickening noted.  No significant internal carotid stenosis bilaterally.  Thyroid ultrasound from 4/14/2025: Few benign macrocalcifications. 9 mm colloid cyst (TI-RADS 1) in the isthmus. Few small mixed solid cystic isoechoic right thyroid   nodules measuring under 1 cm consistent with TI-RADS 2 nodules that have been present since 2016.  Audiogram from 3/25/2025:   Procedure visit with Rupert Norris Au.D (03/25/2025)   SRT of 25 on the right and 20 on the left.  Word discrimination's 80% bilaterally.  " Tympanograms type a bilaterally.  Bilateral SNHL mild loss at 250 through 2000 Hz sloping to moderate loss at higher frequencies.  4/24/25  Seen by vestibular rehab 3-4 years ago for BPPV.    Had echo, EKG, heart cath that was all negative.   Not having true rotational vertigo.  States when she stands, she has significant imbalance.  Symptoms can appear randomly while she is sitting as well.  Symptoms improve when she closes her eyes or lays down.  Patient had BPPV in the past but states symptoms are slightly different and significantly worse.    Started back in December with almost daily episodes.  Using scopolamine patches and meclizine with some relief.  Episodes are lasting days at this point.   Denies tinnitus, ear fullness, or changes is hearing.  Recently seen by neurology who was not concerned with MRI.  Neurology ordered nerve testing.   Is due for checkup with ophthalmology.  History of neurofibromatosis.  5/2/2025  Patient seen by physical therapy and had positive Ilene-Hallpike to the right.  Epley maneuver performed.  History of Present Illness  The patient presents for follow up evaluation of dizziness, lightheadedness, and fibromatosis.    She reports an improvement in her dizziness this month, with the exception of occasional episodes of lightheadedness. She has been experiencing dizziness and lightheadedness, initially attributed to her ears. She sought treatment from a physical therapist who used a vibrating device on her head for BPPV. She was advised to remain upright and avoid lying down, as it could exacerbate her condition. Despite this advice, her symptoms worsened the following week. She consulted with Dr. Cueva and Dr. Martinez, both of whom suggested that her symptoms might be due to low blood sugar levels.     She has been monitoring her heart rate, which typically ranges in the 50s, occasionally rising to the 60s. Her oxygen levels have been fluctuating between 91 and 96, with a few  "instances of reaching 99. She has also experienced a few episodes of her heart rate dropping to 48.     She has been diagnosed with neurofibromatosis and has several spots on her body that cause her discomfort. She has had some of these spots removed by Dr. Knight, who informed her that they could potentially develop into cancer. Would like to follow up with Dr. Reed to have several lesions removed.     PAST SURGICAL HISTORY:  - Multiple surgeries for neurofibromatosis performed by Dr. Knight, including one on the face and neck.      Vital Signs:  Vitals:    06/27/25 1331   BP: 170/69   Pulse: 57   Temp: 97.8 °F (36.6 °C)   TempSrc: Temporal   Weight: 73.9 kg (163 lb)   Height: 152.4 cm (60\")        Past Medical History:   Diagnosis Date    Bradycardia     Depression     Dizziness     Hyperlipidemia     Hypertension        Past Surgical History:   Procedure Laterality Date    APPENDECTOMY      BREAST AUGMENTATION      CARDIAC CATHETERIZATION      HYSTERECTOMY      MASTECTOMY      TONSILLECTOMY           Current Outpatient Medications:     alendronate (Fosamax) 70 MG tablet, Take 1 tablet by mouth Every 7 (Seven) Days., Disp: 13 tablet, Rfl: 3    amLODIPine (NORVASC) 5 MG tablet, Take 0.5 tablets by mouth Daily As Needed (Elevated blood pressure)., Disp: 30 tablet, Rfl: 1    aspirin 81 MG EC tablet, Take 1 tablet by mouth Take As Directed. 3 days a week, Disp: , Rfl:     atorvastatin (LIPITOR) 20 MG tablet, Take 1 tablet by mouth Every Night., Disp: 90 tablet, Rfl: 1    escitalopram (LEXAPRO) 10 MG tablet, TAKE 1 TABLET BY MOUTH DAILY, Disp: 90 tablet, Rfl: 1    Omeprazole 20 MG tablet delayed-release, Take 20 mg by mouth Daily., Disp: , Rfl:     prochlorperazine (COMPAZINE) 5 MG tablet, 1 to 2 tablets every 6 hours as needed for severe dizziness.  Caution with driving., Disp: 20 tablet, Rfl: 0    sucralfate (CARAFATE) 1 g tablet, Take 1 tablet by mouth Every 12 (Twelve) Hours., Disp: , Rfl:     tiZANidine (ZANAFLEX) 4 MG " tablet, TAKE 1 TABLET BY MOUTH AT NIGHT AS NEEDED FOR MUSCLE SPASMS, Disp: 90 tablet, Rfl: 1    valsartan (DIOVAN) 80 MG tablet, Take 0.5 tablets by mouth Daily As Needed (Hypertension)., Disp: 30 tablet, Rfl: 1     Allergies   Allergen Reactions    Acetaminophen Unknown - High Severity    Adhesive Tape Rash    Ibuprofen Unknown - High Severity       Social History     Tobacco Use    Smoking status: Never     Passive exposure: Never    Smokeless tobacco: Never   Vaping Use    Vaping status: Never Used   Substance Use Topics    Alcohol use: Yes     Comment: Occassionally, once a month    Drug use: No        Objective     Tobacco Use: Low Risk  (6/27/2025)    Patient History     Smoking Tobacco Use: Never     Smokeless Tobacco Use: Never     Passive Exposure: Never   Recent Concern: Tobacco Use - Medium Risk (5/2/2025)    Received from [facility]    Patient History     Smoking Tobacco Use: Former     Smokeless Tobacco Use: Never     Passive Exposure: Not on file         Physical Exam    Constitutional   Appearance  well developed, well-nourished, alert and in no acute distress, voice clear and strong    Head   Inspection  no deformities or lesions, atraumatic    Face   Inspection  no facial lesions; House-Brackmann I/VI bilaterally   Palpation  no TMJ crepitus nor  muscle tenderness bilaterally     Eyes/Vision   Visual Fields  extraocular movements are intact, no spontaneous or gaze-induced nystagmus  Conjunctivae  clear   Sclerae  clear   Pupils and Irises  pupils equal, round, and reactive to light.   Nystagmus  not present     Ears, Nose, Mouth and Throat  Ears  External Ears  Auricles appearance within normal limits, no lesions present   Otoscopic Examination  tympanic membrane appearance within normal limits bilaterally without perforations, well-aerated middle ears without evidence of effusion  Hearing  intact to conversational voice both ears   Tunning fork testing     Rinne:  Cerrato:    Nose  External Nose  appearance normal   Intranasal Exam  mucosa within normal limits, vestibules normal, no intranasal lesions present, septum midline, sinuses non tender to percussion   Modified Mayur Test:    Oral Cavity  Oral Mucosa  oral mucosa normal without pallor or cyanosis   Stensen's and Warthin's ducts are productive and patent with clear saliva  Lips  lip appearance normal   Teeth  normal dentition for age   Gums  gums pink, non-swollen, no bleeding present   Tongue  tongue appearance normal; normal mobility   Palate  hard palate normal, soft palate appearance normal with symmetric mobility     Throat  Oropharynx  no inflammation or lesions present  Tonsils  Bilateral tonsils unremarkable  Hypopharynx  appearance within normal limits   Larynx  voice normal     Neck  Inspection/Palpation  normal appearance, no masses or tenderness, trachea midline; thyroid size normal, nontender, no nodules or masses present on palpation     Lymphatic  Neck  no lymphadenopathy present   Supraclavicular Nodes  no lymphadenopathy present   Preauricular Nodes  no lymphadenopathy present     Respiratory  Respiratory Effort  breathing unlabored   Inspection of Chest  normal appearance, no retractions     Musculoskeletal   Cervical back: Normal range of motion and neck supple.      Skin and Subcutaneous Tissue  General Inspection  Regarding face and neck -neurofibromatosis lesions diffuse   neurologic  Cranial Nerves  Alert and oriented x3  cranial nerves II-XII are grossly intact bilaterally   Gait and Station  normal gait, able to stand without diffculty    Psychiatric  Judgement and Insight  judgment and insight intact   Mood and Affect  mood normal, affect appropriate       RESULTS REVIEWED    I have reviewed the following information:   [x]  Previous Internal Note  []  Previous External Note:   [x]  Ordered Tests & Results:      Pathology:   Lab Results   Component Value Date    Microscopic  Description  11/06/2019     1. Sections demonstrate polypoid colonic mucosa with villous and glandular architecture and low-grade adenomatous cytologic changes.  2.  Sections demonstrate polypoid colonic mucosa composed of enlarged glands with dilated and serrated lumens.  3.  Histologic sections show fragments of benign gastric mucosa with lamina propria edema and patchy minimal chronic inflammation.  No significant active inflammation is seen.  Immunohistochemical stain for Helicobacter pylori is negative.         TSH   Date Value Ref Range Status   05/22/2025 3.400 0.270 - 4.200 uIU/mL Final   03/08/2022 0.884 0.270 - 4.200 uIU/mL Final     Free T4   Date Value Ref Range Status   05/22/2025 1.07 0.92 - 1.68 ng/dL Final   03/08/2022 0.95 0.93 - 1.70 ng/dL Final     Calcium   Date Value Ref Range Status   05/22/2025 8.8 8.6 - 10.5 mg/dL Final   09/26/2022 9.1 8.8 - 10.2 mg/dL Final     25 Hydroxy, Vitamin D   Date Value Ref Range Status   05/08/2021 30.6 >=30 ng/mL Final     Comment:     <=20 ng/mL.............Deficient  21-29 ng/mL...........Insufficient  >=30 ng/mL..........Sufficient       CT Chest Low Dose Follow Up Without Contrast  Result Date: 6/17/2025  1. Stable 5 mm right upper lobe and lingular nodules which can be considered benign. No new suspicious nodule. 2. Pulmonary emphysema. No acute pulmonary process 3. Coronary artery atherosclerosis 4. 1 cm proteinaceous right renal cyst 5. Left renal atrophy 6. Cholelithiasis Recommendation: Continue annual screening with LDCT Lung Rads Assessment: Lung-RADS L2 - Benign appearance or <1% chance of malignancy. Electronically Signed: Orlando Cerrato  6/17/2025 11:32 AM EDT  Workstation ID: [id number]    DEXA Bone Density Axial  Result Date: 4/16/2025  Impression: Osteoporosis. Report dictated by: Petty Arora  I have personally reviewed this case and agree with the findings above: Electronically Signed: Silver Vizcarra MD  4/16/2025 4:19 PM EDT  Workstation ID:  [id number]     Thyroid  Result Date: 4/15/2025  Impression: Sequelae of chronic thyroiditis with subcentimeter nodules requiring no follow-up per ACR guidelines. TI-RADS: TR2 - Not Suspicious. No follow up needed. Electronically Signed: Issa Herrera MD  4/15/2025 5:26 PM EDT  Workstation ID: [id number]        I have discussed the interpretation of the above results with the patient.    Procedures          Assessment and Plan   Diagnoses and all orders for this visit:    1. Sensorineural hearing loss (SNHL) of right ear with restricted hearing of left ear (Primary)    2. Sensorineural hearing loss (SNHL) of left ear with restricted hearing of right ear    3. Imbalance    4. BPPV (benign paroxysmal positional vertigo), right    5. Vertigo    6. Neurofibromatosis        Assessment & Plan  1.  BPPV.  BPPV symptoms seem to have significantly decreased.  Patient states that she only has occasional episodes now with turning her head.If vertigo returns, an Epley maneuver can be performed in the office.     Dizziness and lightheadedness.  The lightheadedness may be due to factors such as blood pressure, heart rate, or blood sugar levels. Heart rate running in the 50s could contribute to lightheadedness. Oxygen levels are running low, between 91-96%, which could also be a factor. Advised to follow up with primary care physician to address these issues.     2. Neurofibromatosis.  Multiple fibromas are bothersome and have been previously removed. Advised to follow up with Dr. Reed to discuss the removal of any new or bothersome fibromas.    Follow-up  Follow up in 1 year or sooner if necessary.      (H90.A21) Sensorineural hearing loss (SNHL) of right ear with restricted hearing of left ear    (H90.A22) Sensorineural hearing loss (SNHL) of left ear with restricted hearing of right ear    (R26.89) Imbalance    (H81.11) BPPV (benign paroxysmal positional vertigo), right    (R42) Vertigo    (Q85.00) Neurofibromatosis     Bharti  Serenity Nobles  reports that she has never smoked. She has never been exposed to tobacco smoke. She has never used smokeless tobacco.       Plan:  There are no Patient Instructions on file for this visit.      Follow Up   Return in about 3 months (around 9/27/2025), or removal of several neurofibromatosis lesions, for Follow up with Dr. Reed.  Patient was given instructions and counseling regarding her condition or for health maintenance advice. Please see specific information pulled into the AVS if appropriate.      Patient or patient representative verbalized consent for the use of Ambient Listening during the visit with  MAUREEN Fields for chart documentation. 6/27/2025  14:16 EDT    All or a portion of this Note was dictated utilizing Dragon Dictation.

## 2025-06-27 ENCOUNTER — OFFICE VISIT (OUTPATIENT)
Dept: OTOLARYNGOLOGY | Facility: CLINIC | Age: 80
End: 2025-06-27
Payer: MEDICARE

## 2025-06-27 VITALS
WEIGHT: 163 LBS | HEIGHT: 60 IN | BODY MASS INDEX: 32 KG/M2 | TEMPERATURE: 97.8 F | SYSTOLIC BLOOD PRESSURE: 170 MMHG | DIASTOLIC BLOOD PRESSURE: 69 MMHG | HEART RATE: 57 BPM

## 2025-06-27 DIAGNOSIS — Q85.00 NEUROFIBROMATOSIS: ICD-10-CM

## 2025-06-27 DIAGNOSIS — H81.11 BPPV (BENIGN PAROXYSMAL POSITIONAL VERTIGO), RIGHT: ICD-10-CM

## 2025-06-27 DIAGNOSIS — R26.89 IMBALANCE: ICD-10-CM

## 2025-06-27 DIAGNOSIS — R42 VERTIGO: ICD-10-CM

## 2025-06-27 DIAGNOSIS — H90.A21 SENSORINEURAL HEARING LOSS (SNHL) OF RIGHT EAR WITH RESTRICTED HEARING OF LEFT EAR: Primary | ICD-10-CM

## 2025-06-27 DIAGNOSIS — H90.A22 SENSORINEURAL HEARING LOSS (SNHL) OF LEFT EAR WITH RESTRICTED HEARING OF RIGHT EAR: ICD-10-CM

## 2025-07-01 ENCOUNTER — OFFICE VISIT (OUTPATIENT)
Age: 80
End: 2025-07-01
Payer: MEDICARE

## 2025-07-01 ENCOUNTER — NURSE TRIAGE (OUTPATIENT)
Dept: CALL CENTER | Facility: HOSPITAL | Age: 80
End: 2025-07-01
Payer: MEDICARE

## 2025-07-01 VITALS
SYSTOLIC BLOOD PRESSURE: 154 MMHG | OXYGEN SATURATION: 96 % | WEIGHT: 163.2 LBS | BODY MASS INDEX: 32.04 KG/M2 | DIASTOLIC BLOOD PRESSURE: 64 MMHG | HEART RATE: 70 BPM | HEIGHT: 60 IN

## 2025-07-01 DIAGNOSIS — R94.31 ABNORMAL EKG: Primary | ICD-10-CM

## 2025-07-01 DIAGNOSIS — I10 ESSENTIAL HYPERTENSION: ICD-10-CM

## 2025-07-01 DIAGNOSIS — E78.2 MIXED HYPERLIPIDEMIA: ICD-10-CM

## 2025-07-01 DIAGNOSIS — E55.9 VITAMIN D DEFICIENCY: ICD-10-CM

## 2025-07-01 PROCEDURE — 1126F AMNT PAIN NOTED NONE PRSNT: CPT | Performed by: NURSE PRACTITIONER

## 2025-07-01 PROCEDURE — 1159F MED LIST DOCD IN RCRD: CPT | Performed by: NURSE PRACTITIONER

## 2025-07-01 PROCEDURE — 3078F DIAST BP <80 MM HG: CPT | Performed by: NURSE PRACTITIONER

## 2025-07-01 PROCEDURE — 3077F SYST BP >= 140 MM HG: CPT | Performed by: NURSE PRACTITIONER

## 2025-07-01 PROCEDURE — G2211 COMPLEX E/M VISIT ADD ON: HCPCS | Performed by: NURSE PRACTITIONER

## 2025-07-01 PROCEDURE — 1160F RVW MEDS BY RX/DR IN RCRD: CPT | Performed by: NURSE PRACTITIONER

## 2025-07-01 PROCEDURE — 99214 OFFICE O/P EST MOD 30 MIN: CPT | Performed by: NURSE PRACTITIONER

## 2025-07-01 NOTE — PROGRESS NOTES
Chief Complaint  Slow Heart Rate (Pt says her heart rate has been running low. )    Subjective      Bharti Nobles is a 79 y.o. female who presents to [facility]     History of Present Illness  The patient presents for evaluation of low heart rate and dizziness.    She has been monitoring her heart rate at home using an oximeter and blood pressure device, which have shown readings of hr  ranging from 49 to 98. She was advised to maintain a record of these readings during her last visit her b/p has been in good range showing 120-140/70-90. Her heart rate fluctuates between the 50s and 90s.   No chest pain or no palpitations.  She is unsure about the batteries in her blood pressure device. She does not experience shortness of breath even when her oxygen levels drop to 90 or 91. She has a history of smoking, having quit in 1998.  She denies any cough fever chills does not feel short of breath however her readings states that she is running around 90%.  Today in the office her blood pressure is good at 154/64 heart rate is 70,  O2 sats on room air 96% .  Allowed to sit and then heart rate drops in the 50s and occasional slight irregular    EKG done as heart rate slightly irregular shows nothing acute,  heart rate sinus rhythm 56,  however does show possible ischemia in the anterior lateral leads with ST inversion.  When compared to previous EKG done August 2024 essentially the same.  Patient has seen cardiology in the past Dr. Perez however it has been sometime ago states if she has to see a heart doctor would like to see a different cardiologist.  I have encouraged her to go ahead and keep a record of her heart rate and blood pressure bring her machine to by us so that we can check to ensure they are consistent with ours but will refer her over to cardiology due to the abnormal EKG but explained to her it is essentially the same as it was last year, however it any chest pain  "or palpitations has to go the emergency room.  She verbalizes understanding.    Review of Systems  Constitutional:  Negative for activity change, fatigue and fever.  HENT:  Negative for congestion, rhinorrhea and sore throat.    Eyes:  Negative for discharge and redness.  Respiratory:  Negative for cough. Negative for shortness of breath, wheezing and stridor.    Cardiovascular:  Negative for chest pain.  Gastrointestinal:  Negative for abdominal pain.  Genitourinary:  Negative for dysuria.  Musculoskeletal:  Negative for arthralgias.  Skin:  Negative for rash and wound.  Neurological:  Negative for weakness and headaches.  Hematological:  Negative for adenopathy.    SOCIAL HISTORY  The patient was a smoker and quit in 1998.         Objective   Vital Signs:   Vitals:    07/01/25 1357   BP: 154/64   Pulse: 70   SpO2: 96%   Weight: 74 kg (163 lb 3.2 oz)   Height: 152.4 cm (60\")     Body mass index is 31.87 kg/m².    Wt Readings from Last 3 Encounters:   07/01/25 74 kg (163 lb 3.2 oz)   06/27/25 73.9 kg (163 lb)   06/25/25 74.4 kg (164 lb)     BP Readings from Last 3 Encounters:   07/01/25 154/64   06/27/25 170/69   06/25/25 153/57       Health Maintenance   Topic Date Due    Pneumococcal Vaccine 50+ (2 of 2 - PCV) 07/09/2025 (Originally 8/15/2020)    TDAP/TD VACCINES (1 - Tdap) 07/09/2025 (Originally 7/15/1964)    RSV Vaccine - Adults (1 - 1-dose 75+ series) 06/25/2026 (Originally 7/15/2020)    LIPID PANEL  05/22/2026    ANNUAL WELLNESS VISIT  06/24/2026    DXA SCAN  04/14/2027    HEPATITIS C SCREENING  Completed    ZOSTER VACCINE  Completed    COVID-19 Vaccine  Discontinued    INFLUENZA VACCINE  Discontinued    COLORECTAL CANCER SCREENING  Discontinued       Past Medical History:   Diagnosis Date    Bradycardia     Depression     Dizziness     Hyperlipidemia     Hypertension      Past Surgical History:   Procedure Laterality Date    APPENDECTOMY      BREAST AUGMENTATION      CARDIAC CATHETERIZATION      HYSTERECTOMY  "     MASTECTOMY      TONSILLECTOMY       History reviewed. No pertinent family history.  Social History     Socioeconomic History    Marital status:    Tobacco Use    Smoking status: Never     Passive exposure: Never    Smokeless tobacco: Never   Vaping Use    Vaping status: Never Used   Substance and Sexual Activity    Alcohol use: Yes     Comment: Occassionally, once a month    Drug use: No    Sexual activity: Defer       Current Outpatient Medications   Medication Instructions    alendronate (FOSAMAX) 70 mg, Oral, Every 7 Days    amLODIPine (NORVASC) 2.5 mg, Oral, Daily PRN    aspirin 81 mg, Take As Directed    atorvastatin (LIPITOR) 20 mg, Oral, Nightly    escitalopram (LEXAPRO) 10 mg, Oral, Daily    Omeprazole 20 MG tablet delayed-release 1 tablet, Daily    prochlorperazine (COMPAZINE) 5 MG tablet 1 to 2 tablets every 6 hours as needed for severe dizziness.  Caution with driving.    sucralfate (CARAFATE) 1 g tablet 1 tablet, Every 12 Hours Scheduled    tiZANidine (ZANAFLEX) 4 mg, Oral, Nightly PRN    valsartan (DIOVAN) 40 mg, Daily PRN       There are no discontinued medications.     Physical Exam  Constitutional:       Appearance: Normal appearance.   HENT:      Head: Normocephalic.      Nose: Nose normal.      Mouth/Throat:      Mouth: Mucous membranes are moist.   Eyes:      Conjunctiva/sclera: Conjunctivae normal.      Pupils: Pupils are equal, round, and reactive to light.   Cardiovascular:      Rate and Rhythm: Normal rate and regular rhythm.   Pulmonary:      Effort: Pulmonary effort is normal.      Breath sounds: Normal breath sounds.   Abdominal:      General: Bowel sounds are normal.      Palpations: Abdomen is soft.   Musculoskeletal:         General: Normal range of motion.      Cervical back: Normal range of motion.   Skin:     General: Skin is warm and dry.   Neurological:      General: No focal deficit present.      Mental Status: She is alert and oriented to person, place, and time.    Psychiatric:         Mood and Affect: Mood normal.         Behavior: Behavior normal.         Thought Content: Thought content normal.          Physical Exam  Cardiovascular: Regular rate and rhythm, no murmurs, rubs, or gallops       Result Review :  The following data was reviewed by: MAUREEN Curry on 07/01/2025:    Labs  Lab on 05/22/2025   Component Date Value Ref Range Status    Glucose 05/22/2025 89  65 - 99 mg/dL Final    BUN 05/22/2025 19  8 - 23 mg/dL Final    Creatinine 05/22/2025 1.17 (H)  0.57 - 1.00 mg/dL Final    Sodium 05/22/2025 140  136 - 145 mmol/L Final    Potassium 05/22/2025 4.3  3.5 - 5.2 mmol/L Final    Chloride 05/22/2025 106  98 - 107 mmol/L Final    CO2 05/22/2025 24.0  22.0 - 29.0 mmol/L Final    Calcium 05/22/2025 8.8  8.6 - 10.5 mg/dL Final    Total Protein 05/22/2025 6.9  6.0 - 8.5 g/dL Final    Albumin 05/22/2025 3.8  3.5 - 5.2 g/dL Final    ALT (SGPT) 05/22/2025 12  1 - 33 U/L Final    AST (SGOT) 05/22/2025 17  1 - 32 U/L Final    Alkaline Phosphatase 05/22/2025 107  39 - 117 U/L Final    Total Bilirubin 05/22/2025 0.9  0.0 - 1.2 mg/dL Final    Globulin 05/22/2025 3.1  gm/dL Final    A/G Ratio 05/22/2025 1.2  g/dL Final    BUN/Creatinine Ratio 05/22/2025 16.2  7.0 - 25.0 Final    Anion Gap 05/22/2025 10.0  5.0 - 15.0 mmol/L Final    eGFR 05/22/2025 47.6 (L)  >60.0 mL/min/1.73 Final    Total Cholesterol 05/22/2025 164  0 - 200 mg/dL Final    Triglycerides 05/22/2025 117  0 - 150 mg/dL Final    HDL Cholesterol 05/22/2025 54  40 - 60 mg/dL Final    LDL Cholesterol  05/22/2025 89  0 - 100 mg/dL Final    VLDL Cholesterol 05/22/2025 21  5 - 40 mg/dL Final    LDL/HDL Ratio 05/22/2025 1.60   Final    Vitamin B-12 05/22/2025 243  211 - 946 pg/mL Final    Folate 05/22/2025 12.30  4.78 - 24.20 ng/mL Final    TSH 05/22/2025 3.400  0.270 - 4.200 uIU/mL Final    Free T4 05/22/2025 1.07  0.92 - 1.68 ng/dL Final    Creatine Kinase 05/22/2025 23  20 - 180 U/L Final    WBC 05/22/2025  6.06  3.40 - 10.80 10*3/mm3 Final    RBC 05/22/2025 4.43  3.77 - 5.28 10*6/mm3 Final    Hemoglobin 05/22/2025 13.3  12.0 - 15.9 g/dL Final    Hematocrit 05/22/2025 41.2  34.0 - 46.6 % Final    MCV 05/22/2025 93.0  79.0 - 97.0 fL Final    MCH 05/22/2025 30.0  26.6 - 33.0 pg Final    MCHC 05/22/2025 32.3  31.5 - 35.7 g/dL Final    RDW 05/22/2025 13.1  12.3 - 15.4 % Final    RDW-SD 05/22/2025 44.9  37.0 - 54.0 fl Final    MPV 05/22/2025 11.5  6.0 - 12.0 fL Final    Platelets 05/22/2025 232  140 - 450 10*3/mm3 Final    Neutrophil % 05/22/2025 62.7  42.7 - 76.0 % Final    Lymphocyte % 05/22/2025 22.3  19.6 - 45.3 % Final    Monocyte % 05/22/2025 10.2  5.0 - 12.0 % Final    Eosinophil % 05/22/2025 3.3  0.3 - 6.2 % Final    Basophil % 05/22/2025 1.0  0.0 - 1.5 % Final    Immature Grans % 05/22/2025 0.5  0.0 - 0.5 % Final    Neutrophils, Absolute 05/22/2025 3.80  1.70 - 7.00 10*3/mm3 Final    Lymphocytes, Absolute 05/22/2025 1.35  0.70 - 3.10 10*3/mm3 Final    Monocytes, Absolute 05/22/2025 0.62  0.10 - 0.90 10*3/mm3 Final    Eosinophils, Absolute 05/22/2025 0.20  0.00 - 0.40 10*3/mm3 Final    Basophils, Absolute 05/22/2025 0.06  0.00 - 0.20 10*3/mm3 Final    Immature Grans, Absolute 05/22/2025 0.03  0.00 - 0.05 10*3/mm3 Final    nRBC 05/22/2025 0.0  0.0 - 0.2 /100 WBC Final       Imaging  CT Chest Low Dose Follow Up Without Contrast  Result Date: 6/17/2025  1. Stable 5 mm right upper lobe and lingular nodules which can be considered benign. No new suspicious nodule. 2. Pulmonary emphysema. No acute pulmonary process 3. Coronary artery atherosclerosis 4. 1 cm proteinaceous right renal cyst 5. Left renal atrophy 6. Cholelithiasis Recommendation: Continue annual screening with LDCT Lung Rads Assessment: Lung-RADS L2 - Benign appearance or <1% chance of malignancy. Electronically Signed: Orlando Cerrato  6/17/2025 11:32 AM EDT  Workstation ID: [id number]    DEXA Bone Density Axial  Result Date: 4/16/2025  Impression: Osteoporosis.  Report dictated by: Petty Arora  I have personally reviewed this case and agree with the findings above: Electronically Signed: Silver Vizcarra MD  4/16/2025 4:19 PM EDT  Workstation ID: [id number]    US Thyroid  Result Date: 4/15/2025  Impression: Sequelae of chronic thyroiditis with subcentimeter nodules requiring no follow-up per ACR guidelines. TI-RADS: TR2 - Not Suspicious. No follow up needed. Electronically Signed: Issa Herrera MD  4/15/2025 5:26 PM EDT  Workstation ID: [id number]      Results         Procedures       ASSESSMENT & PLAN  Diagnoses and all orders for this visit:    1. Abnormal EKG (Primary)  -     Ambulatory Referral to Cardiology for Other; Fluctuating heart rate abnormal EKG    2. Essential hypertension  -     Ambulatory Referral to Cardiology for Other; Fluctuating heart rate abnormal EKG    3. Mixed hyperlipidemia  -     Ambulatory Referral to Cardiology for Other; Fluctuating heart rate abnormal EKG    4. Vitamin D deficiency  -     Ambulatory Referral to Cardiology for Other; Fluctuating heart rate abnormal EKG         Assessment & Plan  1. Low heart rate.  - Heart rate at home ranges from 49 to 58 bpm; clinic heart rate was 70 bpm.  - Advised to bring home monitor for comparison with clinic equipment to ensure accuracy.  - Continue monitoring heart rate, ensuring it remains above 50 bpm.  - EKG will be performed to further evaluate heart function.    2. Dizziness.  - Reports dizziness, which was absent for the last month but recurred today.  - Blood pressure was 120/78 mmHg during the last visit and slightly higher today.  - Advised to monitor blood pressure at home, ensuring it stays above 100/50 mmHg and below 140 mmHg.  - If dizziness persists, further evaluation may be necessary.                  FOLLOW UP  No follow-ups on file.  Patient was given instructions and counseling regarding her condition or for health maintenance advice. Please see specific information pulled into the  AVS if appropriate.     Patient or patient representative verbalized consent for the use of Ambient Listening during the visit with  MAUREEN Curry for chart documentation. 7/1/2025  14:54 EDT    MAUREEN Curry  07/01/25  14:54 EDT

## 2025-07-01 NOTE — PATIENT INSTRUCTIONS
Bring in your machine and so we can compare readings with ours to ensure accuracy  Any chest pain palpitations have to be seen right away in the emergency room  Continue to monitor and keep a log  Will refer to cardiology due to varying heart rate and variation in the EKG.  No change in medication at this time

## 2025-07-01 NOTE — TELEPHONE ENCOUNTER
Patient called to speak to her provider about her on-going lower HR. She said that her PCP asked her to monitor and record her BP, HR, and SPO2 for the last few weeks. She said that on average her HR was been between 48-57, which is a little lower than usual for her. She said she is checking it 3 times per day. She said she has also been feeling dizzy for several months, but last month was better, and she is not dizzy right now.   Triage completed and patient warm transferred to the office to speak to a provider.   Reason for Disposition   Age > 60 years  (Exception: Brief heartbeat symptoms that went away and now feels well.)    Additional Information   Negative: Passed out (i.e., lost consciousness, collapsed and was not responding)   Negative: Shock suspected (e.g., cold/pale/clammy skin, too weak to stand, low BP, rapid pulse)   Negative: Difficult to awaken or acting confused (e.g., disoriented, slurred speech)   Negative: Visible sweat on face or sweat dripping down face   Negative: Unable to walk, or can only walk with assistance (e.g., requires support)   Negative: [1] Received SHOCK from implantable cardiac defibrillator AND [2] persisting symptoms (i.e., palpitations, lightheadedness)   Negative: [1] Dizziness, lightheadedness, or weakness AND [2] heart beating very rapidly (e.g., > 140 / minute)   Negative: [1] Dizziness, lightheadedness, or weakness AND [2] heart beating very slowly (e.g., < 50 / minute)   Negative: Sounds like a life-threatening emergency to the triager   Negative: Chest pain   Negative: Implantable Cardiac Defibrillator (ICD) or a pacemaker symptoms or questions   Negative: Difficulty breathing   Negative: Dizziness, lightheadedness, or weakness   Negative: [1] Heart beating very rapidly (e.g., > 140 / minute) AND [2] present now  (Exception: During exercise.)   Negative: Heart beating very slowly (e.g., < 50 / minute)  (Exception: Athlete and heart rate normal for caller.)   Negative:  "New or worsened shortness of breath with activity (dyspnea on exertion)   Negative: Patient sounds very sick or weak to the triager   Negative: [1] Heart beating very rapidly (e.g., > 140 / minute) AND [2] not present now  (Exception: During exercise.)   Negative: [1] Skipped or extra beat(s) AND [2] increases with exercise or exertion   Negative: [1] Skipped or extra beat(s) AND [2] occurs 4 or more times per minute   Negative: New or worsened ankle swelling   Negative: History of heart disease (i.e., heart attack, bypass surgery, angina, angioplasty, CHF)  (Exception: Brief heartbeat symptoms that went away and now feels well.)    Answer Assessment - Initial Assessment Questions  1. DESCRIPTION: \"Please describe your heart rate or heartbeat that you are having\" (e.g., fast/slow, regular/irregular, skipped or extra beats, \"palpitations\")      HR has been running between 48-57 for the last few weeks, she has been checking it 3 times per day with her BP ad pulse ox,   Her BP is usually -160 and pulse ox is usually 92-96%  2. ONSET: \"When did it start?\" (Minutes, hours or days)       3 weeks  3. DURATION: \"How long does it last\" (e.g., seconds, minutes, hours)      Constant   4. PATTERN \"Does it come and go, or has it been constant since it started?\"  \"Does it get worse with exertion?\"   \"Are you feeling it now?\"      HR goes up when she is sitting down   5. TAP: \"Using your hand, can you tap out what you are feeling on a chair or table in front of you, so that I can hear?\" (Note: not all patients can do this)        no  6. HEART RATE: \"Can you tell me your heart rate?\" \"How many beats in 15 seconds?\"  (Note: not all patients can do this)        48-57  7. RECURRENT SYMPTOM: \"Have you ever had this before?\" If Yes, ask: \"When was the last time?\" and \"What happened that time?\"       HR dropped down into the 30s in 2011   8. CAUSE: \"What do you think is causing the palpitations?\"      NA  9. CARDIAC HISTORY: \"Do " "you have any history of heart disease?\" (e.g., heart attack, angina, bypass surgery, angioplasty, arrhythmia)       No   10. OTHER SYMPTOMS: \"Do you have any other symptoms?\" (e.g., dizziness, chest pain, sweating, difficulty breathing)        Dizzy at times   11. PREGNANCY: \"Is there any chance you are pregnant?\" \"When was your last menstrual period?\"        NA    Protocols used: Heart Rate and Heartbeat Questions-ADULT-AH    "

## 2025-08-09 ENCOUNTER — APPOINTMENT (OUTPATIENT)
Dept: GENERAL RADIOLOGY | Facility: HOSPITAL | Age: 80
End: 2025-08-09
Payer: MEDICARE

## 2025-08-09 ENCOUNTER — APPOINTMENT (OUTPATIENT)
Dept: CT IMAGING | Facility: HOSPITAL | Age: 80
End: 2025-08-09
Payer: MEDICARE

## 2025-08-09 ENCOUNTER — HOSPITAL ENCOUNTER (EMERGENCY)
Facility: HOSPITAL | Age: 80
Discharge: HOME | End: 2025-08-10
Attending: EMERGENCY MEDICINE
Payer: MEDICARE

## 2025-08-09 DIAGNOSIS — R00.1 SINUS BRADYCARDIA: ICD-10-CM

## 2025-08-09 DIAGNOSIS — Z87.898 HISTORY OF DIZZINESS: Primary | ICD-10-CM

## 2025-08-09 DIAGNOSIS — J18.9 MULTIFOCAL PNEUMONIA: ICD-10-CM

## 2025-08-09 LAB
ALBUMIN SERPL-MCNC: 4 G/DL (ref 3.5–5.2)
ALBUMIN/GLOB SERPL: 1.4 G/DL
ALP SERPL-CCNC: 113 U/L (ref 39–117)
ALT SERPL W P-5'-P-CCNC: 9 U/L (ref 1–33)
ANION GAP SERPL CALCULATED.3IONS-SCNC: 10.7 MMOL/L (ref 5–15)
AST SERPL-CCNC: 15 U/L (ref 1–32)
BACTERIA UR QL AUTO: NORMAL /HPF
BASOPHILS # BLD AUTO: 0.09 10*3/MM3 (ref 0–0.2)
BASOPHILS NFR BLD AUTO: 1.3 % (ref 0–1.5)
BILIRUB SERPL-MCNC: 0.5 MG/DL (ref 0–1.2)
BILIRUB UR QL STRIP: NEGATIVE
BUN SERPL-MCNC: 16.1 MG/DL (ref 8–23)
BUN/CREAT SERPL: 15.5 (ref 7–25)
CALCIUM SPEC-SCNC: 8.8 MG/DL (ref 8.6–10.5)
CHLORIDE SERPL-SCNC: 103 MMOL/L (ref 98–107)
CLARITY UR: CLEAR
CO2 SERPL-SCNC: 25.3 MMOL/L (ref 22–29)
COLOR UR: YELLOW
CREAT SERPL-MCNC: 1.04 MG/DL (ref 0.57–1)
DEPRECATED RDW RBC AUTO: 43.7 FL (ref 37–54)
EGFRCR SERPLBLD CKD-EPI 2021: 54.4 ML/MIN/1.73
EOSINOPHIL # BLD AUTO: 0.3 10*3/MM3 (ref 0–0.4)
EOSINOPHIL NFR BLD AUTO: 4.5 % (ref 0.3–6.2)
ERYTHROCYTE [DISTWIDTH] IN BLOOD BY AUTOMATED COUNT: 13.1 % (ref 12.3–15.4)
GEN 5 1HR TROPONIN T REFLEX: 10 NG/L
GLOBULIN UR ELPH-MCNC: 2.9 GM/DL
GLUCOSE SERPL-MCNC: 92 MG/DL (ref 65–99)
GLUCOSE UR STRIP-MCNC: NEGATIVE MG/DL
HCT VFR BLD AUTO: 42.3 % (ref 34–46.6)
HGB BLD-MCNC: 13.5 G/DL (ref 12–15.9)
HGB UR QL STRIP.AUTO: NEGATIVE
HOLD SPECIMEN: NORMAL
HOLD SPECIMEN: NORMAL
HYALINE CASTS UR QL AUTO: NORMAL /LPF
IMM GRANULOCYTES # BLD AUTO: 0.02 10*3/MM3 (ref 0–0.05)
IMM GRANULOCYTES NFR BLD AUTO: 0.3 % (ref 0–0.5)
KETONES UR QL STRIP: NEGATIVE
LEUKOCYTE ESTERASE UR QL STRIP.AUTO: ABNORMAL
LYMPHOCYTES # BLD AUTO: 1.75 10*3/MM3 (ref 0.7–3.1)
LYMPHOCYTES NFR BLD AUTO: 26 % (ref 19.6–45.3)
MAGNESIUM SERPL-MCNC: 2.1 MG/DL (ref 1.6–2.4)
MCH RBC QN AUTO: 29.1 PG (ref 26.6–33)
MCHC RBC AUTO-ENTMCNC: 31.9 G/DL (ref 31.5–35.7)
MCV RBC AUTO: 91.2 FL (ref 79–97)
MONOCYTES # BLD AUTO: 0.58 10*3/MM3 (ref 0.1–0.9)
MONOCYTES NFR BLD AUTO: 8.6 % (ref 5–12)
NEUTROPHILS NFR BLD AUTO: 3.98 10*3/MM3 (ref 1.7–7)
NEUTROPHILS NFR BLD AUTO: 59.3 % (ref 42.7–76)
NITRITE UR QL STRIP: NEGATIVE
NRBC BLD AUTO-RTO: 0 /100 WBC (ref 0–0.2)
NT-PROBNP SERPL-MCNC: 376.4 PG/ML (ref 0–1800)
PH UR STRIP.AUTO: 6.5 [PH] (ref 5–8)
PLATELET # BLD AUTO: 200 10*3/MM3 (ref 140–450)
PMV BLD AUTO: 10.8 FL (ref 6–12)
POTASSIUM SERPL-SCNC: 4.2 MMOL/L (ref 3.5–5.2)
PROT SERPL-MCNC: 6.9 G/DL (ref 6–8.5)
PROT UR QL STRIP: NEGATIVE
RBC # BLD AUTO: 4.64 10*6/MM3 (ref 3.77–5.28)
RBC # UR STRIP: NORMAL /HPF
REF LAB TEST METHOD: NORMAL
SODIUM SERPL-SCNC: 139 MMOL/L (ref 136–145)
SP GR UR STRIP: <=1.005 (ref 1–1.03)
SQUAMOUS #/AREA URNS HPF: NORMAL /HPF
TROPONIN T NUMERIC DELTA: 0 NG/L
TROPONIN T SERPL HS-MCNC: 10 NG/L
UROBILINOGEN UR QL STRIP: ABNORMAL
WBC # UR STRIP: NORMAL /HPF
WBC NRBC COR # BLD AUTO: 6.72 10*3/MM3 (ref 3.4–10.8)
WHOLE BLOOD HOLD COAG: NORMAL
WHOLE BLOOD HOLD SPECIMEN: NORMAL

## 2025-08-09 PROCEDURE — 25010000002 AZITHROMYCIN PER 500 MG

## 2025-08-09 PROCEDURE — 36415 COLL VENOUS BLD VENIPUNCTURE: CPT

## 2025-08-09 PROCEDURE — 70450 CT HEAD/BRAIN W/O DYE: CPT

## 2025-08-09 PROCEDURE — 85025 COMPLETE CBC W/AUTO DIFF WBC: CPT | Performed by: EMERGENCY MEDICINE

## 2025-08-09 PROCEDURE — 25010000002 AMPICILLIN-SULBACTAM PER 1.5 G

## 2025-08-09 PROCEDURE — 84484 ASSAY OF TROPONIN QUANT: CPT | Performed by: EMERGENCY MEDICINE

## 2025-08-09 PROCEDURE — 87040 BLOOD CULTURE FOR BACTERIA: CPT

## 2025-08-09 PROCEDURE — 71045 X-RAY EXAM CHEST 1 VIEW: CPT

## 2025-08-09 PROCEDURE — 96367 TX/PROPH/DG ADDL SEQ IV INF: CPT

## 2025-08-09 PROCEDURE — 93005 ELECTROCARDIOGRAM TRACING: CPT | Performed by: EMERGENCY MEDICINE

## 2025-08-09 PROCEDURE — 25810000003 SODIUM CHLORIDE 0.9 % SOLUTION

## 2025-08-09 PROCEDURE — 99284 EMERGENCY DEPT VISIT MOD MDM: CPT

## 2025-08-09 PROCEDURE — 81001 URINALYSIS AUTO W/SCOPE: CPT | Performed by: EMERGENCY MEDICINE

## 2025-08-09 PROCEDURE — 83880 ASSAY OF NATRIURETIC PEPTIDE: CPT

## 2025-08-09 PROCEDURE — 80053 COMPREHEN METABOLIC PANEL: CPT | Performed by: EMERGENCY MEDICINE

## 2025-08-09 PROCEDURE — 83735 ASSAY OF MAGNESIUM: CPT | Performed by: EMERGENCY MEDICINE

## 2025-08-09 PROCEDURE — 96365 THER/PROPH/DIAG IV INF INIT: CPT

## 2025-08-09 PROCEDURE — 25810000003 SODIUM CHLORIDE 0.9 % SOLUTION 250 ML FLEX CONT

## 2025-08-09 RX ORDER — AMOXICILLIN 500 MG/1
1000 CAPSULE ORAL 3 TIMES DAILY
Qty: 30 CAPSULE | Refills: 0 | Status: SHIPPED | OUTPATIENT
Start: 2025-08-09 | End: 2025-08-14

## 2025-08-09 RX ORDER — SODIUM CHLORIDE 0.9 % (FLUSH) 0.9 %
10 SYRINGE (ML) INJECTION AS NEEDED
Status: DISCONTINUED | OUTPATIENT
Start: 2025-08-09 | End: 2025-08-10 | Stop reason: HOSPADM

## 2025-08-09 RX ADMIN — SODIUM CHLORIDE 500 ML: 9 INJECTION, SOLUTION INTRAVENOUS at 22:44

## 2025-08-09 RX ADMIN — SODIUM CHLORIDE 500 MG: 9 INJECTION, SOLUTION INTRAVENOUS at 22:44

## 2025-08-09 RX ADMIN — AMPICILLIN SODIUM AND SULBACTAM SODIUM 3 G: 2; 1 INJECTION, POWDER, FOR SOLUTION INTRAMUSCULAR; INTRAVENOUS at 21:43

## 2025-08-10 VITALS
WEIGHT: 163.14 LBS | RESPIRATION RATE: 16 BRPM | HEART RATE: 52 BPM | SYSTOLIC BLOOD PRESSURE: 178 MMHG | TEMPERATURE: 97.6 F | DIASTOLIC BLOOD PRESSURE: 68 MMHG | OXYGEN SATURATION: 94 % | HEIGHT: 60 IN | BODY MASS INDEX: 32.03 KG/M2

## 2025-08-10 LAB
QT INTERVAL: 529 MS
QTC INTERVAL: 485 MS

## 2025-08-14 LAB
BACTERIA SPEC AEROBE CULT: NORMAL
BACTERIA SPEC AEROBE CULT: NORMAL

## 2025-08-19 ENCOUNTER — TELEPHONE (OUTPATIENT)
Dept: NEUROLOGY | Facility: CLINIC | Age: 80
End: 2025-08-19
Payer: MEDICARE

## 2025-08-22 ENCOUNTER — TELEPHONE (OUTPATIENT)
Age: 80
End: 2025-08-22
Payer: MEDICARE

## 2025-08-25 DIAGNOSIS — R10.10 UPPER ABDOMINAL PAIN: Primary | ICD-10-CM

## (undated) DEVICE — ENDO KIT,LOURDES HOSPITAL: Brand: MEDLINE INDUSTRIES, INC.